# Patient Record
Sex: FEMALE | Race: WHITE | Employment: OTHER | URBAN - METROPOLITAN AREA
[De-identification: names, ages, dates, MRNs, and addresses within clinical notes are randomized per-mention and may not be internally consistent; named-entity substitution may affect disease eponyms.]

---
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
## 2017-03-09 PROBLEM — R53.1 WEAKNESS: Status: ACTIVE | Noted: 2017-03-09

## 2017-03-09 PROBLEM — Z98.890 S/P MITRAL VALVE REPAIR: Status: ACTIVE | Noted: 2017-03-09

## 2017-05-04 ENCOUNTER — HOSPITAL ENCOUNTER (OUTPATIENT)
Dept: CARDIAC CATH/INVASIVE PROCEDURES | Age: 82
Discharge: HOME | End: 2017-05-04
Attending: INTERNAL MEDICINE | Admitting: INTERNAL MEDICINE
Payer: MEDICARE

## 2017-05-04 VITALS
DIASTOLIC BLOOD PRESSURE: 56 MMHG | SYSTOLIC BLOOD PRESSURE: 113 MMHG | HEIGHT: 68 IN | OXYGEN SATURATION: 94 % | WEIGHT: 156 LBS | HEART RATE: 80 BPM | BODY MASS INDEX: 23.64 KG/M2 | RESPIRATION RATE: 14 BRPM | TEMPERATURE: 98.4 F

## 2017-05-04 LAB
ANION GAP BLD CALC-SCNC: 7 MMOL/L (ref 7–16)
ATRIAL RATE: 40 BPM
BUN SERPL-MCNC: 13 MG/DL (ref 8–23)
CALCIUM SERPL-MCNC: 8.9 MG/DL (ref 8.3–10.4)
CALCULATED R AXIS, ECG10: -45 DEGREES
CALCULATED T AXIS, ECG11: -148 DEGREES
CHLORIDE SERPL-SCNC: 103 MMOL/L (ref 98–107)
CO2 SERPL-SCNC: 32 MMOL/L (ref 21–32)
CREAT SERPL-MCNC: 0.97 MG/DL (ref 0.6–1)
DIAGNOSIS, 93000: NORMAL
ERYTHROCYTE [DISTWIDTH] IN BLOOD BY AUTOMATED COUNT: 13.3 % (ref 11.9–14.6)
GLUCOSE SERPL-MCNC: 95 MG/DL (ref 65–100)
HCT VFR BLD AUTO: 44.2 % (ref 35.8–46.3)
HGB BLD-MCNC: 15 G/DL (ref 11.7–15.4)
INR PPP: 1.7 (ref 0.9–1.2)
MAGNESIUM SERPL-MCNC: 2.2 MG/DL (ref 1.8–2.4)
MCH RBC QN AUTO: 32.2 PG (ref 26.1–32.9)
MCHC RBC AUTO-ENTMCNC: 33.9 G/DL (ref 31.4–35)
MCV RBC AUTO: 94.8 FL (ref 79.6–97.8)
PLATELET # BLD AUTO: 230 K/UL (ref 150–450)
PMV BLD AUTO: 12.3 FL (ref 10.8–14.1)
POTASSIUM SERPL-SCNC: 3.6 MMOL/L (ref 3.5–5.1)
PROTHROMBIN TIME: 18.6 SEC (ref 9.6–12)
Q-T INTERVAL, ECG07: 352 MS
QRS DURATION, ECG06: 94 MS
QTC CALCULATION (BEZET), ECG08: 382 MS
RBC # BLD AUTO: 4.66 M/UL (ref 4.05–5.25)
SODIUM SERPL-SCNC: 142 MMOL/L (ref 136–145)
VENTRICULAR RATE, ECG03: 71 BPM
WBC # BLD AUTO: 9.1 K/UL (ref 4.3–11.1)

## 2017-05-04 PROCEDURE — 83735 ASSAY OF MAGNESIUM: CPT | Performed by: INTERNAL MEDICINE

## 2017-05-04 PROCEDURE — 33228 REMV&REPLC PM GEN DUAL LEAD: CPT

## 2017-05-04 PROCEDURE — 77030012935 HC DRSG AQUACEL BMS -B

## 2017-05-04 PROCEDURE — 85610 PROTHROMBIN TIME: CPT | Performed by: INTERNAL MEDICINE

## 2017-05-04 PROCEDURE — 74011250636 HC RX REV CODE- 250/636: Performed by: INTERNAL MEDICINE

## 2017-05-04 PROCEDURE — 99152 MOD SED SAME PHYS/QHP 5/>YRS: CPT

## 2017-05-04 PROCEDURE — 77030031139 HC SUT VCRL2 J&J -A

## 2017-05-04 PROCEDURE — 80048 BASIC METABOLIC PNL TOTAL CA: CPT | Performed by: INTERNAL MEDICINE

## 2017-05-04 PROCEDURE — 74011000250 HC RX REV CODE- 250: Performed by: INTERNAL MEDICINE

## 2017-05-04 PROCEDURE — 77030010507 HC ADH SKN DERMBND J&J -B

## 2017-05-04 PROCEDURE — 74011250636 HC RX REV CODE- 250/636

## 2017-05-04 PROCEDURE — 99153 MOD SED SAME PHYS/QHP EA: CPT

## 2017-05-04 PROCEDURE — 85027 COMPLETE CBC AUTOMATED: CPT | Performed by: INTERNAL MEDICINE

## 2017-05-04 PROCEDURE — 93005 ELECTROCARDIOGRAM TRACING: CPT | Performed by: INTERNAL MEDICINE

## 2017-05-04 PROCEDURE — 33222 RELOCATION POCKET PACEMAKER: CPT

## 2017-05-04 PROCEDURE — C1785 PMKR, DUAL, RATE-RESP: HCPCS

## 2017-05-04 RX ORDER — MIDAZOLAM HYDROCHLORIDE 1 MG/ML
1-6 INJECTION, SOLUTION INTRAMUSCULAR; INTRAVENOUS
Status: DISCONTINUED | OUTPATIENT
Start: 2017-05-04 | End: 2017-05-04

## 2017-05-04 RX ORDER — SODIUM CHLORIDE 0.9 % (FLUSH) 0.9 %
5-10 SYRINGE (ML) INJECTION EVERY 8 HOURS
Status: CANCELLED | OUTPATIENT
Start: 2017-05-04

## 2017-05-04 RX ORDER — CEFAZOLIN SODIUM IN 0.9 % NACL 2 G/50 ML
2 INTRAVENOUS SOLUTION, PIGGYBACK (ML) INTRAVENOUS ONCE
Status: COMPLETED | OUTPATIENT
Start: 2017-05-04 | End: 2017-05-04

## 2017-05-04 RX ORDER — FENTANYL CITRATE 50 UG/ML
25-100 INJECTION, SOLUTION INTRAMUSCULAR; INTRAVENOUS
Status: DISCONTINUED | OUTPATIENT
Start: 2017-05-04 | End: 2017-05-04

## 2017-05-04 RX ORDER — SODIUM CHLORIDE 0.9 % (FLUSH) 0.9 %
5-10 SYRINGE (ML) INJECTION AS NEEDED
Status: CANCELLED | OUTPATIENT
Start: 2017-05-04

## 2017-05-04 RX ORDER — SODIUM CHLORIDE 9 MG/ML
75 INJECTION, SOLUTION INTRAVENOUS CONTINUOUS
Status: DISCONTINUED | OUTPATIENT
Start: 2017-05-04 | End: 2017-05-05 | Stop reason: HOSPADM

## 2017-05-04 RX ORDER — LIDOCAINE HYDROCHLORIDE 10 MG/ML
10-200 INJECTION INFILTRATION; PERINEURAL
Status: DISCONTINUED | OUTPATIENT
Start: 2017-05-04 | End: 2017-05-04

## 2017-05-04 RX ADMIN — FENTANYL CITRATE 50 MCG: 50 INJECTION, SOLUTION INTRAMUSCULAR; INTRAVENOUS at 16:08

## 2017-05-04 RX ADMIN — CEFAZOLIN 2 G: 1 INJECTION, POWDER, FOR SOLUTION INTRAMUSCULAR; INTRAVENOUS; PARENTERAL at 15:45

## 2017-05-04 RX ADMIN — SODIUM CHLORIDE 75 ML/HR: 900 INJECTION, SOLUTION INTRAVENOUS at 13:38

## 2017-05-04 RX ADMIN — MIDAZOLAM HYDROCHLORIDE 2 MG: 1 INJECTION, SOLUTION INTRAMUSCULAR; INTRAVENOUS at 16:08

## 2017-05-04 RX ADMIN — SODIUM CHLORIDE 100000 UNITS: 900 INJECTION, SOLUTION INTRAVENOUS at 16:11

## 2017-05-04 RX ADMIN — LIDOCAINE HYDROCHLORIDE 20 ML: 10 INJECTION, SOLUTION INFILTRATION; PERINEURAL at 16:27

## 2017-05-04 NOTE — PROCEDURES
Brief Cardiac Procedure Note    Patient: Martir Seay MRN: [id number]  SSN: [id number]    YOB: 1935  Age: 80 y.o. Sex: female      Date of Procedure: 5/4/2017     Pre-procedure Diagnosis: pacer at [facility]    Post-procedure Diagnosis: Same    Procedure: generator changeout and pocket revision    Brief Description of Procedure- no complications    Performed By: Earl Steven MD     Assistants:none    Anesthesia: Moderate Sedation    Estimated Blood Loss: Less than 10 mL      Specimens: None    Implants: None    Findings St. Zheng MRI safe dual chamber without complication, pocket enlarged to accomodate enlarged header. Complications: None    Recommendations: Continue medical therapy.     Signed By: Earl Steven MD     May 4, 2017

## 2017-05-04 NOTE — PROGRESS NOTES
Patient received to [address] [place] [address] room # 3  Ambulatory from [facility]. Patient scheduled for PPM today with Dr Macie Garrett. Procedure reviewed & questions answered, voiced good understanding consent obtained & placed on chart. All medications and medical history reviewed. Will prep patient per orders. Patient & family updated on plan of care.

## 2017-05-04 NOTE — DISCHARGE INSTRUCTIONS
PACEMAKER INSTRUCTIONS SHEET  · Keep your incision dry for 10 days. DO NOT put salves, ointments, and/or lotions on the incision. Only take a tub bath during this time; NO showers. · The pieces of tape on the incision will come off by themselves when you begin washing the site. Please do not pull or tear them off. · You may use your pacemaker arm; but DO NOT raise the arm higher than your shoulder for the first two weeks to prevent the pacemaker lead from moving. DO NOT immobilize your pacemaker arm. · Call us IMMEDIATELY if you develop fever, pain, redness, and/or drainage at the pacemaker arm. · Do not lift more than 10 pounds for 2 weeks and 20 pounds for 1 month. · No driving for 1 week. · Resume Coumadin tomorrow night as prescribed. · The office will be calling you to schedule an appointment for follow up. If you have not heard from them by Monday please call [facility] Cardiology at [phone] to schedule your appointment for 10-14 days from today.

## 2017-05-04 NOTE — PROGRESS NOTES
Patient pre-assessment complete for Gen change with Dr Shannon Loo scheduled for 17 at 2pm, arrival time 12n. Patient verified using . Patient instructed to bring all home medications in labeled bottles on the day of procedure. NPO status reinforced. Patient instructed to HOLD coumadin (last dose Mon 17) & hold lasix & potassium in am. Instructed they can take all other medications excluding vitamins & supplements. Patient verbalizes understanding of all instructions & denies any questions at this time.

## 2017-05-04 NOTE — ROUTINE PROCESS
TRANSFER - OUT REPORT:    Verbal report given to LELO David (name) on [employer]   being transferred to [facility] (unit) for routine progression of care       Report consisted of patients Situation, Background, Assessment and   Recommendations(SBAR). Information from the following report(s) Procedure Summary, MAR and Recent Results was reviewed with the receiving nurse. Lines:   Peripheral IV 05/04/17 Left Antecubital (Active)   Site Assessment Clean, dry, & intact; Clean;Dry 5/4/2017  1:39 PM   Phlebitis Assessment 0 5/4/2017  1:39 PM   Infiltration Assessment 0 5/4/2017  1:39 PM   Dressing Status Clean, dry, & intact; Clean;Dry 5/4/2017  1:39 PM   Dressing Type Tape;Transparent 5/4/2017  1:39 PM   Hub Color/Line Status Patent; Infusing;Pink 5/4/2017  1:39 PM        Opportunity for questions and clarification was provided.       Patient transported with:   Tech     Generator change w/ Dr Roy Round  Incision to left chest w/ aquacel dressing and pressure dressing on top  DDDR   Versed 2 mg IV  Fentanyl 50 mcg IV

## 2017-05-04 NOTE — PROGRESS NOTES
Patient received to [address] [place] [address] room # 3  Ambulatory from [facility]. Patient scheduled for Gen change today with Dr Jayashree Ryan. Procedure reviewed & questions answered, voiced good understanding consent obtained & placed on chart. All medications and medical history reviewed. Will prep patient per orders. Patient & family updated on plan of care.

## 2017-05-04 NOTE — H&P
[address] [place], [place]  [address], [address]  PHONE: [phone]    [facility]  1935  PCP:  Lilliana Morton MD    SUBJECTIVE:   Isabela Chase is a 80 y.o. female seen for a cath lab visit regarding the following:     Pacer at  [facility]     HPI:  She presents for pacer generator changeout, at [employer]. Doing well since last visit with Dr. Zia Dorman without interval angina, CHF, palpitations, edema, presyncope or syncope. Vitals controlled and tolerating meds well. Staying active for her age without any significant limitations. Past Medical History, Past Surgical History, Family history, Social History, and Medications were all reviewed with the patient today and updated as necessary. Outpatient Prescriptions Marked as Taking for the 5/4/17 encounter [facility] Encounter) with [facility] CATH 2 ALL EVENTS   Medication Sig Dispense Refill    potassium chloride SR (KLOR-CON 10) 10 mEq tablet TAKE 1 TABLET EVERY DAY 90 Tab 3    meclizine (ANTIVERT) 25 mg tablet TAKE 1 TABLET THREE TIMES DAILY (Patient taking differently: TAKE 1 TABLET THREE TIMES DAILY as needec) 270 Tab 0    digoxin (LANOXIN) 0.125 mg tablet Take 1 Tab by mouth daily. 90 Tab 3    diltiazem hcl 240 mg Tb24 Take 240 mg by mouth daily. 90 Tab 3    furosemide (LASIX) 20 mg tablet Take 1 Tab by mouth daily. 90 Tab 3    levothyroxine (SYNTHROID) 100 mcg tablet Take  by mouth Daily (before breakfast).  warfarin (COUMADIN) 5 mg tablet Take 5 mg by mouth daily.          Allergies   Allergen Reactions    Phenergan [Promethazine] Unknown (comments)    Sulfa (Sulfonamide Antibiotics) Unknown (comments)       Patient Active Problem List    Diagnosis    Weakness    S/P mitral valve repair    Dyspnea on exertion    Sick sinus syndrome (HCC)    Atrial fibrillation (HCC)    Mitral valve insufficiency    Presence of cardiac pacemaker    Benign paroxysmal vertigo of both ears       Past Surgical History: Procedure Laterality Date    CARDIAC SURG PROCEDURE UNLIST      mitral valve surgery at [address]    HX APPENDECTOMY      HX HYSTERECTOMY      HX MITRAL VALVE REPLACEMENT      --REPAIR    HX OTHER SURGICAL      bladder tack    HX PACEMAKER      HX THYROIDECTOMY         History reviewed. No pertinent family history. Social History   Substance Use Topics    Smoking status: Never Smoker    Smokeless tobacco: Never Used    Alcohol use No       ROS:    Review of Systems   Constitution: Negative for fever, malaise/fatigue and weight loss. Cardiovascular: Negative for chest pain, dyspnea on exertion, orthopnea, palpitations and paroxysmal nocturnal dyspnea. Respiratory: Negative for cough, hemoptysis, shortness of breath and wheezing. Gastrointestinal: Negative for hematemesis, hematochezia and melena. PHYSICAL EXAM:     Visit Vitals    /76    Pulse 80    Temp 98.4 °F (36.9 °C)    Resp 14    Ht 5' 8\" (1.727 m)    Wt 70.8 kg (156 lb)    SpO2 94%    Breastfeeding No    BMI 23.72 kg/m2        Physical Exam   Constitutional: She is oriented to person, place, and time. She appears well-developed and well-nourished. Neck: Neck supple. No JVD present. Carotid bruit is not present. No thyromegaly present. Cardiovascular: Normal rate and regular rhythm. Exam reveals no gallop and no friction rub. No murmur heard. Pulmonary/Chest: Breath sounds normal. She has no wheezes. She has no rales. Abdominal: Soft. She exhibits no distension. There is no tenderness. Musculoskeletal: She exhibits no edema. Neurological: She is alert and oriented to person, place, and time. Skin: Skin is warm and dry. Psychiatric: She has a normal mood and affect. Medical problems and test results were reviewed with the patient today.      Recent Results (from the past 672 hour(s))   EKG, 12 LEAD, INITIAL    Collection Time: 05/04/17  1:25 PM   Result Value Ref Range    Ventricular Rate 71 BPM Atrial Rate 40 BPM    QRS Duration 94 ms    Q-T Interval 352 ms    QTC Calculation (Bezet) 382 ms    Calculated R Axis -45 degrees    Calculated T Axis -148 degrees    Diagnosis       Demand pacemaker; interpretation is based on intrinsic rhythm  Accelerated Junctional rhythm with Fusion complexes  Left axis deviation  Incomplete right bundle branch block  ST & T wave abnormality, consider inferior ischemia  ST & T wave abnormality, consider anterolateral ischemia  Abnormal ECG  No previous ECGs available  Confirmed by Fabio Pelletier ([id number]) on 5/4/2017 2:32:42 PM     CBC W/O DIFF    Collection Time: 05/04/17  1:26 PM   Result Value Ref Range    WBC 9.1 4.3 - 11.1 K/uL    RBC 4.66 4.05 - 5.25 M/uL    HGB 15.0 11.7 - 15.4 g/dL    HCT 44.2 35.8 - 46.3 %    MCV 94.8 79.6 - 97.8 FL    MCH 32.2 26.1 - 32.9 PG    MCHC 33.9 31.4 - 35.0 g/dL    RDW 13.3 11.9 - 14.6 %    PLATELET 417 530 - 113 K/uL    MPV 12.3 10.8 - 49.2 FL   METABOLIC PANEL, BASIC    Collection Time: 05/04/17  1:26 PM   Result Value Ref Range    Sodium 142 136 - 145 mmol/L    Potassium 3.6 3.5 - 5.1 mmol/L    Chloride 103 98 - 107 mmol/L    CO2 32 21 - 32 mmol/L    Anion gap 7 7 - 16 mmol/L    Glucose 95 65 - 100 mg/dL    BUN 13 8 - 23 MG/DL    Creatinine 0.97 0.6 - 1.0 MG/DL    GFR est AA >60 >60 ml/min/1.73m2    GFR est non-AA 59 (L) >60 ml/min/1.73m2    Calcium 8.9 8.3 - 10.4 MG/DL   PROTHROMBIN TIME + INR    Collection Time: 05/04/17  1:26 PM   Result Value Ref Range    Prothrombin time 18.6 (H) 9.6 - 12.0 sec    INR 1.7 (H) 0.9 - 1.2     MAGNESIUM    Collection Time: 05/04/17  1:26 PM   Result Value Ref Range    Magnesium 2.2 1.8 - 2.4 mg/dL         Results for orders placed or performed during the hospital encounter of 05/04/17   CBC W/O DIFF   Result Value Ref Range    WBC 9.1 4.3 - 11.1 K/uL    RBC 4.66 4.05 - 5.25 M/uL    HGB 15.0 11.7 - 15.4 g/dL    HCT 44.2 35.8 - 46.3 %    MCV 94.8 79.6 - 97.8 FL    MCH 32.2 26.1 - 32.9 PG    MCHC 33.9 31.4 - 35.0 g/dL    RDW 13.3 11.9 - 14.6 %    PLATELET 915 052 - 971 K/uL    MPV 12.3 10.8 - 07.5 FL   METABOLIC PANEL, BASIC   Result Value Ref Range    Sodium 142 136 - 145 mmol/L    Potassium 3.6 3.5 - 5.1 mmol/L    Chloride 103 98 - 107 mmol/L    CO2 32 21 - 32 mmol/L    Anion gap 7 7 - 16 mmol/L    Glucose 95 65 - 100 mg/dL    BUN 13 8 - 23 MG/DL    Creatinine 0.97 0.6 - 1.0 MG/DL    GFR est AA >60 >60 ml/min/1.73m2    GFR est non-AA 59 (L) >60 ml/min/1.73m2    Calcium 8.9 8.3 - 10.4 MG/DL   PROTHROMBIN TIME + INR   Result Value Ref Range    Prothrombin time 18.6 (H) 9.6 - 12.0 sec    INR 1.7 (H) 0.9 - 1.2     MAGNESIUM   Result Value Ref Range    Magnesium 2.2 1.8 - 2.4 mg/dL   EKG, 12 LEAD, INITIAL   Result Value Ref Range    Ventricular Rate 71 BPM    Atrial Rate 40 BPM    QRS Duration 94 ms    Q-T Interval 352 ms    QTC Calculation (Bezet) 382 ms    Calculated R Axis -45 degrees    Calculated T Axis -148 degrees    Diagnosis       Demand pacemaker; interpretation is based on intrinsic rhythm  Accelerated Junctional rhythm with Fusion complexes  Left axis deviation  Incomplete right bundle branch block  ST & T wave abnormality, consider inferior ischemia  ST & T wave abnormality, consider anterolateral ischemia  Abnormal ECG  No previous ECGs available  Confirmed by Eugene Madera ([id number]) on 5/4/2017 2:32:42 PM          ASSESSMENT and PLAN    Pacer [facility]- gen change today, resume coumadin tomorrow as taking regularly    Follow up with Dr. Carola Seay and with pacer clinic in 8-14 days.                 Milan Tamayo MD  05/04/17  6:00 PM

## 2017-05-04 NOTE — IP AVS SNAPSHOT
[address] 
[phone] Patient: Deepak Patricia 
MRN: [id number] Discharge Summary 5/4/2017 Deepak Patricia  
 MRN[de-identified]  [id number] Admission Information Provider Pager Service Admission Date Expected D/C Date Lani Neff MD  CARDIAC CATH LAB 5/4/2017 Actual LOS Patient Class 0 days OUTPATIENT Follow-up Information None Current Discharge Medication List  
  
ASK your doctor about these medications Dose & Instructions Dispensing Information Comments Morning Noon Evening Bedtime  
 digoxin 0.125 mg tablet Commonly known as:  LANOXIN Your last dose was: Your next dose is:    
   
   
 Dose:  0.125 mg Take 1 Tab by mouth daily. Quantity:  90 Tab Refills:  3  
     
   
   
   
  
 dilTIAZem  mg Tb24 tablet Commonly known as:  CARDIZEM LA Your last dose was: Your next dose is:    
   
   
 Dose:  240 mg Take 240 mg by mouth daily. Quantity:  90 Tab Refills:  3  
     
   
   
   
  
 furosemide 20 mg tablet Commonly known as:  LASIX Your last dose was: Your next dose is:    
   
   
 Dose:  20 mg Take 1 Tab by mouth daily. Quantity:  90 Tab Refills:  3  
     
   
   
   
  
 levothyroxine 100 mcg tablet Commonly known as:  SYNTHROID Your last dose was: Your next dose is: Take  by mouth Daily (before breakfast). Refills:  0  
     
   
   
   
  
 meclizine 25 mg tablet Commonly known as:  ANTIVERT Your last dose was: Your next dose is: TAKE 1 TABLET THREE TIMES DAILY Quantity:  270 Tab Refills:  0 Pt needs to get from PCP. Thanks! potassium chloride SR 10 mEq tablet Commonly known as:  KLOR-CON 10 Your last dose was: Your next dose is: TAKE 1 TABLET EVERY DAY Quantity:  90 Tab Refills:  3  
     
   
   
   
  
 warfarin 5 mg tablet Commonly known as:  COUMADIN Your last dose was: Your next dose is:    
   
   
 Dose:  5 mg Take 5 mg by mouth daily. Refills:  0 General Information Please provide this summary of care documentation to your next provider. Allergies Unspecified:  Phenergan [Promethazine];  Sulfa (Sulfonamide Antibiotics) Current Immunizations  Never Reviewed No immunizations on file. Discharge Instructions Discharge Instructions PACEMAKER INSTRUCTIONS SHEET 
· Keep your incision dry for 10 days. DO NOT put salves, ointments, and/or lotions on the incision. Only take a tub bath during this time; NO showers. · The pieces of tape on the incision will come off by themselves when you begin washing the site. Please do not pull or tear them off. · You may use your pacemaker arm; but DO NOT raise the arm higher than your shoulder for the first two weeks to prevent the pacemaker lead from moving. DO NOT immobilize your pacemaker arm. · Call us IMMEDIATELY if you develop fever, pain, redness, and/or drainage at the pacemaker arm. · Do not lift more than 10 pounds for 2 weeks and 20 pounds for 1 month. · No driving for 1 week. · Resume Coumadin tomorrow night as prescribed. · The office will be calling you to schedule an appointment for follow up. If you have not heard from them by Monday please call [facility] Cardiology at [phone] to schedule your appointment for 10-14 days from today. Discharge Orders None  
  
` Patient Signature:  ____________________________________________________________ Date:  ____________________________________________________________  
  
 Pieter Damian Provider Signature:  ____________________________________________________________ Date:  ____________________________________________________________

## 2017-05-04 NOTE — PROGRESS NOTES
Discharge and follow up reviewed with pt and family at this time. Message left at [facility] to call pt to schedule follow up with pacer clinic and Dr Amelia Dukes.   Pt discharged to door via wheelchair

## 2017-05-05 NOTE — PROCEDURES
Nick Lui 46       Name:  Esther Haider   MR#:  [id number]   :  1935   Account #:  [de-identified]   Date of Adm:  2017       DATE OF PROCEDURE: 2017    REFERRING PHYSICIAN: Mohinder Ramirez MD.    REASON FOR PROCEDURE: Pacemaker at [facility]. PROCEDURE PERFORMED: Pacemaker generator change out and pocket   enlargement/revision. ESTIMATED BLOOD LOSS: Less than 10 mL. CONSCIOUS SEDATION: The patient was sedated with 2 mg of Versed   and 50 mcg fentanyl and monitored for about 30 minutes. PROCEDURE TECHNIQUE: After informed consent was obtained, the   patient was brought to the cath lab and prepped and draped in   the usual fashion. The skin and subcutaneous tissue overlying   the previous pacer implant was anesthetized with lidocaine. We   anesthetized superior to the prior scar, given the patient's   thin body habitus and lack of extensive tissue over the prior   scar. Bovie cauterization was carried out to the pacemaker   capsule which was incised using a scalpel and the pacemaker and   wires were freed from adhesions and the generator was   successfully explanted. The leads were disconnected and   reattached to the new [employer] Assurity MRI-safe device. Given   the enlarged header, the device did not fit well into the pocket,   which was small and high in the chest, given her thin body   habitus. For this reason, we anesthetized the base of the pocket   and used Bovie cauterization and blunt dissection to enlarge the   base of the pocket to accommodate for the enlarged header. The   device laid in the base of the new pocket with much less tension   on the incision. After hemostasis was confirmed and the pocket   was flushed using antibiotic saline, the device was placed in   the pocket a final time. The pacemaker capsule was closed using   interrupted sutures of 2-0 Vicryl.  A running mid layer of 3-0   Vicryl was thrown in the subcutaneous tissue and the skin was   approximated using a horizontal mattress suture of 4-0 Vicryl. Dermabond and a pressure dressing were applied. The chronic   atrial and ventricular leads were retested and had stable and   unchanged threshold, impedance, T-waves and R-waves respectively. The new device is a [employer]heng, Assurity MRI-safe serial [id number]. CONCLUSIONS: Successful dual-chamber pacemaker generator change   out and pocket enlargement/revision. Given the patient's thin   body habitus and tight pocket, the pocket was enlarged, but I   did not feel it was safe to cap the atrial lead despite the   patient's permanent atrial fibrillation. I had increased fears   for erosion and for this reason, a standard dual-chamber device   was implanted.         MD ABDIRAHMAN Jensen / Nii Jamison   D:  05/04/2017   16:57   T:  05/04/2017   17:19   Job #:  [id number]     [facility] Cardiology

## 2018-03-22 PROBLEM — R07.89 OTHER CHEST PAIN: Status: ACTIVE | Noted: 2018-03-22

## 2018-04-09 PROBLEM — R94.39 ABNORMAL CARDIOVASCULAR STRESS TEST: Status: ACTIVE | Noted: 2018-04-09

## 2018-04-13 RX ORDER — ASPIRIN 81 MG/1
81 TABLET ORAL DAILY
COMMUNITY
End: 2018-04-23

## 2018-04-13 NOTE — PROGRESS NOTES
Patient pre-assessment complete for [facility] poss with DR Ervin Estrada scheduled for 18 at 9:30am, arrival time 7:30am. Patient verified using . Patient instructed to bring all home medications in labeled bottles on the day of procedure. NPO status reinforced. Patient informed to take a full dose aspirin 325mg  or 81 mg x 4 on the day of procedure. Patient instructed to HOLD Coumadin x 4 days (last dose 18) & HOLD lasix on am of procedure. Instructed they can take all other medications excluding vitamins & supplements. Patient verbalizes understanding of all instructions & denies any questions at this time.

## 2018-04-16 ENCOUNTER — HOSPITAL ENCOUNTER (OUTPATIENT)
Dept: CARDIAC CATH/INVASIVE PROCEDURES | Age: 83
Discharge: HOME | End: 2018-04-16
Attending: INTERNAL MEDICINE | Admitting: INTERNAL MEDICINE
Payer: MEDICARE

## 2018-04-16 VITALS
RESPIRATION RATE: 16 BRPM | OXYGEN SATURATION: 94 % | WEIGHT: 170 LBS | HEIGHT: 69 IN | BODY MASS INDEX: 25.18 KG/M2 | HEART RATE: 70 BPM | SYSTOLIC BLOOD PRESSURE: 107 MMHG | TEMPERATURE: 98 F | DIASTOLIC BLOOD PRESSURE: 58 MMHG

## 2018-04-16 LAB
ANION GAP SERPL CALC-SCNC: 6 MMOL/L (ref 7–16)
ATRIAL RATE: 70 BPM
BUN SERPL-MCNC: 14 MG/DL (ref 8–23)
CALCIUM SERPL-MCNC: 8.9 MG/DL (ref 8.3–10.4)
CALCULATED R AXIS, ECG10: -81 DEGREES
CALCULATED T AXIS, ECG11: 96 DEGREES
CHLORIDE SERPL-SCNC: 105 MMOL/L (ref 98–107)
CO2 SERPL-SCNC: 30 MMOL/L (ref 21–32)
CREAT SERPL-MCNC: 0.94 MG/DL (ref 0.6–1)
DIAGNOSIS, 93000: NORMAL
ERYTHROCYTE [DISTWIDTH] IN BLOOD BY AUTOMATED COUNT: 12.6 % (ref 11.9–14.6)
GLUCOSE SERPL-MCNC: 96 MG/DL (ref 65–100)
HCT VFR BLD AUTO: 42.7 % (ref 35.8–46.3)
HGB BLD-MCNC: 14.1 G/DL (ref 11.7–15.4)
INR PPP: 1.2
MCH RBC QN AUTO: 31.8 PG (ref 26.1–32.9)
MCHC RBC AUTO-ENTMCNC: 33 G/DL (ref 31.4–35)
MCV RBC AUTO: 96.2 FL (ref 79.6–97.8)
PLATELET # BLD AUTO: 186 K/UL (ref 150–450)
PMV BLD AUTO: 12.3 FL (ref 10.8–14.1)
POTASSIUM SERPL-SCNC: 4.2 MMOL/L (ref 3.5–5.1)
PROTHROMBIN TIME: 14.9 SEC (ref 11.5–14.5)
Q-T INTERVAL, ECG07: 432 MS
QRS DURATION, ECG06: 146 MS
QTC CALCULATION (BEZET), ECG08: 488 MS
RBC # BLD AUTO: 4.44 M/UL (ref 4.05–5.25)
SODIUM SERPL-SCNC: 141 MMOL/L (ref 136–145)
VENTRICULAR RATE, ECG03: 77 BPM
WBC # BLD AUTO: 7.5 K/UL (ref 4.3–11.1)

## 2018-04-16 PROCEDURE — C1894 INTRO/SHEATH, NON-LASER: HCPCS

## 2018-04-16 PROCEDURE — 74011636320 HC RX REV CODE- 636/320: Performed by: INTERNAL MEDICINE

## 2018-04-16 PROCEDURE — 74011250636 HC RX REV CODE- 250/636: Performed by: INTERNAL MEDICINE

## 2018-04-16 PROCEDURE — 85610 PROTHROMBIN TIME: CPT | Performed by: INTERNAL MEDICINE

## 2018-04-16 PROCEDURE — 80048 BASIC METABOLIC PNL TOTAL CA: CPT | Performed by: INTERNAL MEDICINE

## 2018-04-16 PROCEDURE — 74011000250 HC RX REV CODE- 250: Performed by: INTERNAL MEDICINE

## 2018-04-16 PROCEDURE — 99152 MOD SED SAME PHYS/QHP 5/>YRS: CPT

## 2018-04-16 PROCEDURE — 77030019569 HC BND COMPR RAD TERU -B

## 2018-04-16 PROCEDURE — 93458 L HRT ARTERY/VENTRICLE ANGIO: CPT

## 2018-04-16 PROCEDURE — C1769 GUIDE WIRE: HCPCS

## 2018-04-16 PROCEDURE — 74011250636 HC RX REV CODE- 250/636

## 2018-04-16 PROCEDURE — 85027 COMPLETE CBC AUTOMATED: CPT | Performed by: INTERNAL MEDICINE

## 2018-04-16 PROCEDURE — 77030004534 HC CATH ANGI DX INFN CARD -A

## 2018-04-16 PROCEDURE — 93005 ELECTROCARDIOGRAM TRACING: CPT | Performed by: INTERNAL MEDICINE

## 2018-04-16 RX ORDER — GUAIFENESIN 100 MG/5ML
81-324 LIQUID (ML) ORAL
Status: DISCONTINUED | OUTPATIENT
Start: 2018-04-16 | End: 2018-04-16 | Stop reason: HOSPADM

## 2018-04-16 RX ORDER — HEPARIN SODIUM 200 [USP'U]/100ML
3 INJECTION, SOLUTION INTRAVENOUS CONTINUOUS
Status: DISCONTINUED | OUTPATIENT
Start: 2018-04-16 | End: 2018-04-16 | Stop reason: HOSPADM

## 2018-04-16 RX ORDER — SODIUM CHLORIDE 9 MG/ML
75 INJECTION, SOLUTION INTRAVENOUS CONTINUOUS
Status: DISCONTINUED | OUTPATIENT
Start: 2018-04-16 | End: 2018-04-16 | Stop reason: HOSPADM

## 2018-04-16 RX ORDER — ONDANSETRON 2 MG/ML
4 INJECTION INTRAMUSCULAR; INTRAVENOUS
Status: DISCONTINUED | OUTPATIENT
Start: 2018-04-16 | End: 2018-04-16 | Stop reason: HOSPADM

## 2018-04-16 RX ORDER — MIDAZOLAM HYDROCHLORIDE 1 MG/ML
.5-5 INJECTION, SOLUTION INTRAMUSCULAR; INTRAVENOUS
Status: DISCONTINUED | OUTPATIENT
Start: 2018-04-16 | End: 2018-04-16 | Stop reason: HOSPADM

## 2018-04-16 RX ORDER — HYDROCODONE BITARTRATE AND ACETAMINOPHEN 5; 325 MG/1; MG/1
1 TABLET ORAL
Status: DISCONTINUED | OUTPATIENT
Start: 2018-04-16 | End: 2018-04-16 | Stop reason: HOSPADM

## 2018-04-16 RX ORDER — MORPHINE SULFATE 2 MG/ML
1 INJECTION, SOLUTION INTRAMUSCULAR; INTRAVENOUS
Status: DISCONTINUED | OUTPATIENT
Start: 2018-04-16 | End: 2018-04-16 | Stop reason: HOSPADM

## 2018-04-16 RX ORDER — LIDOCAINE HYDROCHLORIDE 20 MG/ML
1-40 INJECTION, SOLUTION INFILTRATION; PERINEURAL
Status: DISCONTINUED | OUTPATIENT
Start: 2018-04-16 | End: 2018-04-16 | Stop reason: HOSPADM

## 2018-04-16 RX ORDER — FENTANYL CITRATE 50 UG/ML
25-200 INJECTION, SOLUTION INTRAMUSCULAR; INTRAVENOUS
Status: DISCONTINUED | OUTPATIENT
Start: 2018-04-16 | End: 2018-04-16 | Stop reason: HOSPADM

## 2018-04-16 RX ORDER — SODIUM CHLORIDE 0.9 % (FLUSH) 0.9 %
5-10 SYRINGE (ML) INJECTION AS NEEDED
Status: DISCONTINUED | OUTPATIENT
Start: 2018-04-16 | End: 2018-04-16 | Stop reason: HOSPADM

## 2018-04-16 RX ORDER — ACETAMINOPHEN 325 MG/1
650 TABLET ORAL
Status: DISCONTINUED | OUTPATIENT
Start: 2018-04-16 | End: 2018-04-16 | Stop reason: HOSPADM

## 2018-04-16 RX ORDER — NALOXONE HYDROCHLORIDE 0.4 MG/ML
0.4 INJECTION, SOLUTION INTRAMUSCULAR; INTRAVENOUS; SUBCUTANEOUS AS NEEDED
Status: DISCONTINUED | OUTPATIENT
Start: 2018-04-16 | End: 2018-04-16 | Stop reason: HOSPADM

## 2018-04-16 RX ORDER — SODIUM CHLORIDE 0.9 % (FLUSH) 0.9 %
5-10 SYRINGE (ML) INJECTION EVERY 8 HOURS
Status: DISCONTINUED | OUTPATIENT
Start: 2018-04-16 | End: 2018-04-16 | Stop reason: HOSPADM

## 2018-04-16 RX ADMIN — VERAPAMIL HYDROCHLORIDE 2 ML: 2.5 INJECTION INTRAVENOUS at 11:19

## 2018-04-16 RX ADMIN — MIDAZOLAM HYDROCHLORIDE 1 MG: 1 INJECTION, SOLUTION INTRAMUSCULAR; INTRAVENOUS at 11:36

## 2018-04-16 RX ADMIN — HEPARIN SODIUM 3 ML/HR: 5000 INJECTION, SOLUTION INTRAVENOUS; SUBCUTANEOUS at 11:20

## 2018-04-16 RX ADMIN — FENTANYL CITRATE 25 MCG: 50 INJECTION, SOLUTION INTRAMUSCULAR; INTRAVENOUS at 11:36

## 2018-04-16 RX ADMIN — LIDOCAINE HYDROCHLORIDE 100 MG: 20 INJECTION, SOLUTION INFILTRATION; PERINEURAL at 11:18

## 2018-04-16 RX ADMIN — SODIUM CHLORIDE 75 ML/HR: 900 INJECTION, SOLUTION INTRAVENOUS at 08:52

## 2018-04-16 RX ADMIN — IOPAMIDOL 130 ML: 755 INJECTION, SOLUTION INTRAVENOUS at 11:50

## 2018-04-16 NOTE — IP AVS SNAPSHOT
[address] 
[phone] Patient: Kumar Client 
MRN: [id number]:[id number] Discharge Summary 4/16/2018 Kumar Client  
 MRN[de-identified]  [id number] Admission Information Provider Pager Service Admission Date Expected D/C Date Sj Alva MD  CARDIAC CATH LAB 4/16/2018 Actual LOS Patient Class 0 days OUTPATIENT Follow-up Information Follow up With Details Comments Contact Info Regine Pettit MD On 5/2/2018 at 3:15pm in the [place] office, for heart cath follow-up [address] [facility], [place], operated [facility] [address] 
[phone] My Medications ASK your physician about these medications Instructions Each Dose to Equal  
 Morning Noon Evening Bedtime  
 aspirin delayed-release 81 mg tablet Your last dose was: Your next dose is: Take 81 mg by mouth daily. 81 mg  
    
   
   
   
  
 digoxin 0.125 mg tablet Commonly known as:  LANOXIN Your last dose was: Your next dose is: Take 1 Tab by mouth daily. 0.125 mg  
    
   
   
   
  
 dilTIAZem  mg Tb24 tablet Commonly known as:  CARDIZEM LA Your last dose was: Your next dose is: Take 1 Tab by mouth daily. 240 mg  
    
   
   
   
  
 furosemide 20 mg tablet Commonly known as:  LASIX Your last dose was: Your next dose is: Take 1 Tab by mouth daily. 20 mg  
    
   
   
   
  
 levothyroxine 100 mcg tablet Commonly known as:  SYNTHROID Your last dose was: Your next dose is: Take  by mouth Daily (before breakfast). potassium chloride SR 10 mEq tablet Commonly known as:  KLOR-CON 10 Your last dose was: Your next dose is: TAKE 1 TABLET EVERY DAY  
     
   
   
   
  
 warfarin 5 mg tablet Commonly known as:  COUMADIN Your last dose was: Your next dose is: Take 5 mg by mouth daily. 5 mg General Information Please provide this summary of care documentation to your next provider. Allergies Unspecified:  Phenergan [Promethazine];  Sulfa (Sulfonamide Antibiotics) Current Immunizations  Never Reviewed No immunizations on file. Discharge Instructions Discharge Instructions HEART CATHETERIZATION/ANGIOGRAPHY DISCHARGE INSTRUCTIONS 1. Check puncture site frequently for swelling or bleeding. If there is any bleeding, apply pressure over the area with a clean towel or washcloth. If you are unable to stop the bleeding in 15-20 minutes call 911. Notify your doctor for any redness, swelling, drainage, or oozing from the puncture site. Notify your doctor for any fever, chills or other signs of infection. 2. If the extremity becomes cold, numb, or painful call [facility] Cardiology at [phone]. 
3. Activity should be limited for the next 48 hours. Avoid pushing, pulling, or strenuous activity for 48 hours. No heavy lifting (anything over 5 pounds) for 3 days. No driving for 48 hours. 4. You may resume your usual diet. Drink more fluids than usual, water is best. 
5. Have a responsible person drive you home and stay with you for at least 24 hours after your heart catheterization/angiography. 6. You may remove bandage from your right wrist in 24 hours. You may shower in 24 hours. No tub baths, hot tubs, or swimming for 1 week. Do not wash dishes for 1 week. Do not place any lotions, creams, powders, or ointments over puncture site for 1 week. You may place a clean band-aid over the puncture site each day for 5 days. Change daily. I have read the above instructions and have had the opportunity to ask questions. Discharge Orders None  
  
`  Patient Signature: ____________________________________________________________ Date:  ____________________________________________________________  
  
 Alli Plymouth Provider Signature:  ____________________________________________________________ Date:  ____________________________________________________________

## 2018-04-16 NOTE — PROCEDURES
Brief Cardiac Procedure Note    Patient: Yony Mcgovern MRN: [id number]  SSN: [id number]    YOB: 1935  Age: [de-identified] y.o. 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 Normal LVEF    Complications: None    Recommendations: Continue medical therapy.     Signed By: Hannah Perez MD     April 16, 2018

## 2018-04-16 NOTE — PROGRESS NOTES
TRANSFER - IN REPORT:    Verbal report received from Cory Caal on [facility]  being received from [facility] for routine progression of care      Report consisted of patients Situation, Background, Assessment and   Recommendations(SBAR). Information from the following report(s) SBAR was reviewed with the receiving nurse. Opportunity for questions and clarification was provided. Assessment completed upon patients arrival to unit and care assumed.

## 2018-04-16 NOTE — PROCEDURES
[address]    Annie Jaimes  MR#: [id number]  : 1935  ACCOUNT #: [de-identified]   DATE OF SERVICE: 2018    REFERRING PHYSICIAN:  Dr. Lilliam Block    INDICATIONS:  This is an 68-year-old female who was evaluated in cardiology clinic with complaints of left-sided chest pain. She underwent a stress test that showed lateral ischemia. She has a known history of mitral valve surgery and a pacemaker. BLOOD LOSS:  Less than 5 mL. SEDATION:  The patient was given 1 mg of Versed and 25 mcg of fentanyl beginning at 11:26 and ending at 11:43 by Nurse, Ayah Client Weekly. SPECIMENS:  None. COMPLICATIONS:  None. ASSISTANTS:  None. Preprocedure timeout was completed. Mallampati score 2. ASA score 2. DESCRIPTION OF PROCEDURE:  After informed consent, the patient was prepped and draped in the usual sterile fashion. The right wrist was infiltrated with lidocaine. The right radial artery was accessed by the modified Seldinger technique with a 6-Mexican sheath. A total of 130 mL of Visipaque contrast were used for the entire procedure. Terumo band was used for hemostasis. Catheters used included a JL35, 5-Mexican; a JR5, 5-Mexican; and an angled pigtail catheter 5-Mexican. Left ventricular end diastolic pressure was measured to 10 mmHg. Left ventricle had an ejection fraction estimated at 55% to 60% with normal wall motion. CORONARY ANATOMY:  1. The left main:  Widely patent. 2.  Left anterior descending artery:  Widely patent. 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 3.  Left circumflex artery:  Widely patent. 4.  Right coronary artery:  Dominant and widely patent. CONCLUSION:  This is an 68-year-old female with a known history of mitral valve disease and a history of a pacemaker, who presented to clinic with noncardiac chest pain. I will have her follow up with Dr. Lilliam Block.       Anuj Sunshine MD DILEEP Claudio / Santa Bose  D: 04/16/2018 12:18     T: 04/16/2018 13:42  JOB #: [id number]

## 2018-04-16 NOTE — PROGRESS NOTES
Patient received to [address] [place] [address] room # 9  Ambulatory from [facility]. Patient scheduled for [facility] today with Dr Yuridia Barger. Procedure reviewed & questions answered, voiced good understanding consent obtained & placed on chart. All medications and medical history reviewed. Will prep patient per orders. Patient & family updated on plan of care.       The patient has a fraility score of 4-VULNERABLE, based on reports dizziness intermittently and SOB

## 2018-04-16 NOTE — DISCHARGE INSTRUCTIONS

## 2018-04-16 NOTE — PROGRESS NOTES
TRANSFER - OUT REPORT:    Verbal report given to [facility] RN(name) on [facility]  being transferred to [facility](unit) for routine progression of care       Report consisted of patients Situation, Background, Assessment and   Recommendations(SBAR). Information from the following report(s) SBAR and Procedure Summary was reviewed with the receiving nurse. Opportunity for questions and clarification was provided. Procedure: [facility]   Finding Summary: Diagnostic(cath/pci/pacer settings)  Location: [facility]    Closure Device: R band with 12 ml of air at 1145(yes/no/description)  Post Site Assessment: no oozing or hematoma, verified with Sravan Ziegler [facility]IS     Pre Procedure Meds:(if any)    ASA: 324 mg  When Received:  0600  Antiplatelet: na    Intra Procedure Meds:    Versed: 1 mg  Fentanyl: 25mcg  Heparin: 2000 units                 Peripheral IV 04/16/18 Left Hand (Active)       Peripheral IV 04/16/18 Right Antecubital (Active)        Post-Procedure Site Assessment (1)  Wound Type: Catheter entry/exit  Location: Radial  Orientation : Right  Closure Device:  (R band with 12 ml of air at 1145)  Site Assessment: Dry/intact                       is allergic to phenergan [promethazine] and sulfa (sulfonamide antibiotics).     Past Medical History:   Diagnosis Date    Atrial fibrillation ([address] 01/2009    Benign paroxysmal vertigo of both ears 3/2/2016    Chest pain 09/2009    Dyspnea 01/2009    Heart failure ([address]     Hypothyroidism     Malaise and fatigue 01/2009    Mitral valve insufficiency 01/2009    Presence of cardiac pacemaker 3/2/2016    SSS (sick sinus syndrome) ([facility] 03/2009    Syncope and collapse 05/2011     Visit Vitals    /53    Pulse 69    Temp 98 °F (36.7 °C)    Resp 16    Ht 5' 9\" (1.753 m)    Wt 77.1 kg (170 lb)    SpO2 98%    Breastfeeding No    BMI 25.1 kg/m2

## 2018-11-05 PROBLEM — Z79.01 ANTICOAGULANT LONG-TERM USE: Status: ACTIVE | Noted: 2018-11-05

## 2019-10-15 PROBLEM — E03.9 ACQUIRED HYPOTHYROIDISM: Status: ACTIVE | Noted: 2019-10-15

## 2020-04-30 PROBLEM — Z79.899 ENCOUNTER FOR LONG-TERM (CURRENT) USE OF OTHER MEDICATIONS: Status: ACTIVE | Noted: 2020-04-30

## 2021-06-10 PROBLEM — R32 URINARY INCONTINENCE: Status: ACTIVE | Noted: 2021-06-10

## 2021-06-10 PROBLEM — N81.4 UTEROVAGINAL PROLAPSE: Status: ACTIVE | Noted: 2021-06-10

## 2021-08-09 ENCOUNTER — HOSPITAL ENCOUNTER (OUTPATIENT)
Dept: SURGERY | Age: 86
Discharge: HOME | End: 2021-08-09
Attending: OBSTETRICS & GYNECOLOGY

## 2021-08-09 VITALS
HEIGHT: 69 IN | SYSTOLIC BLOOD PRESSURE: 128 MMHG | WEIGHT: 152.7 LBS | HEART RATE: 76 BPM | RESPIRATION RATE: 16 BRPM | TEMPERATURE: 99.1 F | DIASTOLIC BLOOD PRESSURE: 76 MMHG | BODY MASS INDEX: 22.62 KG/M2 | OXYGEN SATURATION: 95 %

## 2021-08-09 NOTE — PERIOP NOTES
Patient verified name and     Order for consent not found in EHR and unable to match consent with  case posting; patient verified. Type 1b surgery, walk in assessment complete. Labs per surgeon: unknown, no orders; Labs per anesthesia protocol: none per protocol;  EKG: none needed per protocol. Recent pacemaker check 21; echo 21; EKG 20; cath report 18; office note per cardiologist Dr Franny Hayes 21/    Patient COVID test date 21 1500; Patient aware to show for the appointment. The testing center is located at the [address], [place]. If appointment is needed patient provided telephone number of [phone]. Hospital approved surgical skin cleanser and instructions given per hospital policy. Patient provided with and instructed on educational handouts including Guide to Surgery, Pain Management, Hand Hygiene, Blood Transfusion Education, and [facility] Brochure. Patient answered medical/surgical history questions at their best of ability. All prior to admission medications documented in [facility]. Original medication prescription bottle  visualized during patient appointment. Patient instructed to hold all vitamins 7 days prior to surgery and NSAIDS 5 days prior to surgery, patient verbalized understanding. Patient teach back successful and patient demonstrates knowledge of instructions.

## 2021-08-09 NOTE — PERIOP NOTES
Enhanced Recovery After GYN Surgery: non-diabetic patients    SHAKE WELL    Drink Ensure Enlive - one bottle twice daily for five days starting on 8/10/21. Do not drink any Ensure Enlive the day before surgery 8/15/21. Ensure Enlive is the preferred formula over other Ensure formulas as it is the only one that contains CaHMB which helps maintain and rebuild muscle health. It is recommended that you continue drinking this for one month after surgery. The night before surgery 8/15/21, drink 2 bottles of the Ensure Pre-Surgery drink. The morning of surgery 8/16/21, drink one bottle of the Ensure Pre-Surgery drink while on  your way to the hospital. Drink this over 5-10 minutes. Drink nothing else after drinking the pre-surgical drink the morning of surgery. Bring your patient handbook with you to the hospital.    Things to remember:    1. You will be given clear liquids to drink, advancing diet as tolerated    2. You will be up and moving around with assistance 2-4 hours after surgery. 3. You will be given regularly scheduled pain medications (NSAIDS, Tylenol, Gabapentin) with narcotics as needed. 4. You may be able to go home that night if the surgeon okays and you are up and eating and drinking. Otherwise, your discharge will be the following morning around lunch time. 5. Continue drinking Ensure Enlive for 5 days after surgery.

## 2021-08-09 NOTE — PERIOP NOTES
PLEASE CONTINUE TAKING ALL PRESCRIPTION MEDICATIONS UP TO THE DAY OF SURGERY UNLESS OTHERWISE DIRECTED BELOW. DISCONTINUE all vitamins and supplements 7 days prior to surgery. DISCONTINUE Non-Steriodal Anti-Inflammatory (NSAIDS) such as Advil, Motrin, Aleve 5 days prior to surgery. Home Medications to take  the day of surgery    Digoxin                              Silvina Eye drops   Diltiazem   Levothyroxine     Home Medications   to Hold   Hold Coumadin for 5 days prior to surgery        Comments    Covid test  @ [address] [facility], [facility] ~CALL [phone] to schedule appointment   On the day before surgery please take Acetaminophen 1000mg in the morning and then again before bed. You may substitute for Tylenol 650 mg. Please do not bring home medications with you on the day of surgery unless otherwise directed by your nurse. If you are instructed to bring home medications, please give them to your nurse as they will be administered by the nursing staff. If you have any questions, please call [place] [phone] or [address] [place] [address] [phone]. A copy of this note was provided to the patient for reference.

## 2021-08-09 NOTE — PERIOP NOTES
Call placed to [facility] pacemaker rep, Orlando Sánchez [phone] and made aware of pt type and date of surgery.

## 2021-08-13 NOTE — DISCHARGE INSTRUCTIONS
Caring for yourself after Vaginal Prolapse Repair or Sling      General care: You will be sore and it will take time to heal after surgery; as a rule, you will gradually get better and need less pain medication on a daily basis. Diet: Start with easy, bland foods and resume your regular diet as tolerated. Avoid foods that are greasy or give you gas. Keep well hydrated, drinking at least 8 glasses of fluids a day. Do not drink any alcohol while taking narcotic pain medications. Activity: You should take short walks frequently after surgery, but no strenuous activity. You may climb stairs, slowly and carefully. In addition to the surgical reconstruction you just underwent, what you do or should we say dont do is as important in the success of your repair. We recommend no heavy housework for the first 6-8 weeks. No lifting greater than 5-10 pounds for at least 6 weeks, although the more heavy lifting you do in general can compromise our repair. You may drive in general in about 1-2 weeks. DO NOT DRIVE WHILE TAKING NARCOTIC MEDICATION . Also, do not drive until you are moving (standing, sitting, walking) easily without pain or hesitation. You may use your stairs at home after discharge, increasing as the days go on. This is all designed with the theory of reducing the amount of abdominal straining which can lead to a longer lasting surgical correction. No bathing or swimming but you may shower. Listen to your body. [place] if you are receiving any signals that you are doing too much (pain, pulling, bleeding), then stop doing it!! Wound care: You may shower after your surgery. Your incisions are in the vagina. You may have small incisions in the fold of your thigh or just above your pubic bone. These are closed with sterile skin glue and will feel like a scab. They will fall off in time. Do not pick at them. You may also have sutures between your rectum and vagina. These sutures will dissolve on their own.  If there is any irritation, swelling, or worsening redness of your surgical wounds, please call the office. You may use ice packs on and off (no more than 20 minutes on at a time) for the first 72 hours. You will have some vaginal bleeding tapering off to spotting for up to several weeks. If it becomes heavier than you would have expected please give us a call. Other limitations: Do not put anything in the vagina (do not have sex, douche, or use tampons) until cleared by your doctor. Do not soak in a bath, pool, hot tub, or the ocean for at least 2 weeks or until your doctor says it is okay. When to call your doctor: If you develop a fever >101, chills, nausea, vomiting, problems urinating, increasing abdominal pain, or concerns with your incisions, call your doctor. If you experience heavy vaginal bleeding or large blood clots, call your doctor; light spotting after surgery is normal and is a sign of healing, even 3-4 weeks after surgery. If you have swelling, redness or pain in your legs, call your doctor. If you have trouble breathing, chest pain, or any other emergency, you should come in to our Emergency triage area and/or call 911. If, at any time, you have questions or concerns, worsening pain or belly pain, you should feel free to call your doctor. Pain control: Continue narcotic pain medications as prescribed by your doctor as needed. Ibuprofen (Advil, Motrin) should be the main medication you use for pain control unless otherwise instructed by your doctor. Take ibuprofen with food, and do not take more than is recommended or prescribed. Please use caution if taking extra Acetaminophen (Tylenol) as most narcotic medications already contain this, and taking too much is dangerous. Please call our office or talk to your pharmacist if you have any questions. Bowel Regimen: You may not have a bowel movement for several days post operatively.  Please keep bowl movements soft by adding extra fiber to your diet and drinking plenty of fluids. Please supplement that with:    o Colace 100MG- one tablet 2 times per day (other stool softener is acceptable)  o Miralax powder (laxative) - one tablespoon dissolved in 8 ounce of water of juice every day    If you are unable to have a bowel movement by the 4th or 5th day after your operation, please try some Milk of magnesium or alternatively you may call the office. Once bowel movements are regular and easy, you may gradually wean yourself off of the laxatives and stool softeners. Follow-up appointment: Please call the office for an appointment to see your doctors approximately four weeks after your surgery. Please call the office sooner if you have any questions or concerns. Catheter care (if applicable): if you have had difficulty voiding after the surgery or if your doctor feels it is best you may be sent home with a catheter. If so, rest assured that this is a short term problem. You will be given a leg bad and a large overnight bag on discharge and the appropriate education to care for both catheters. You may shower with the catheter in place. Please call the office to schedule a time to come in within the next few days to have the catheter removed.

## 2021-08-13 NOTE — H&P
History and Physical    Patient: Henry Cordero MRN: [id number]  SSN: [id number]    YOB: 1935  Age: 82 y.o. Sex: female        Chief Complaint:  Pelvic Organ Prolapse  History of Present Illness:  Henry Cordero is a 82 y.o. female with pop. Ms. Kristian Rain has been experiencing prolapse for 10+ years. The prolapse does cause her pain and/or pressure. She does  have to splint to complete urination, a BM, or for comfort. Standing makes her prolapse symptoms worse. Laying down makes her prolapse symptoms better. She has not tried a pessary, she has not tried physical therapy, she has not had surgery for her POP. Ms. Kristian Nolen does not leak urine reguarly. If she does, it is very rare. She has a hard time emptying her bladder and bowels. When she does leak urine it seems to come on with strong urge and she will leak large amounts without being able to stop it. She wears pads in case this happens but not usually. She had a mesh/sling surgery many years ago. Does not remember Physician. Done at [facility]. States when she had her Cholecystomy in 2019 the mesh was \"fused\" to her intestines and caused many problems and scar tissue. Dr. Phu Michael- 10-31-19 \"post cholecystectomy bile leak from duct of [place] and underwent ERCP with metal Wallstent placement and pancreatic duct stent. This was done on July 23. She had had a laparoscopic washout. She had a postoperative bowel obstruction requiring laparotomy. \"      She voids 3 times during the day. She voids 4 times over night. She has 3 BM per week, and does not strain. She drinks 3-4 caffeine drinks beverages per day. She uses 0 artificial sweeteners per day. She drinks 0 alcoholic beverages per week. She has had pelvic surgery in the past. MIKE simms BSO, D&C's, Sling/Mesh  Her last PAP: 45 years ago    Her last Colonoscopy: n/a  Her last Mammogram: 20 years ago    She does not have a history of DM.    Hemoglobin A1c, External   Date Value Ref Range Status   05/11/2015 5.4  Final     She does not have a history of sleep apnea. Tobacco: No    Sexual History: not sexually active. Allergies: Other Medication  Phenergan [Promethazine]  Sulfa (Sulfonamide Antibiotics)    Medications:  No current facility-administered medications for this encounter. Current Outpatient Medications:     dilTIAZem ER (CARDIZEM CD) 240 mg capsule, TAKE 1 CAPSULE DAILY (Patient taking differently: Take 240 mg by mouth daily. TAKE 1 CAPSULE DAILY; Take / use AM day of surgery  per anesthesia protocols. Indications: ventricular rate control in atrial fibrillation), Disp: 90 Capsule, Rfl: 1    diphenhydrAMINE (BENADRYL) 25 mg tablet, Take 25 mg by mouth every six (6) hours as needed. Indications: difficulty sleeping, Disp: , Rfl:     Silvina 128 2 % ophthalmic solution, Administer 1 Drop to both eyes two (2) times a day. For dry eyes; Take / use AM day of surgery  per anesthesia protocols. , Disp: , Rfl:     levothyroxine (SYNTHROID) 100 mcg tablet, Take 1 Tablet by mouth Daily (before breakfast). (Patient taking differently: Take 100 mcg by mouth Daily (before breakfast). Take / use AM day of surgery  per anesthesia protocols. Indications: a condition with low thyroid hormone levels), Disp: 90 Tablet, Rfl: 3    warfarin (COUMADIN) 5 mg tablet, Take 1 Tablet by mouth daily. (Patient taking differently: Take 5 mg by mouth daily. Takes 1 tablet every day M-Saturday and 1/2 tablet on Sundays), Disp: 90 Tablet, Rfl: 3    digoxin (LANOXIN) 0.125 mg tablet, TAKE 1 TABLET EVERY DAY (Patient taking differently: Take 0.125 mg by mouth daily. TAKE 1 TABLET EVERY DAY; Take / use AM day of surgery  per anesthesia protocols. Indications: chronic heart failure), Disp: 90 Tab, Rfl: 3    polyethylene glycol (MIRALAX) 17 gram/dose powder, Take 17 g by mouth daily as needed. 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 MRI safe dual chamber    SSS (sick sinus syndrome) ([address] 03/2009    Stress incontinence     Syncope and collapse 05/2011       Past Surgical History:  Past Surgical History:   Procedure Laterality Date    HX APPENDECTOMY      HX CATARACT REMOVAL Bilateral 2014    HX HYSTERECTOMY      HX LAP CHOLECYSTECTOMY  07/18/2019    HX MITRAL VALVE REPLACEMENT      --REPAIR    HX OTHER SURGICAL      bladder tack~mesh placed    HX OTHER SURGICAL  07/22/2019    exp. lap   [facility] PACEMAKER  05/04/2017    [employer] Model #[id number]   Serial # [id number]    HX THYROIDECTOMY      MS CARDIAC SURG PROCEDURE UNLIST  12/05/2007    mitral valve surgery at [facility]; [employer] Model #[id number];  Mitral valve Annuloplasty band device       Family History:  Family History   Problem Relation Age of Onset    Hypertension Mother 72    Heart Attack Father 79         Social History:  Social History     Tobacco Use   Smoking Status Never Smoker   Smokeless Tobacco Never Used     Social History     Substance and Sexual Activity   Alcohol Use No     Social History     Substance and Sexual Activity   Drug Use No       Physical Exam:  Physical Exam  Exam conducted with a chaperone present. Constitutional:       General: She is not in acute distress. Appearance: Normal appearance. She is well-developed and normal weight.    HENT:      Head: Normocephalic and atraumatic. Nose: Nose normal.   Neck:      Thyroid: No thyroid mass. Trachea: Trachea normal.   Cardiovascular:      Rate and Rhythm: Normal rate. Pulses: Normal pulses. Pulmonary:      Effort: Pulmonary effort is normal. No accessory muscle usage or respiratory distress. Abdominal:      General: There is no distension. Palpations: Abdomen is soft. There is no mass. Tenderness: There is no abdominal tenderness. There is no guarding or rebound. Hernia: No hernia is present. Genitourinary:     Exam position: Supine. Musculoskeletal:         General: No tenderness. Cervical back: Normal range of motion. Skin:     General: Skin is warm and dry. Findings: No erythema, lesion or rash. Neurological:      Mental Status: She is alert and oriented to person, place, and time. Psychiatric:         Mood and Affect: Mood normal.         Speech: Speech normal.         Behavior: Behavior normal. Behavior is cooperative. Thought Content: Thought content normal.         Judgment: Judgment normal. Judgment is not impulsive or inappropriate.          Female Genitourinary   Vulva:    Normal. No lesions  Bartholin's Gland:  Bilateral , Normal, nontender  Skenes Gland:  Bilateral, Normal, nontender   Clitoris:  Normal.   Introitus:    Normal.   Urethral Meatus:  Normal appearing, normal size, no lesions, no prolapse  Urethra:  No masses, no tenderness  Vagina:  No atrophy, no discharge, no lesions  Cervix:  No lesions, no discharge  Uterus:  No tenderness, normal mobility   Adnexa:   No masses palpated, no tenderness  Bladder:  No tenderness, no masses palpated  Perineum:  Normal, no lesions    Rectal   Anorectal Exam: No hemorrhoids and no masses or lesions of the perineum        POP-Q: (Pelvic Organ Prolapse - Quantification Exam):    +3 Aa +7 Ba +7 C   4 gh 3 pb 8.5 tvl   +3 Ap +7 Bp X D     ICS Stage: 4      Pelvic floor muscles: Tender Spasm     R. Puborectalis: NO 0 /5    L. Puborectalis: NO 0 /5    R. Pubococcyg NO 0 /5    L. Pubococcyg NO 0 /5    R. Ileococcyg: NO 0 /5    L. Ileococcyg: NO 0 /5    R. Obturator Int: NO 0 /5    L. Obturator Int: NO 0 /5    R. Coccygeus: NO 0 /5    L. Coccygeus: NO 0 /5      Pelvic floor contractions: 3/5    Supine Stress Test of AMBAR: Negative    Neurological Exam:   Sensorineural Exam:    Bulvocavernosus reflex:  Normal   Anal [place]:  Normal    Assessment and Plan:  Uterovaginal prolapse  Assessment & Plan:  We discussed the epidemiology, pathogenesis and etiology of pelvic organ prolapse. We discussed the potential risk factors which include genetics and environmental factors, such as childbirth, aging, menopause, straining, and previous surgery. I offered her management options which included nothing, pessary, and surgery. We discussed the options of vaginal reconstruction verses obliteration for treatment of her prolapse. I explained the advantages and disadvantages of both techniques. We discussed that the colpocleisis is an extremely effective, quicker, and safer procedure to perform. I explained the technique of closing the vagina with the result being the inability to have vaginal penetration permanently. After extensive counseling regarding this, she opts to proceed with Colpocleisis. She has a perineal defect. I described the pathogenesis of this condition which results from repaired obstetrical trauma which wither did or not heal well or became infected and broke down. I used anatomic aids to explain how this could cause her urinary, bowel, and vaginal complaints. We discussed the technique of repair with the anticipated post-operative course and restrictions of a Perineorrhaphy. I will also perform a cystoscopy to evaluate her bladder anatomy. I described the surgical technique to be employed for her upcoming surgery.  We discussed the anticipated postoperative course    Risks, benefits, indications, and alternatives of the surgery were discussed. Risks reviewed include bleeding, infections, injury to pelvic organs (bowel, bladder, nerves, vessels and ureters), recurrence, dyspareunia, anesthetic complications, and death. The 10% risk of regret was aslo discussed. We discussed that other complications could arise and that the list is too long to discuss comprehensively. She is consented for a colpoclesis and camera in the bladder (cystoscopy). After discussion with Ms Derrek Tse and her daughter this morning they would like to add a midurethral sling to the procedure. Stress Incontinence  We discussed the differential diagnosis of urinary incontinence. We also discussed the pathogenesis and etiology of stress urinary incontinence. I explained the epidemiology of incontinence. I offered her options which include nothing, physical therapy, barrier treatment, and surgery. She is interested in surgical treatment. I described the surgical technique to be employed for her upcoming surgery. We discussed the anticipated postoperative course. TVT cure rates at 6 years is up to 85%. We discussed the properties of polypropylene mesh. We discussed the inert nature and the potential for complication, including infections, rejection, and erosion. The risk of erosion is <2%. We discussed the FDA warning on mesh use. Risks, benefits, indications, and alternatives of the surgery were discussed. Risks reviewed include bleeding, infections, injury to pelvic organs (bladder, nerves, vessels, urethra, and ureters), recurrence, urinary retention, dyspareunia, anesthetic complications, and death. We discussed that other complications could arise and that the list is too long to discuss comprehensively. Ms Derrek Tse is in agreement and all questions were answered. She is consented for colpoclesis, midurethral sling and camera in the bladder (cystoscopy).     Date of Surgery Update:  [facility] seen and examined. History and physical has been reviewed. The patient has been examined.  There have been no significant clinical changes since the completion of the originally dated History and Physical.    Signed By: Nora Anderson DO     August 16, 2021 9:06 AM               Signed By: Nora Anderson DO     August 13, 2021

## 2021-08-15 ENCOUNTER — ANESTHESIA EVENT (OUTPATIENT)
Dept: SURGERY | Age: 86
End: 2021-08-15
Payer: MEDICARE

## 2021-08-16 ENCOUNTER — HOSPITAL ENCOUNTER (OUTPATIENT)
Age: 86
Setting detail: OUTPATIENT SURGERY
Discharge: HOME | End: 2021-08-16
Attending: OBSTETRICS & GYNECOLOGY | Admitting: OBSTETRICS & GYNECOLOGY
Payer: MEDICARE

## 2021-08-16 ENCOUNTER — ANESTHESIA (OUTPATIENT)
Dept: SURGERY | Age: 86
End: 2021-08-16
Payer: MEDICARE

## 2021-08-16 VITALS
DIASTOLIC BLOOD PRESSURE: 60 MMHG | TEMPERATURE: 98 F | RESPIRATION RATE: 16 BRPM | OXYGEN SATURATION: 94 % | HEART RATE: 56 BPM | SYSTOLIC BLOOD PRESSURE: 133 MMHG

## 2021-08-16 DIAGNOSIS — R32 URINARY INCONTINENCE, UNSPECIFIED TYPE: ICD-10-CM

## 2021-08-16 DIAGNOSIS — N81.4 UTEROVAGINAL PROLAPSE: ICD-10-CM

## 2021-08-16 DIAGNOSIS — G89.18 POSTOPERATIVE PAIN: Primary | ICD-10-CM

## 2021-08-16 LAB
INR PPP: 1.1
PROTHROMBIN TIME: 14.7 SEC (ref 12.6–14.5)

## 2021-08-16 PROCEDURE — 2709999900 HC NON-CHARGEABLE SUPPLY: Performed by: OBSTETRICS & GYNECOLOGY

## 2021-08-16 PROCEDURE — 74011250637 HC RX REV CODE- 250/637: Performed by: ANESTHESIOLOGY

## 2021-08-16 PROCEDURE — 77030019927 HC TBNG IRR CYSTO BAXT -A: Performed by: OBSTETRICS & GYNECOLOGY

## 2021-08-16 PROCEDURE — 76210000024 HC REC RM PH II 2.5 TO 3 HR: Performed by: OBSTETRICS & GYNECOLOGY

## 2021-08-16 PROCEDURE — 74011250637 HC RX REV CODE- 250/637: Performed by: OBSTETRICS & GYNECOLOGY

## 2021-08-16 PROCEDURE — 76210000016 HC OR PH I REC 1 TO 1.5 HR: Performed by: OBSTETRICS & GYNECOLOGY

## 2021-08-16 PROCEDURE — 77030008462 HC STPLR SKN PROX J&J -A: Performed by: OBSTETRICS & GYNECOLOGY

## 2021-08-16 PROCEDURE — 77030040922 HC BLNKT HYPOTHRM STRY -A: Performed by: ANESTHESIOLOGY

## 2021-08-16 PROCEDURE — 77030040361 HC SLV COMPR DVT MDII -B: Performed by: OBSTETRICS & GYNECOLOGY

## 2021-08-16 PROCEDURE — 57288 REPAIR BLADDER DEFECT: CPT | Performed by: OBSTETRICS & GYNECOLOGY

## 2021-08-16 PROCEDURE — 77030002966 HC SUT PDS J&J -A: Performed by: OBSTETRICS & GYNECOLOGY

## 2021-08-16 PROCEDURE — 77030031139 HC SUT VCRL2 J&J -A: Performed by: OBSTETRICS & GYNECOLOGY

## 2021-08-16 PROCEDURE — 76060000035 HC ANESTHESIA 2 TO 2.5 HR: Performed by: OBSTETRICS & GYNECOLOGY

## 2021-08-16 PROCEDURE — 74011250636 HC RX REV CODE- 250/636: Performed by: STUDENT IN AN ORGANIZED HEALTH CARE EDUCATION/TRAINING PROGRAM

## 2021-08-16 PROCEDURE — 77030021052 HC RNG RETRCTR STAY COOP -A: Performed by: OBSTETRICS & GYNECOLOGY

## 2021-08-16 PROCEDURE — 74011000250 HC RX REV CODE- 250: Performed by: OBSTETRICS & GYNECOLOGY

## 2021-08-16 PROCEDURE — C1771 REP DEV, URINARY, W/SLING: HCPCS | Performed by: OBSTETRICS & GYNECOLOGY

## 2021-08-16 PROCEDURE — 77030037088 HC TUBE ENDOTRACH ORAL NSL COVD-A: Performed by: ANESTHESIOLOGY

## 2021-08-16 PROCEDURE — 77030019908 HC STETH ESOPH SIMS -A: Performed by: ANESTHESIOLOGY

## 2021-08-16 PROCEDURE — 77030039425 HC BLD LARYNG TRULITE DISP TELE -A: Performed by: ANESTHESIOLOGY

## 2021-08-16 PROCEDURE — 74011000250 HC RX REV CODE- 250: Performed by: STUDENT IN AN ORGANIZED HEALTH CARE EDUCATION/TRAINING PROGRAM

## 2021-08-16 PROCEDURE — 74011250636 HC RX REV CODE- 250/636: Performed by: OBSTETRICS & GYNECOLOGY

## 2021-08-16 PROCEDURE — 77030003029 HC SUT VCRL J&J -B: Performed by: OBSTETRICS & GYNECOLOGY

## 2021-08-16 PROCEDURE — 57110 VAGNC COMPL RMVL VAG WALL: CPT | Performed by: OBSTETRICS & GYNECOLOGY

## 2021-08-16 PROCEDURE — 77030040830 HC CATH URETH FOL MDII -A: Performed by: OBSTETRICS & GYNECOLOGY

## 2021-08-16 PROCEDURE — 85610 PROTHROMBIN TIME: CPT

## 2021-08-16 PROCEDURE — 77030008064 HC RNG RETRCTR COOP -B: Performed by: OBSTETRICS & GYNECOLOGY

## 2021-08-16 PROCEDURE — 77030010507 HC ADH SKN DERMBND J&J -B: Performed by: OBSTETRICS & GYNECOLOGY

## 2021-08-16 PROCEDURE — 76010000171 HC OR TIME 2 TO 2.5 HR INTENSV-TIER 1: Performed by: OBSTETRICS & GYNECOLOGY

## 2021-08-16 DEVICE — TRANSVAGINAL MID-URETHRAL SLING
Type: IMPLANTABLE DEVICE | Site: BLADDER | Status: FUNCTIONAL
Brand: ADVANTAGE™ SYSTEM

## 2021-08-16 RX ORDER — ACETAMINOPHEN 500 MG
1000 TABLET ORAL ONCE
Status: COMPLETED | OUTPATIENT
Start: 2021-08-16 | End: 2021-08-16

## 2021-08-16 RX ORDER — FENTANYL CITRATE 50 UG/ML
INJECTION, SOLUTION INTRAMUSCULAR; INTRAVENOUS AS NEEDED
Status: DISCONTINUED | OUTPATIENT
Start: 2021-08-16 | End: 2021-08-16 | Stop reason: HOSPADM

## 2021-08-16 RX ORDER — PROPOFOL 10 MG/ML
INJECTION, EMULSION INTRAVENOUS AS NEEDED
Status: DISCONTINUED | OUTPATIENT
Start: 2021-08-16 | End: 2021-08-16 | Stop reason: HOSPADM

## 2021-08-16 RX ORDER — SODIUM CHLORIDE, SODIUM LACTATE, POTASSIUM CHLORIDE, CALCIUM CHLORIDE 600; 310; 30; 20 MG/100ML; MG/100ML; MG/100ML; MG/100ML
INJECTION, SOLUTION INTRAVENOUS
Status: DISCONTINUED | OUTPATIENT
Start: 2021-08-16 | End: 2021-08-16 | Stop reason: HOSPADM

## 2021-08-16 RX ORDER — ONDANSETRON 2 MG/ML
INJECTION INTRAMUSCULAR; INTRAVENOUS AS NEEDED
Status: DISCONTINUED | OUTPATIENT
Start: 2021-08-16 | End: 2021-08-16 | Stop reason: HOSPADM

## 2021-08-16 RX ORDER — POLYETHYLENE GLYCOL 3350 17 G/17G
17 POWDER, FOR SOLUTION ORAL DAILY
Qty: 30 PACKET | Refills: 3 | Status: SHIPPED | OUTPATIENT
Start: 2021-08-16

## 2021-08-16 RX ORDER — LIDOCAINE HYDROCHLORIDE 10 MG/ML
0.1 INJECTION INFILTRATION; PERINEURAL AS NEEDED
Status: DISCONTINUED | OUTPATIENT
Start: 2021-08-16 | End: 2021-08-16 | Stop reason: HOSPADM

## 2021-08-16 RX ORDER — CEFAZOLIN SODIUM/WATER 2 G/20 ML
2 SYRINGE (ML) INTRAVENOUS ONCE
Status: COMPLETED | OUTPATIENT
Start: 2021-08-16 | End: 2021-08-16

## 2021-08-16 RX ORDER — HYDROMORPHONE HYDROCHLORIDE 2 MG/1
2 TABLET ORAL
Qty: 10 TABLET | Refills: 0 | Status: SHIPPED | OUTPATIENT
Start: 2021-08-16 | End: 2021-08-19

## 2021-08-16 RX ORDER — DEXAMETHASONE SODIUM PHOSPHATE 4 MG/ML
INJECTION, SOLUTION INTRA-ARTICULAR; INTRALESIONAL; INTRAMUSCULAR; INTRAVENOUS; SOFT TISSUE AS NEEDED
Status: DISCONTINUED | OUTPATIENT
Start: 2021-08-16 | End: 2021-08-16 | Stop reason: HOSPADM

## 2021-08-16 RX ORDER — HYDROMORPHONE HYDROCHLORIDE 1 MG/ML
0.5 INJECTION, SOLUTION INTRAMUSCULAR; INTRAVENOUS; SUBCUTANEOUS
Status: DISCONTINUED | OUTPATIENT
Start: 2021-08-16 | End: 2021-08-16 | Stop reason: HOSPADM

## 2021-08-16 RX ORDER — LIDOCAINE HYDROCHLORIDE AND EPINEPHRINE 10; 10 MG/ML; UG/ML
INJECTION, SOLUTION INFILTRATION; PERINEURAL AS NEEDED
Status: DISCONTINUED | OUTPATIENT
Start: 2021-08-16 | End: 2021-08-16 | Stop reason: HOSPADM

## 2021-08-16 RX ORDER — DIPHENHYDRAMINE HYDROCHLORIDE 50 MG/ML
12.5 INJECTION, SOLUTION INTRAMUSCULAR; INTRAVENOUS ONCE
Status: DISCONTINUED | OUTPATIENT
Start: 2021-08-16 | End: 2021-08-16 | Stop reason: HOSPADM

## 2021-08-16 RX ORDER — MIDAZOLAM HYDROCHLORIDE 1 MG/ML
2 INJECTION, SOLUTION INTRAMUSCULAR; INTRAVENOUS ONCE
Status: DISCONTINUED | OUTPATIENT
Start: 2021-08-16 | End: 2021-08-16 | Stop reason: HOSPADM

## 2021-08-16 RX ORDER — MIDAZOLAM HYDROCHLORIDE 1 MG/ML
2 INJECTION, SOLUTION INTRAMUSCULAR; INTRAVENOUS
Status: DISCONTINUED | OUTPATIENT
Start: 2021-08-16 | End: 2021-08-16 | Stop reason: HOSPADM

## 2021-08-16 RX ORDER — ROCURONIUM BROMIDE 10 MG/ML
INJECTION, SOLUTION INTRAVENOUS AS NEEDED
Status: DISCONTINUED | OUTPATIENT
Start: 2021-08-16 | End: 2021-08-16 | Stop reason: HOSPADM

## 2021-08-16 RX ORDER — NEOSTIGMINE METHYLSULFATE 1 MG/ML
INJECTION, SOLUTION INTRAVENOUS AS NEEDED
Status: DISCONTINUED | OUTPATIENT
Start: 2021-08-16 | End: 2021-08-16 | Stop reason: HOSPADM

## 2021-08-16 RX ORDER — OXYCODONE HYDROCHLORIDE 5 MG/1
5 TABLET ORAL
Status: COMPLETED | OUTPATIENT
Start: 2021-08-16 | End: 2021-08-16

## 2021-08-16 RX ORDER — OXYCODONE HYDROCHLORIDE 5 MG/1
10 TABLET ORAL
Status: DISCONTINUED | OUTPATIENT
Start: 2021-08-16 | End: 2021-08-16 | Stop reason: HOSPADM

## 2021-08-16 RX ORDER — NALOXONE HYDROCHLORIDE 0.4 MG/ML
0.1 INJECTION, SOLUTION INTRAMUSCULAR; INTRAVENOUS; SUBCUTANEOUS AS NEEDED
Status: DISCONTINUED | OUTPATIENT
Start: 2021-08-16 | End: 2021-08-16 | Stop reason: HOSPADM

## 2021-08-16 RX ORDER — ONDANSETRON 2 MG/ML
4 INJECTION INTRAMUSCULAR; INTRAVENOUS ONCE
Status: DISCONTINUED | OUTPATIENT
Start: 2021-08-16 | End: 2021-08-16 | Stop reason: HOSPADM

## 2021-08-16 RX ORDER — GLYCOPYRROLATE 0.2 MG/ML
INJECTION INTRAMUSCULAR; INTRAVENOUS AS NEEDED
Status: DISCONTINUED | OUTPATIENT
Start: 2021-08-16 | End: 2021-08-16 | Stop reason: HOSPADM

## 2021-08-16 RX ORDER — PHENAZOPYRIDINE HYDROCHLORIDE 95 MG/1
95 TABLET ORAL ONCE
Status: COMPLETED | OUTPATIENT
Start: 2021-08-16 | End: 2021-08-16

## 2021-08-16 RX ORDER — DOCUSATE SODIUM 100 MG/1
100 CAPSULE, LIQUID FILLED ORAL 2 TIMES DAILY
Qty: 60 CAPSULE | Refills: 2 | Status: SHIPPED | OUTPATIENT
Start: 2021-08-16 | End: 2021-11-14

## 2021-08-16 RX ORDER — FENTANYL CITRATE 50 UG/ML
100 INJECTION, SOLUTION INTRAMUSCULAR; INTRAVENOUS ONCE
Status: DISCONTINUED | OUTPATIENT
Start: 2021-08-16 | End: 2021-08-16 | Stop reason: HOSPADM

## 2021-08-16 RX ORDER — SODIUM CHLORIDE, SODIUM LACTATE, POTASSIUM CHLORIDE, CALCIUM CHLORIDE 600; 310; 30; 20 MG/100ML; MG/100ML; MG/100ML; MG/100ML
50 INJECTION, SOLUTION INTRAVENOUS CONTINUOUS
Status: CANCELLED | OUTPATIENT
Start: 2021-08-16

## 2021-08-16 RX ORDER — LIDOCAINE HYDROCHLORIDE 20 MG/ML
INJECTION, SOLUTION EPIDURAL; INFILTRATION; INTRACAUDAL; PERINEURAL AS NEEDED
Status: DISCONTINUED | OUTPATIENT
Start: 2021-08-16 | End: 2021-08-16 | Stop reason: HOSPADM

## 2021-08-16 RX ADMIN — ONDANSETRON 4 MG: 2 INJECTION INTRAMUSCULAR; INTRAVENOUS at 11:00

## 2021-08-16 RX ADMIN — FENTANYL CITRATE 50 MCG: 50 INJECTION INTRAMUSCULAR; INTRAVENOUS at 11:21

## 2021-08-16 RX ADMIN — LIDOCAINE HYDROCHLORIDE 90 MG: 20 INJECTION, SOLUTION EPIDURAL; INFILTRATION; INTRACAUDAL; PERINEURAL at 09:17

## 2021-08-16 RX ADMIN — URINARY PAIN RELIEF 95 MG: 95 TABLET ORAL at 08:05

## 2021-08-16 RX ADMIN — Medication 3 MG: at 11:02

## 2021-08-16 RX ADMIN — PHENYLEPHRINE HYDROCHLORIDE 100 MCG: 10 INJECTION INTRAVENOUS at 10:28

## 2021-08-16 RX ADMIN — ACETAMINOPHEN 1000 MG: 500 TABLET, FILM COATED ORAL at 08:05

## 2021-08-16 RX ADMIN — PHENYLEPHRINE HYDROCHLORIDE 100 MCG: 10 INJECTION INTRAVENOUS at 09:53

## 2021-08-16 RX ADMIN — ROCURONIUM BROMIDE 50 MG: 10 INJECTION, SOLUTION INTRAVENOUS at 09:17

## 2021-08-16 RX ADMIN — DEXAMETHASONE SODIUM PHOSPHATE 10 MG: 4 INJECTION, SOLUTION INTRAMUSCULAR; INTRAVENOUS at 09:32

## 2021-08-16 RX ADMIN — FENTANYL CITRATE 50 MCG: 50 INJECTION INTRAMUSCULAR; INTRAVENOUS at 09:17

## 2021-08-16 RX ADMIN — GLYCOPYRROLATE 0.4 MG: 0.2 INJECTION, SOLUTION INTRAMUSCULAR; INTRAVENOUS at 11:02

## 2021-08-16 RX ADMIN — OXYCODONE HYDROCHLORIDE 5 MG: 5 TABLET ORAL at 12:06

## 2021-08-16 RX ADMIN — CEFAZOLIN 2 G: 1 INJECTION, POWDER, FOR SOLUTION INTRAVENOUS at 09:27

## 2021-08-16 RX ADMIN — PROPOFOL 150 MG: 10 INJECTION, EMULSION INTRAVENOUS at 09:17

## 2021-08-16 RX ADMIN — SODIUM CHLORIDE, SODIUM LACTATE, POTASSIUM CHLORIDE, AND CALCIUM CHLORIDE: 600; 310; 30; 20 INJECTION, SOLUTION INTRAVENOUS at 09:11

## 2021-08-16 NOTE — OP NOTES
Procedure:  1. Total Vaginectomy  2. Mid-urethral Sling  3. Cystoscopy    Preoperative Diagnosis:   1. Anterior Wall Prolapse  2. Apical Wall Prolapse  3. Posterior Wall Prolapse  4. Stress urinary Incontinence    Postoperative Diagnosis:   1. Anterior Wall Prolapse  2. Apical Wall Prolapse  3. Posterior Wall Prolapse  4. Stress urinary Incontinence    No responsible provider has been recorded for the case. EBL: 50cc    IVF: 800cc    Urine Output: 500cc    Specimen: None      Intraoperative findings: On cystoscopy at the completion of the case, there was no evidence of tumor or mass. The ureteral orifices were in normal anatomical position. There was efflux seen bilaterally. The urethra was normal. There were no complications. Indications for procedure: This is an 82year old female with symptomatic uterovaginal prolapse protruding beyond the opening of the vagina. She has elected to have this surgically corrected. Procedure in detail:  The patient was brought back to the operating room. Compression boots were placed and activated. She was then sedated and general anesthesia was performed without any complications. She was positioned in the dorsal lithotomy position in [place] stirrups in a neurologically safe position. She was then prepped and draped in a sterile fashion. A Gray catheter was placed. Two allis clamps were placed at the apex of the vagina. I then injected the anterior and posterior vagina with  lidocaine and epinephrine and marked out an area of excision. I then sharply excised the vaginal skin in the anterior and posterior vagina as previously marked. Hemostasis was achieved with the bovie. I then used a running 2-0 PDS suture to close the pubocervical fascial edge apically with the rectovaginal septum edge apically to the over the cuff and cul-de-sac. I then imbricated this with a 2-0 PDS running suture.  This brought us to the epithelium which was then closed with a running 2-0 vicryl suture. This completed the colpocleisis. There was good support. I then performed the midurethral sling. At this point, we performed the mid urethral sling as follows: The anterior vaginal mucosa overlying the mid urethral region was superficially injected with 1% lidocaine, 1:100,000 epinephrine. A vertical midline incision was made approximately 2 cm in length with a scalpel. Metzenbaum scissors were then used to dissect the vaginal mucosa off the underlying fascia and bladder out underneath the pubic symphysis bilaterally. TVT trocar was then placed through the vaginal tunnel underneath the pubic symphysis through the retropubic space and out through the anterior abdominal wall just above the pubic symphysis. This process was then repeated on the contralateral side following the same anatomic principles. Gray catheter was removed. Cystourethroscopy was then performed with the above noted findings. The scope was then removed. The catheter was replaced. Trocars and TVT polypropylene tape were advanced out through the retropubic space and through the abdominal skin incisions. Placement was confirmed to be in the midurethral region and not under tension using a spacer. Plastic sheaths were then removed and the ends of tape were cut just below the abdominal incision. Placement was again confirmed to be not under tension using a spacer. Following this, the incision was then irrigated and appeared hemostatic. The vaginal mucosa was then closed with running 2-0 Vicryl suture. Excellent hemostasis was noted. Suprapubic skin incisions were closed with Dermabond. Two Allis clamps were placed along the posterior fourchette just off the midline. A third Allis clamp was placed in the posterior wall of the vagina . Using a scalpel, a lauro-shaped piece of perineal skin was cut between the 2 Allis clamps.  Using the scissors, the bulbocavernosus and the superficial transverse perineal muscles were dissected in a lateral fashion. The vaginal epithelium was dissected off the rectovaginal fascia. Once this was done, then using 2-0 PDS suture, the lower bulbocavernosus and the superficial transverse perineal muscles were plicated in the midline in interrupted fashion x3. Once this was done, then using 2-0 Vicryl suture, the posterior wall was closed in a running fashion. Rectal exam was performed and was normal. Sponge, lap, needle and instrument counts were correct. The patient was repositioned in supine position and anesthesia was reversed without difficulty. The patient tolerated procedure well and was taken to recovery in stable condition.

## 2021-08-16 NOTE — PERIOP NOTES
JENKINS DRAINED  CC DARK YELLOW URINE      BLADDER FILLED WITH 300 CC STERILE WATER AND JENKINS CATHETER REMOVED

## 2021-08-16 NOTE — ANESTHESIA POSTPROCEDURE EVALUATION
Procedure(s):  COLPOCLEISIS  SUBURETHRAL SLING/[employer].    general    Anesthesia Post Evaluation      Multimodal analgesia: multimodal analgesia used between 6 hours prior to anesthesia start to PACU discharge  Patient location during evaluation: bedside  Patient participation: complete - patient participated  Level of consciousness: responsive to verbal stimuli  Pain management: adequate  Airway patency: patent  Anesthetic complications: no  Cardiovascular status: hemodynamically stable  Respiratory status: spontaneous ventilation  Hydration status: stable    Final Post Anesthesia Temperature Assessment:  Normothermia (36.0-37.5 degrees C)      INITIAL Post-op Vital signs:   Vitals Value Taken Time   /62 08/16/21 1126   Temp 36.7 °C (98 °F) 08/16/21 1121   Pulse 71 08/16/21 1131   Resp 12 08/16/21 1131   SpO2 99 % 08/16/21 1131

## 2021-08-16 NOTE — ANESTHESIA PREPROCEDURE EVALUATION
Relevant Problems   CARDIOVASCULAR   (+) Mitral valve insufficiency   (+) Permanent atrial fibrillation (HCC)   (+) Presence of cardiac pacemaker   (+) Sick sinus syndrome (HCC)      ENDOCRINE   (+) Acquired hypothyroidism       Anesthetic History   No history of anesthetic complications            Review of Systems / Medical History  Patient summary reviewed and pertinent labs reviewed    Pulmonary  Within defined limits                 Neuro/Psych   Within defined limits           Cardiovascular            Dysrhythmias (Cardizem, Digoxin, Coumadin (held))   Pacemaker (DDDR for SSS)      Comments: S/p MVR    2020 Echo with preserved EF   GI/Hepatic/Renal  Within defined limits              Endo/Other      Hypothyroidism       Other Findings   Comments: Dizziness/Vertigo           Physical Exam    Airway  Mallampati: II  TM Distance: 4 - 6 cm  Neck ROM: normal range of motion   Mouth opening: Normal     Cardiovascular    Rhythm: irregular  Rate: normal         Dental    Dentition: Full upper dentures     Pulmonary  Breath sounds clear to auscultation               Abdominal  GI exam deferred       Other Findings            Anesthetic Plan    ASA: 3  Anesthesia type: general          Induction: Intravenous  Anesthetic plan and risks discussed with: Patient      RUTH

## 2021-08-16 NOTE — PERIOP NOTES
16 FR JENKINS CATH WITH OSD INSERTED USING ASEPTIC TECHNIQUE    NO C/O DISTRESS FROM PATIENT   CLEAR YELLOW URINE DRAINING     STAT LOCK IN USE     PT WILL BE CALLED BY DR Srinivas Varela OFFICE TOMORROW   PTS DAUGHTER AWARE

## 2021-08-18 PROBLEM — R33.8 POSTOPERATIVE RETENTION OF URINE: Status: ACTIVE | Noted: 2021-08-18

## 2021-08-18 PROBLEM — N99.89 POSTOPERATIVE RETENTION OF URINE: Status: ACTIVE | Noted: 2021-08-18

## 2022-03-18 PROBLEM — Z98.890 S/P MITRAL VALVE REPAIR: Status: ACTIVE | Noted: 2017-03-09

## 2022-03-18 PROBLEM — R33.8 POSTOPERATIVE RETENTION OF URINE: Status: ACTIVE | Noted: 2021-08-18

## 2022-03-18 PROBLEM — N99.89 POSTOPERATIVE RETENTION OF URINE: Status: ACTIVE | Noted: 2021-08-18

## 2022-03-19 PROBLEM — R53.1 WEAKNESS: Status: ACTIVE | Noted: 2017-03-09

## 2022-03-19 PROBLEM — Z79.899 ENCOUNTER FOR LONG-TERM (CURRENT) USE OF OTHER MEDICATIONS: Status: ACTIVE | Noted: 2020-04-30

## 2022-03-19 PROBLEM — N81.4 UTEROVAGINAL PROLAPSE: Status: ACTIVE | Noted: 2021-06-10

## 2022-03-19 PROBLEM — Z79.01 ANTICOAGULANT LONG-TERM USE: Status: ACTIVE | Noted: 2018-11-05

## 2022-03-20 PROBLEM — R32 URINARY INCONTINENCE: Status: ACTIVE | Noted: 2021-06-10

## 2022-03-20 PROBLEM — E03.9 ACQUIRED HYPOTHYROIDISM: Status: ACTIVE | Noted: 2019-10-15

## 2022-05-24 ENCOUNTER — OFFICE VISIT (OUTPATIENT)
Dept: CARDIOLOGY CLINIC | Age: 87
End: 2022-05-24
Payer: MEDICARE

## 2022-05-24 VITALS
SYSTOLIC BLOOD PRESSURE: 130 MMHG | BODY MASS INDEX: 23.25 KG/M2 | HEIGHT: 69 IN | HEART RATE: 68 BPM | DIASTOLIC BLOOD PRESSURE: 60 MMHG | WEIGHT: 157 LBS

## 2022-05-24 DIAGNOSIS — I48.21 PERMANENT ATRIAL FIBRILLATION (HCC): Primary | ICD-10-CM

## 2022-05-24 DIAGNOSIS — Z98.890 S/P MITRAL VALVE REPAIR: ICD-10-CM

## 2022-05-24 DIAGNOSIS — I49.5 SICK SINUS SYNDROME (HCC): ICD-10-CM

## 2022-05-24 PROCEDURE — 4004F PT TOBACCO SCREEN RCVD TLK: CPT | Performed by: INTERNAL MEDICINE

## 2022-05-24 PROCEDURE — G8400 PT W/DXA NO RESULTS DOC: HCPCS | Performed by: INTERNAL MEDICINE

## 2022-05-24 PROCEDURE — G8420 CALC BMI NORM PARAMETERS: HCPCS | Performed by: INTERNAL MEDICINE

## 2022-05-24 PROCEDURE — G8427 DOCREV CUR MEDS BY ELIG CLIN: HCPCS | Performed by: INTERNAL MEDICINE

## 2022-05-24 PROCEDURE — 1123F ACP DISCUSS/DSCN MKR DOCD: CPT | Performed by: INTERNAL MEDICINE

## 2022-05-24 PROCEDURE — 1090F PRES/ABSN URINE INCON ASSESS: CPT | Performed by: INTERNAL MEDICINE

## 2022-05-24 PROCEDURE — 99214 OFFICE O/P EST MOD 30 MIN: CPT | Performed by: INTERNAL MEDICINE

## 2022-05-24 NOTE — PROGRESS NOTES
[facility] CARDIOLOGY  [address] [facility], [address]  PHONE: [phone]      22    NAME:  Katia Saenz  : 1935  MRN: [id number]       SUBJECTIVE:   Katia Saenz is a 82 y.o. female seen for a follow up visit regarding the following:     Chief Complaint   Patient presents with    Irregular Heart Beat         HPI:    No cp. No orthopnea or pnd. No palpitations or syncope. Mild myers persists    Past Medical History, Past Surgical History, Family history, Social History, and Medications were all reviewed with the patient today and updated as necessary. Current Outpatient Medications   Medication Sig Dispense Refill    Misc Natural Products (LUTEIN 20) CAPS Take 20 mg by mouth daily      digoxin (LANOXIN) 125 MCG tablet TAKE 1 TABLET DAILY      dilTIAZem (TIAZAC) 240 MG extended release capsule TAKE 1 CAPSULE DAILY      diphenhydrAMINE (SOMINEX) 25 MG tablet Take 25 mg by mouth every 6 hours as needed      levothyroxine (SYNTHROID) 100 MCG tablet Take 100 mcg by mouth every morning (before breakfast)      polyethylene glycol (GLYCOLAX) 17 GM/SCOOP powder Take 17 g by mouth daily      sodium chloride (MARAL 128) 2 % ophthalmic solution Apply 1 drop to eye 2 times daily      warfarin (COUMADIN) 5 MG tablet Take 5 mg by mouth daily       No current facility-administered medications for this visit. Social History     Tobacco Use    Smoking status: Never Smoker    Smokeless tobacco: Never Used   Substance Use Topics    Alcohol use: No              PHYSICAL EXAM:   /60   Pulse 68   Ht 5' 9\" (1.753 m)   Wt 157 lb (71.2 kg)   BMI 23.18 kg/m²    Constitutional: Oriented to person, place, and time. Appears well-developed and well-nourished. Head: Normocephalic and atraumatic. Neck: Neck supple. Cardiovascular: Normal rate and regular rhythm with no murmur -No JVP  Pulmonary/Chest: Breath sounds normal.   Abdominal: Soft. Musculoskeletal: No edema. Neurological: Alert and oriented to person, place, and time. Skin: Skin is warm and dry. Psychiatric: Normal mood and affect. Vitals reviewed. Wt Readings from Last 3 Encounters:   05/24/22 157 lb (71.2 kg)   06/10/21 155 lb (70.3 kg)   05/19/21 156 lb 6.4 oz (70.9 kg)       Medical problems and test results were reviewed with the patient today. ASSESSMENT and PLAN    1. Permanent atrial fibrillation (HCC)  Stable. Continue current medical therapy. Continue coumadin 5mg qd- no bleeding issues    2. S/P mitral valve repair  Stable. Continue current medical therapy. 3. Sick sinus syndrome ([address]- s/p pm  Stable. Continue current medical therapy.    continue interrogation       Return in about 6 months (around 11/24/2022) for af.         Lorenzo Escoto MD  5/24/2022  9:59 AM

## 2022-05-25 LAB — INR BLD: 1.9

## 2022-05-26 ENCOUNTER — ANTI-COAG VISIT (OUTPATIENT)
Dept: INTERNAL MEDICINE CLINIC | Facility: CLINIC | Age: 87
End: 2022-05-26

## 2022-05-26 DIAGNOSIS — I48.21 PERMANENT ATRIAL FIBRILLATION (HCC): Primary | ICD-10-CM

## 2022-05-26 DIAGNOSIS — Z79.01 ANTICOAGULANT LONG-TERM USE: ICD-10-CM

## 2022-05-26 NOTE — PROGRESS NOTES
Anticoagulation Episode Summary     Current INR goal:  2.0-3.0   TTR:  --   Next INR check:  6/1/2022   INR from last check:  1.9 (5/25/2022)   Weekly max warfarin dose:     Target end date: Indefinite   INR check location:  Home Draw   Preferred lab:     Send INR reminders to:       Indications    Permanent atrial fibrillation (HCC) [I48.21]  Anticoagulant long-term use [Z79.01]           Comments:           Anticoagulation Care Providers     Provider Role Specialty Phone number    Adriano Cruz MD [facility] Internal Medicine [phone]

## 2022-06-01 ENCOUNTER — ANTI-COAG VISIT (OUTPATIENT)
Dept: INTERNAL MEDICINE CLINIC | Facility: CLINIC | Age: 87
End: 2022-06-01

## 2022-06-01 DIAGNOSIS — Z79.01 ANTICOAGULANT LONG-TERM USE: ICD-10-CM

## 2022-06-01 DIAGNOSIS — I48.21 PERMANENT ATRIAL FIBRILLATION (HCC): Primary | ICD-10-CM

## 2022-06-01 LAB — INR BLD: 2.6

## 2022-06-01 NOTE — PROGRESS NOTES
Anticoagulation Episode Summary     Current INR goal:  2.0-3.0   TTR:  --   Next INR check:  6/8/2022   INR from last check:  2.60 (6/1/2022)   Weekly max warfarin dose:     Target end date: Indefinite   INR check location:  Home Draw   Preferred lab:     Send INR reminders to:       Indications    Permanent atrial fibrillation (HCC) [I48.21]  Anticoagulant long-term use [Z79.01]           Comments:           Anticoagulation Care Providers     Provider Role Specialty Phone number    Irina Leiva MD [facility] Internal Medicine [phone]

## 2022-06-06 RX ORDER — WARFARIN SODIUM 5 MG/1
5 TABLET ORAL DAILY
Qty: 90 TABLET | Refills: 3 | Status: SHIPPED | OUTPATIENT
Start: 2022-06-06 | End: 2022-06-15 | Stop reason: SDUPTHER

## 2022-06-06 RX ORDER — LEVOTHYROXINE SODIUM 0.1 MG/1
100 TABLET ORAL
Qty: 90 TABLET | Refills: 3 | Status: SHIPPED | OUTPATIENT
Start: 2022-06-06 | End: 2022-06-15

## 2022-06-06 NOTE — TELEPHONE ENCOUNTER
Pt is calling to request a refill for the following medication:    Warfarin 5 mg tablet    Levothyroxine 100 mcg tablet    [facility]

## 2022-06-08 ENCOUNTER — ANTI-COAG VISIT (OUTPATIENT)
Dept: INTERNAL MEDICINE CLINIC | Facility: CLINIC | Age: 87
End: 2022-06-08

## 2022-06-08 DIAGNOSIS — Z79.01 ANTICOAGULANT LONG-TERM USE: ICD-10-CM

## 2022-06-08 DIAGNOSIS — I48.21 PERMANENT ATRIAL FIBRILLATION (HCC): Primary | ICD-10-CM

## 2022-06-08 LAB — INR BLD: 2.8

## 2022-06-08 NOTE — PROGRESS NOTES
Anticoagulation Episode Summary     Current INR goal:  2.0-3.0   TTR:  100.0 % (3 d)   Next INR check:  6/15/2022   INR from last check:  2.80 (6/8/2022)   Weekly max warfarin dose:     Target end date: Indefinite   INR check location:  Home Draw   Preferred lab:     Send INR reminders to:       Indications    Permanent atrial fibrillation (HCC) [I48.21]  Anticoagulant long-term use [Z79.01]           Comments:           Anticoagulation Care Providers     Provider Role Specialty Phone number    Perez Atwood MD [facility] Internal Medicine [phone]

## 2022-06-13 RX ORDER — DILTIAZEM HYDROCHLORIDE 240 MG/1
CAPSULE, COATED, EXTENDED RELEASE ORAL
Qty: 90 CAPSULE | Refills: 3 | Status: SHIPPED | OUTPATIENT
Start: 2022-06-13

## 2022-06-15 ENCOUNTER — ANTI-COAG VISIT (OUTPATIENT)
Dept: INTERNAL MEDICINE CLINIC | Facility: CLINIC | Age: 87
End: 2022-06-15

## 2022-06-15 DIAGNOSIS — I48.21 PERMANENT ATRIAL FIBRILLATION (HCC): Primary | ICD-10-CM

## 2022-06-15 DIAGNOSIS — Z79.01 ANTICOAGULANT LONG-TERM USE: ICD-10-CM

## 2022-06-15 LAB — INR BLD: 3.4

## 2022-06-15 RX ORDER — WARFARIN SODIUM 5 MG/1
5 TABLET ORAL DAILY
Qty: 90 TABLET | Refills: 3 | Status: SHIPPED | OUTPATIENT
Start: 2022-06-15

## 2022-06-15 RX ORDER — LEVOTHYROXINE SODIUM 0.1 MG/1
100 TABLET ORAL
Qty: 90 TABLET | Refills: 3 | Status: SHIPPED | OUTPATIENT
Start: 2022-06-15

## 2022-06-15 NOTE — PROGRESS NOTES
Anticoagulation Episode Summary     Current INR goal:  2.0-3.0   TTR:  53.3 % (1.4 wk)   Next INR check:  6/22/2022   INR from last check:  3.40 (6/15/2022)   Weekly max warfarin dose:     Target end date: Indefinite   INR check location:  Home Draw   Preferred lab:     Send INR reminders to:       Indications    Permanent atrial fibrillation (HCC) [I48.21]  Anticoagulant long-term use [Z79.01]           Comments:           Anticoagulation Care Providers     Provider Role Specialty Phone number    Ramyundo Freire MD [facility] Internal Medicine [phone]

## 2022-06-15 NOTE — TELEPHONE ENCOUNTER
Pt is calling to request a refill for the following medications:    Levothyroxine 100 mcg tablet    Warfarin 5 mg tablet    Pt stated that these need to go to the [facility] mail order pharmacy.

## 2022-06-22 LAB — INR BLD: 2.1

## 2022-06-23 ENCOUNTER — ANTI-COAG VISIT (OUTPATIENT)
Dept: INTERNAL MEDICINE CLINIC | Facility: CLINIC | Age: 87
End: 2022-06-23

## 2022-06-23 DIAGNOSIS — Z79.01 ANTICOAGULANT LONG-TERM USE: ICD-10-CM

## 2022-06-23 DIAGNOSIS — I48.21 PERMANENT ATRIAL FIBRILLATION (HCC): Primary | ICD-10-CM

## 2022-06-23 NOTE — PROGRESS NOTES
Anticoagulation Summary  As of 2022    INR goal:  2.0-3.0   TTR:  59.9 % (2.4 wk)   INR used for dosin.10 (2022)   Warfarin maintenance plan:  2.5 mg (5 mg x 0.5) every Sun, Tue; 5 mg (5 mg x 1) all other days   Weekly warfarin total:  30 mg   Plan last modified:  Laurita Jackson MA (6/15/2022)   Next INR check:  2022   Target end date:   Indefinite    Indications    Permanent atrial fibrillation (HCC) [I48.21]  Anticoagulant long-term use [Z79.01]             Anticoagulation Episode Summary     INR check location:  Home Draw    Preferred lab:      Send INR reminders to:      Comments:        Anticoagulation Care Providers     Provider Role Specialty Phone number    Gary Martinez MD [facility] Internal Medicine [phone]

## 2022-06-24 ENCOUNTER — TELEMEDICINE (OUTPATIENT)
Dept: INTERNAL MEDICINE CLINIC | Facility: CLINIC | Age: 87
End: 2022-06-24
Payer: MEDICARE

## 2022-06-24 DIAGNOSIS — I48.21 PERMANENT ATRIAL FIBRILLATION (HCC): ICD-10-CM

## 2022-06-24 DIAGNOSIS — R53.1 WEAKNESS: ICD-10-CM

## 2022-06-24 DIAGNOSIS — E03.9 ACQUIRED HYPOTHYROIDISM: Primary | ICD-10-CM

## 2022-06-24 PROCEDURE — 99442 PR PHYS/QHP TELEPHONE EVALUATION 11-20 MIN: CPT | Performed by: INTERNAL MEDICINE

## 2022-06-24 SDOH — ECONOMIC STABILITY: FOOD INSECURITY: WITHIN THE PAST 12 MONTHS, YOU WORRIED THAT YOUR FOOD WOULD RUN OUT BEFORE YOU GOT MONEY TO BUY MORE.: NEVER TRUE

## 2022-06-24 SDOH — ECONOMIC STABILITY: FOOD INSECURITY: WITHIN THE PAST 12 MONTHS, THE FOOD YOU BOUGHT JUST DIDN'T LAST AND YOU DIDN'T HAVE MONEY TO GET MORE.: NEVER TRUE

## 2022-06-24 ASSESSMENT — PATIENT HEALTH QUESTIONNAIRE - PHQ9
1. LITTLE INTEREST OR PLEASURE IN DOING THINGS: 1
2. FEELING DOWN, DEPRESSED OR HOPELESS: 1
SUM OF ALL RESPONSES TO PHQ QUESTIONS 1-9: 2
SUM OF ALL RESPONSES TO PHQ9 QUESTIONS 1 & 2: 2

## 2022-06-24 ASSESSMENT — SOCIAL DETERMINANTS OF HEALTH (SDOH): HOW HARD IS IT FOR YOU TO PAY FOR THE VERY BASICS LIKE FOOD, HOUSING, MEDICAL CARE, AND HEATING?: NOT HARD AT ALL

## 2022-06-24 NOTE — PROGRESS NOTES
[address] (:  1935) is a Established patient, here for evaluation of the following: This patient states she is doing well overall she has had no falls at home. Transportation is difficult for her. She was recently seen by cardiology and was having no problems with shortness of breath chest pain. No lower extremity edema is noted. Her weekly INR test's are therapeutic within range. Assessment & Plan   Below is the assessment and plan developed based on review of pertinent history, physical exam, labs, studies, and medications. 1. Acquired hypothyroidism  2. Weakness  -     Comprehensive Metabolic Panel; Future  -     CBC with Auto Differential; Future  -     TSH; Future  3. Permanent atrial fibrillation (HCC)  -     Digoxin Level; Future    Return in about 6 months (around 2022). Subjective   HPI  Review of Systems       Objective   Patient-Reported Vitals  Patient-Reported Systolic (Top): 961 mmHg  Patient-Reported Diastolic (Bottom): 75 mmHg  Patient-Reported Pulse: 72       Physical Exam            Labs are ordered for this patient and will be scheduled  On this date 2022 I have spent 15 minutes reviewing previous notes, test results and face to face (virtual) with the patient discussing the diagnosis and importance of compliance with the treatment plan as well as documenting on the day of the visit. Ricco Fitch,[address], was evaluated through a synchronous (real-time) audio-video encounter. The patient (or guardian if applicable) is aware that this is a billable service, which includes applicable co-pays. This Virtual Visit was conducted with patient's (and/or legal guardian's) consent. The visit was conducted pursuant to the emergency declaration under the [facility], [address] [place] [address] [facility] authority and the [place] [employer] and [employer] Act.   Patient identification was verified, and a caregiver was present

## 2022-06-29 ENCOUNTER — ANTI-COAG VISIT (OUTPATIENT)
Dept: INTERNAL MEDICINE CLINIC | Facility: CLINIC | Age: 87
End: 2022-06-29

## 2022-06-29 DIAGNOSIS — I48.21 PERMANENT ATRIAL FIBRILLATION (HCC): Primary | ICD-10-CM

## 2022-06-29 DIAGNOSIS — Z79.01 ANTICOAGULANT LONG-TERM USE: ICD-10-CM

## 2022-06-29 LAB — INR BLD: 1.9

## 2022-06-29 NOTE — PROGRESS NOTES
Please eat fewer greens. Change dosage to 5 mg all days but 2.5 mg on sund and Wednesday starting next week. This week, take a full 5 mg tonight. Thanks.   Hosea Mejía

## 2022-07-06 ENCOUNTER — ANTI-COAG VISIT (OUTPATIENT)
Dept: INTERNAL MEDICINE CLINIC | Facility: CLINIC | Age: 87
End: 2022-07-06

## 2022-07-06 DIAGNOSIS — Z79.01 ANTICOAGULANT LONG-TERM USE: ICD-10-CM

## 2022-07-06 DIAGNOSIS — I48.21 PERMANENT ATRIAL FIBRILLATION (HCC): Primary | ICD-10-CM

## 2022-07-06 LAB — INR BLD: 2.4

## 2022-07-06 NOTE — PROGRESS NOTES
Anticoagulation Summary  As of 2022    INR goal:  2.0-3.0   TTR:  62.2 % (1 mo)   INR used for dosin.40 (2022)   Warfarin maintenance plan:  2.5 mg (5 mg x 0.5) every Sun, Wed; 5 mg (5 mg x 1) all other days   Weekly warfarin total:  30 mg   Plan last modified:  Saddie Cowden, MD (2022)   Next INR check:  2022   Target end date:   Indefinite    Indications    Permanent atrial fibrillation (HCC) [I48.21]  Anticoagulant long-term use [Z79.01]             Anticoagulation Episode Summary     INR check location:  Home Draw    Preferred lab:      Send INR reminders to:      Comments:        Anticoagulation Care Providers     Provider Role Specialty Phone number    Christa Geronimo MD [facility] Internal Medicine [phone]

## 2022-07-08 ENCOUNTER — NURSE ONLY (OUTPATIENT)
Dept: INTERNAL MEDICINE CLINIC | Facility: CLINIC | Age: 87
End: 2022-07-08

## 2022-07-08 DIAGNOSIS — R53.1 WEAKNESS: ICD-10-CM

## 2022-07-08 DIAGNOSIS — I48.21 PERMANENT ATRIAL FIBRILLATION (HCC): ICD-10-CM

## 2022-07-08 LAB
BASOPHILS # BLD: 0.1 K/UL (ref 0–0.2)
BASOPHILS NFR BLD: 1 % (ref 0–2)
DIFFERENTIAL METHOD BLD: ABNORMAL
EOSINOPHIL # BLD: 0.1 K/UL (ref 0–0.8)
EOSINOPHIL NFR BLD: 1 % (ref 0.5–7.8)
ERYTHROCYTE [DISTWIDTH] IN BLOOD BY AUTOMATED COUNT: 12.5 % (ref 11.9–14.6)
HCT VFR BLD AUTO: 41.8 % (ref 35.8–46.3)
HGB BLD-MCNC: 13.6 G/DL (ref 11.7–15.4)
IMM GRANULOCYTES # BLD AUTO: 0 K/UL (ref 0–0.5)
IMM GRANULOCYTES NFR BLD AUTO: 0 % (ref 0–5)
LYMPHOCYTES # BLD: 2 K/UL (ref 0.5–4.6)
LYMPHOCYTES NFR BLD: 25 % (ref 13–44)
MCH RBC QN AUTO: 31.4 PG (ref 26.1–32.9)
MCHC RBC AUTO-ENTMCNC: 32.5 G/DL (ref 31.4–35)
MCV RBC AUTO: 96.5 FL (ref 79.6–97.8)
MONOCYTES # BLD: 0.5 K/UL (ref 0.1–1.3)
MONOCYTES NFR BLD: 6 % (ref 4–12)
NEUTS SEG # BLD: 5.5 K/UL (ref 1.7–8.2)
NEUTS SEG NFR BLD: 68 % (ref 43–78)
NRBC # BLD: 0 K/UL (ref 0–0.2)
PLATELET # BLD AUTO: 191 K/UL (ref 150–450)
PMV BLD AUTO: 13.3 FL (ref 9.4–12.3)
RBC # BLD AUTO: 4.33 M/UL (ref 4.05–5.2)
WBC # BLD AUTO: 8.2 K/UL (ref 4.3–11.1)

## 2022-07-09 LAB
ALBUMIN SERPL-MCNC: 4.2 G/DL (ref 3.2–4.6)
ALBUMIN/GLOB SERPL: 1.4 {RATIO} (ref 1.2–3.5)
ALP SERPL-CCNC: 118 U/L (ref 50–136)
ALT SERPL-CCNC: 21 U/L (ref 12–65)
ANION GAP SERPL CALC-SCNC: 8 MMOL/L (ref 7–16)
AST SERPL-CCNC: 25 U/L (ref 15–37)
BILIRUB SERPL-MCNC: 0.5 MG/DL (ref 0.2–1.1)
BUN SERPL-MCNC: 12 MG/DL (ref 8–23)
CALCIUM SERPL-MCNC: 9.4 MG/DL (ref 8.3–10.4)
CHLORIDE SERPL-SCNC: 108 MMOL/L (ref 98–107)
CO2 SERPL-SCNC: 25 MMOL/L (ref 21–32)
CREAT SERPL-MCNC: 1 MG/DL (ref 0.6–1)
DIGOXIN SERPL-MCNC: 1 NG/ML (ref 0.9–2.1)
GLOBULIN SER CALC-MCNC: 3 G/DL (ref 2.3–3.5)
GLUCOSE SERPL-MCNC: 114 MG/DL (ref 65–100)
POTASSIUM SERPL-SCNC: 4.2 MMOL/L (ref 3.5–5.1)
PROT SERPL-MCNC: 7.2 G/DL (ref 6.3–8.2)
SODIUM SERPL-SCNC: 141 MMOL/L (ref 136–145)
TSH, 3RD GENERATION: 0.8 UIU/ML (ref 0.36–3.74)

## 2022-07-11 ENCOUNTER — TELEPHONE (OUTPATIENT)
Dept: INTERNAL MEDICINE CLINIC | Facility: CLINIC | Age: 87
End: 2022-07-11

## 2022-07-11 DIAGNOSIS — T46.0X1A POISONING BY DIGOXIN, ACCIDENTAL OR UNINTENTIONAL, INITIAL ENCOUNTER: Primary | ICD-10-CM

## 2022-07-11 NOTE — TELEPHONE ENCOUNTER
----- Message from Baldomero Freeman MD sent at 7/11/2022  7:36 AM EDT -----  Call pt, her digoxin level is elevated, hold for 2 days and then take every other day, repeat digoxin level in 2 weeks , ordered cms

## 2022-07-11 NOTE — TELEPHONE ENCOUNTER
Patient was notified and requested call daughter Yifan Vyas as well. Patient did write all information down. appt has been scheduled for mon July 25    Would like to know if there is anything that daughter should be aware of because they leave for the beach next week and patient will be riding etc. Just wants to make sure of what to expect.

## 2022-07-13 LAB — INR BLD: 1.8

## 2022-07-14 ENCOUNTER — ANTI-COAG VISIT (OUTPATIENT)
Dept: INTERNAL MEDICINE CLINIC | Facility: CLINIC | Age: 87
End: 2022-07-14

## 2022-07-14 ENCOUNTER — TELEPHONE (OUTPATIENT)
Dept: INTERNAL MEDICINE CLINIC | Facility: CLINIC | Age: 87
End: 2022-07-14

## 2022-07-14 DIAGNOSIS — I48.21 PERMANENT ATRIAL FIBRILLATION (HCC): Primary | ICD-10-CM

## 2022-07-14 DIAGNOSIS — Z79.01 ANTICOAGULANT LONG-TERM USE: ICD-10-CM

## 2022-07-14 DIAGNOSIS — J40 BRONCHITIS: Primary | ICD-10-CM

## 2022-07-14 RX ORDER — AZITHROMYCIN 250 MG/1
250 TABLET, FILM COATED ORAL SEE ADMIN INSTRUCTIONS
Qty: 6 TABLET | Refills: 0 | Status: SHIPPED | OUTPATIENT
Start: 2022-07-14 | End: 2022-07-19

## 2022-07-14 NOTE — TELEPHONE ENCOUNTER
Pt daughter called and stated that patient is having conjestion and cough and that they did a home covid test that was negative yesterday. Pt daughter would like to know if the patient needs to be seen or if something can be called in.

## 2022-07-14 NOTE — TELEPHONE ENCOUNTER
COLD SX    FEVER? No    COUGH? Coughing  If yes, Dry or Productive: Productive; Yellow in Color      NASAL? Yes  If yes, Stuffy or Productive: Productive  If productive, document color: Clear    SORE THROAT? Yes, started with a sore throat but its better now    CHILLS? No    Chest Congestion? Yes      MEDS? Congestion and Cough OTC  If yes, list all Meds taken and duration: Started Meds today    TESTED FOR COVID? Yes; Home Covid Test and it Was Negative      VACCINATION STATUS? Yes    HOW LONG HAVE YOU EXPERIENCED: THE ABOVE?  Tues  ALLERGIES: Sulfa and Promethazine  PHARMACY:[facility]

## 2022-07-14 NOTE — PROGRESS NOTES
Anticoagulation Summary  As of 2022    INR goal:  2.0-3.0   TTR:  63.0 % (1.3 mo)   INR used for dosin.80 (2022)   Warfarin maintenance plan:  2.5 mg (5 mg x 0.5) every Sun, Wed; 5 mg (5 mg x 1) all other days   Weekly warfarin total:  30 mg   Plan last modified:  Jesi Walker MD (2022)   Next INR check:  2022   Target end date:   Indefinite    Indications    Permanent atrial fibrillation (HCC) [I48.21]  Anticoagulant long-term use [Z79.01]             Anticoagulation Episode Summary     INR check location:  Home Draw    Preferred lab:      Send INR reminders to:      Comments:        Anticoagulation Care Providers     Provider Role Specialty Phone number    Bel Moss MD [facility] Internal Medicine [phone]

## 2022-07-18 ENCOUNTER — ANTI-COAG VISIT (OUTPATIENT)
Dept: INTERNAL MEDICINE CLINIC | Facility: CLINIC | Age: 87
End: 2022-07-18

## 2022-07-18 DIAGNOSIS — I48.21 PERMANENT ATRIAL FIBRILLATION (HCC): Primary | ICD-10-CM

## 2022-07-18 DIAGNOSIS — Z79.01 ANTICOAGULANT LONG-TERM USE: ICD-10-CM

## 2022-07-18 LAB — INR BLD: 2.3

## 2022-07-18 NOTE — PROGRESS NOTES
Anticoagulation Summary  As of 2022      INR goal:  2.0-3.0   TTR:  62.7 % (1.4 mo)   INR used for dosin.30 (2022)   Warfarin maintenance plan:  2.5 mg (5 mg x 0.5) every Sun; 5 mg (5 mg x 1) all other days   Weekly warfarin total:  32.5 mg   Plan last modified:  Iván Jamil MA (2022)   Next INR check:  2022   Target end date:   Indefinite    Indications    Permanent atrial fibrillation (HCC) [I48.21]  Anticoagulant long-term use [Z79.01]                 Anticoagulation Episode Summary       INR check location:  Home Draw    Preferred lab:      Send INR reminders to:      Comments:            Anticoagulation Care Providers       Provider Role Specialty Phone number    Efraín Louis MD [facility] Internal Medicine [phone]

## 2022-07-25 ENCOUNTER — NURSE ONLY (OUTPATIENT)
Dept: INTERNAL MEDICINE CLINIC | Facility: CLINIC | Age: 87
End: 2022-07-25

## 2022-07-25 DIAGNOSIS — T46.0X1A POISONING BY DIGOXIN, ACCIDENTAL OR UNINTENTIONAL, INITIAL ENCOUNTER: ICD-10-CM

## 2022-07-25 LAB — DIGOXIN SERPL-MCNC: 0.5 NG/ML (ref 0.9–2.1)

## 2022-07-25 RX ORDER — DIGOXIN 125 MCG
TABLET ORAL
Qty: 90 TABLET | Refills: 3 | Status: SHIPPED | OUTPATIENT
Start: 2022-07-25

## 2022-07-27 LAB — INR BLD: 2.3

## 2022-07-28 ENCOUNTER — TELEPHONE (OUTPATIENT)
Dept: INTERNAL MEDICINE CLINIC | Facility: CLINIC | Age: 87
End: 2022-07-28

## 2022-07-28 ENCOUNTER — ANTI-COAG VISIT (OUTPATIENT)
Dept: INTERNAL MEDICINE CLINIC | Facility: CLINIC | Age: 87
End: 2022-07-28

## 2022-07-28 DIAGNOSIS — Z79.01 ANTICOAGULANT LONG-TERM USE: ICD-10-CM

## 2022-07-28 DIAGNOSIS — I48.21 PERMANENT ATRIAL FIBRILLATION (HCC): Primary | ICD-10-CM

## 2022-07-28 NOTE — PROGRESS NOTES
Anticoagulation Summary  As of 2022      INR goal:  2.0-3.0   TTR:  69.1 % (1.7 mo)   INR used for dosin.30 (2022)   Warfarin maintenance plan:  2.5 mg (5 mg x 0.5) every Sun; 5 mg (5 mg x 1) all other days   Weekly warfarin total:  32.5 mg   Plan last modified:  Silvia Maciel MA (2022)   Next INR check:  8/3/2022   Target end date:   Indefinite    Indications    Permanent atrial fibrillation (HCC) [I48.21]  Anticoagulant long-term use [Z79.01]                 Anticoagulation Episode Summary       INR check location:  Home Draw    Preferred lab:      Send INR reminders to:      Comments:            Anticoagulation Care Providers       Provider Role Specialty Phone number    Yuridia Funes MD [facility] Internal Medicine [phone]

## 2022-07-28 NOTE — TELEPHONE ENCOUNTER
----- Message from Alexander Segura MD sent at 7/28/2022  8:19 AM EDT -----  Notify the patient that her dig level is now slightly low so take her digoxin daily but leave off Monday and Thursdays cms

## 2022-08-03 ENCOUNTER — ANTI-COAG VISIT (OUTPATIENT)
Dept: INTERNAL MEDICINE CLINIC | Facility: CLINIC | Age: 87
End: 2022-08-03

## 2022-08-03 DIAGNOSIS — Z79.01 ANTICOAGULANT LONG-TERM USE: ICD-10-CM

## 2022-08-03 DIAGNOSIS — I48.21 PERMANENT ATRIAL FIBRILLATION (HCC): Primary | ICD-10-CM

## 2022-08-03 LAB
INR BLD: 1.7
INR BLD: 1.7

## 2022-08-03 NOTE — PROGRESS NOTES
Anticoagulation Summary  As of 8/3/2022      INR goal:  2.0-3.0   TTR:  66.9 % (2 mo)   INR used for dosin.70 (8/3/2022)   Warfarin maintenance plan:  2.5 mg (5 mg x 0.5) every Sun; 5 mg (5 mg x 1) all other days   Weekly warfarin total:  32.5 mg   Plan last modified:  Daisy Mena MA (2022)   Next INR check:  8/10/2022   Target end date:   Indefinite    Indications    Permanent atrial fibrillation (HCC) [I48.21]  Anticoagulant long-term use [Z79.01]                 Anticoagulation Episode Summary       INR check location:  Home Draw    Preferred lab:      Send INR reminders to:      Comments:            Anticoagulation Care Providers       Provider Role Specialty Phone number    Nancy Salgado MD [facility] Internal Medicine [phone]

## 2022-08-04 ENCOUNTER — TELEPHONE (OUTPATIENT)
Dept: INTERNAL MEDICINE CLINIC | Facility: CLINIC | Age: 87
End: 2022-08-04

## 2022-08-04 DIAGNOSIS — Z79.01 ANTICOAGULANT LONG-TERM USE: ICD-10-CM

## 2022-08-04 DIAGNOSIS — I48.21 PERMANENT ATRIAL FIBRILLATION (HCC): Primary | ICD-10-CM

## 2022-08-10 LAB — INR BLD: 2.6

## 2022-08-11 ENCOUNTER — ANTI-COAG VISIT (OUTPATIENT)
Dept: INTERNAL MEDICINE CLINIC | Facility: CLINIC | Age: 87
End: 2022-08-11

## 2022-08-11 DIAGNOSIS — Z79.01 ANTICOAGULANT LONG-TERM USE: ICD-10-CM

## 2022-08-11 DIAGNOSIS — I48.21 PERMANENT ATRIAL FIBRILLATION (HCC): Primary | ICD-10-CM

## 2022-08-17 LAB — INR BLD: 3

## 2022-08-18 ENCOUNTER — ANTI-COAG VISIT (OUTPATIENT)
Dept: INTERNAL MEDICINE CLINIC | Facility: CLINIC | Age: 87
End: 2022-08-18

## 2022-08-18 DIAGNOSIS — Z79.01 ANTICOAGULANT LONG-TERM USE: ICD-10-CM

## 2022-08-18 DIAGNOSIS — I48.21 PERMANENT ATRIAL FIBRILLATION (HCC): Primary | ICD-10-CM

## 2022-08-18 NOTE — PROGRESS NOTES
Anticoagulation Summary  As of 8/18/2022      INR goal:  2.0-3.0   TTR:  69.8 % (2.4 mo)   INR used for dosing:  3.00 (8/17/2022)   Warfarin maintenance plan:  5 mg (5 mg x 1) every day   Weekly warfarin total:  35 mg   Plan last modified:  Mani Nichole MA (8/4/2022)   Next INR check:  8/24/2022   Target end date:   Indefinite    Indications    Permanent atrial fibrillation (HCC) [I48.21]  Anticoagulant long-term use [Z79.01]                 Anticoagulation Episode Summary       INR check location:  Home Draw    Preferred lab:      Send INR reminders to:      Comments:            Anticoagulation Care Providers       Provider Role Specialty Phone number    Vika Barrow MD [facility] Internal Medicine [phone]

## 2022-08-24 ENCOUNTER — ANTI-COAG VISIT (OUTPATIENT)
Dept: INTERNAL MEDICINE CLINIC | Facility: CLINIC | Age: 87
End: 2022-08-24

## 2022-08-24 DIAGNOSIS — Z79.01 ANTICOAGULANT LONG-TERM USE: ICD-10-CM

## 2022-08-24 DIAGNOSIS — I48.21 PERMANENT ATRIAL FIBRILLATION (HCC): Primary | ICD-10-CM

## 2022-08-24 LAB — INR BLD: 3.1

## 2022-08-24 NOTE — PROGRESS NOTES
Anticoagulation Summary  As of 8/24/2022      INR goal:  2.0-3.0   TTR:  63.7 % (2.7 mo)   INR used for dosing:  3.10 (8/24/2022)   Warfarin maintenance plan:  5 mg (5 mg x 1) every day   Weekly warfarin total:  35 mg   Plan last modified:  Pj Segal MA (8/4/2022)   Next INR check:  8/24/2022   Target end date:   Indefinite    Indications    Permanent atrial fibrillation (HCC) [I48.21]  Anticoagulant long-term use [Z79.01]                 Anticoagulation Episode Summary       INR check location:  Home Draw    Preferred lab:      Send INR reminders to:      Comments:            Anticoagulation Care Providers       Provider Role Specialty Phone number    Butch Nayak MD [facility] Internal Medicine [phone]

## 2022-08-25 NOTE — PROGRESS NOTES
Warfarin 2.5 mg thu 5 mg others cms     Pt notified of the dose change and verbally understands. anc

## 2022-08-29 PROCEDURE — 93296 REM INTERROG EVL PM/IDS: CPT | Performed by: INTERNAL MEDICINE

## 2022-08-29 PROCEDURE — 93294 REM INTERROG EVL PM/LDLS PM: CPT | Performed by: INTERNAL MEDICINE

## 2022-08-31 LAB — INR BLD: 2.7

## 2022-09-01 ENCOUNTER — ANTI-COAG VISIT (OUTPATIENT)
Dept: INTERNAL MEDICINE CLINIC | Facility: CLINIC | Age: 87
End: 2022-09-01

## 2022-09-01 DIAGNOSIS — Z79.01 ANTICOAGULANT LONG-TERM USE: ICD-10-CM

## 2022-09-01 DIAGNOSIS — I48.21 PERMANENT ATRIAL FIBRILLATION (HCC): Primary | ICD-10-CM

## 2022-09-01 NOTE — PROGRESS NOTES
Anticoagulation Summary  As of 2022      INR goal:  2.0-3.0   TTR:  64.7 % (2.9 mo)   INR used for dosin.70 (2022)   Warfarin maintenance plan:  2.5 mg (5 mg x 0.5) every Thu; 5 mg (5 mg x 1) all other days   Weekly warfarin total:  32.5 mg   Plan last modified:  Timmy Lorenz MA (2022)   Next INR check:  2022   Target end date:   Indefinite    Indications    Permanent atrial fibrillation (HCC) [I48.21]  Anticoagulant long-term use [Z79.01]                 Anticoagulation Episode Summary       INR check location:  Home Draw    Preferred lab:      Send INR reminders to:      Comments:            Anticoagulation Care Providers       Provider Role Specialty Phone number    Alexander Segura MD [facility] Internal Medicine [phone]

## 2022-09-07 ENCOUNTER — ANTI-COAG VISIT (OUTPATIENT)
Dept: INTERNAL MEDICINE CLINIC | Facility: CLINIC | Age: 87
End: 2022-09-07

## 2022-09-07 DIAGNOSIS — Z79.01 ANTICOAGULANT LONG-TERM USE: ICD-10-CM

## 2022-09-07 DIAGNOSIS — I48.21 PERMANENT ATRIAL FIBRILLATION (HCC): Primary | ICD-10-CM

## 2022-09-07 LAB — INR BLD: 2.7

## 2022-09-07 NOTE — PROGRESS NOTES
Anticoagulation Summary  As of 2022      INR goal:  2.0-3.0   TTR:  67.4 % (3.1 mo)   INR used for dosin.70 (2022)   Warfarin maintenance plan:  2.5 mg (5 mg x 0.5) every Thu; 5 mg (5 mg x 1) all other days   Weekly warfarin total:  32.5 mg   Plan last modified:  Rozanne Bamberger, MA (2022)   Next INR check:  2022   Target end date:   Indefinite    Indications    Permanent atrial fibrillation (HCC) [I48.21]  Anticoagulant long-term use [Z79.01]                 Anticoagulation Episode Summary       INR check location:  Home Draw    Preferred lab:      Send INR reminders to:      Comments:            Anticoagulation Care Providers       Provider Role Specialty Phone number    Brigido Albarran MD [facility] Internal Medicine [phone]

## 2022-09-14 ENCOUNTER — ANTI-COAG VISIT (OUTPATIENT)
Dept: INTERNAL MEDICINE CLINIC | Facility: CLINIC | Age: 87
End: 2022-09-14

## 2022-09-14 DIAGNOSIS — Z79.01 ANTICOAGULANT LONG-TERM USE: ICD-10-CM

## 2022-09-14 DIAGNOSIS — I48.21 PERMANENT ATRIAL FIBRILLATION (HCC): Primary | ICD-10-CM

## 2022-09-14 LAB — INR BLD: 2.1

## 2022-09-23 ENCOUNTER — ANTI-COAG VISIT (OUTPATIENT)
Dept: INTERNAL MEDICINE CLINIC | Facility: CLINIC | Age: 87
End: 2022-09-23

## 2022-09-23 DIAGNOSIS — I48.21 PERMANENT ATRIAL FIBRILLATION (HCC): Primary | ICD-10-CM

## 2022-09-23 DIAGNOSIS — Z79.01 ANTICOAGULANT LONG-TERM USE: ICD-10-CM

## 2022-09-23 LAB — INR BLD: 2.4

## 2022-09-23 NOTE — PROGRESS NOTES
Anticoagulation Episode Summary       Current INR goal:  2.0-3.0   TTR:  72.2 % (3.7 mo)   Next INR check:  9/30/2022   INR from last check:  2.40 (9/23/2022)   Weekly max warfarin dose:     Target end date: Indefinite   INR check location:  Home Draw   Preferred lab:     Send INR reminders to:       Indications    Permanent atrial fibrillation (HCC) [I48.21]  Anticoagulant long-term use [Z79.01]             Comments:               Anticoagulation Care Providers       Provider Role Specialty Phone number    Alexander Segura MD [facility] Internal Medicine [phone]

## 2022-09-28 ENCOUNTER — ANTI-COAG VISIT (OUTPATIENT)
Dept: INTERNAL MEDICINE CLINIC | Facility: CLINIC | Age: 87
End: 2022-09-28

## 2022-09-28 DIAGNOSIS — Z79.01 ANTICOAGULANT LONG-TERM USE: ICD-10-CM

## 2022-09-28 DIAGNOSIS — I48.21 PERMANENT ATRIAL FIBRILLATION (HCC): Primary | ICD-10-CM

## 2022-09-28 LAB — INR BLD: 2.4

## 2022-10-05 LAB — INR BLD: 2.6

## 2022-10-06 ENCOUNTER — ANTI-COAG VISIT (OUTPATIENT)
Dept: INTERNAL MEDICINE CLINIC | Facility: CLINIC | Age: 87
End: 2022-10-06

## 2022-10-06 DIAGNOSIS — I48.21 PERMANENT ATRIAL FIBRILLATION (HCC): Primary | ICD-10-CM

## 2022-10-06 DIAGNOSIS — Z79.01 ANTICOAGULANT LONG-TERM USE: ICD-10-CM

## 2022-10-06 NOTE — PROGRESS NOTES
Anticoagulation Summary  As of 10/6/2022      INR goal:  2.0-3.0   TTR:  75.0 % (4.1 mo)   INR used for dosin.60 (10/5/2022)   Warfarin maintenance plan:  2.5 mg (5 mg x 0.5) every Thu; 5 mg (5 mg x 1) all other days   Weekly warfarin total:  32.5 mg   Plan last modified:  Blanquita Portillo MA (2022)   Next INR check:  10/12/2022   Target end date:   Indefinite    Indications    Permanent atrial fibrillation (HCC) [I48.21]  Anticoagulant long-term use [Z79.01]                 Anticoagulation Episode Summary       INR check location:  Home Draw    Preferred lab:      Send INR reminders to:      Comments:            Anticoagulation Care Providers       Provider Role Specialty Phone number    Rashid Kapadia MD [facility] Internal Medicine [phone]

## 2022-10-12 ENCOUNTER — ANTI-COAG VISIT (OUTPATIENT)
Dept: INTERNAL MEDICINE CLINIC | Facility: CLINIC | Age: 87
End: 2022-10-12

## 2022-10-12 DIAGNOSIS — Z79.01 ANTICOAGULANT LONG-TERM USE: ICD-10-CM

## 2022-10-12 DIAGNOSIS — I48.21 PERMANENT ATRIAL FIBRILLATION (HCC): Primary | ICD-10-CM

## 2022-10-12 LAB — INR BLD: 2.8

## 2022-10-12 NOTE — PROGRESS NOTES
Anticoagulation Summary  As of 10/12/2022      INR goal:  2.0-3.0   TTR:  76.3 % (4.3 mo)   INR used for dosin.80 (10/12/2022)   Warfarin maintenance plan:  2.5 mg (5 mg x 0.5) every Thu; 5 mg (5 mg x 1) all other days   Weekly warfarin total:  32.5 mg   Plan last modified:  Sandra Wetzel MA (2022)   Next INR check:  10/19/2022   Target end date:   Indefinite    Indications    Permanent atrial fibrillation (HCC) [I48.21]  Anticoagulant long-term use [Z79.01]                 Anticoagulation Episode Summary       INR check location:  Home Draw    Preferred lab:      Send INR reminders to:      Comments:            Anticoagulation Care Providers       Provider Role Specialty Phone number    Nate Curran MD [facility] Internal Medicine [phone]

## 2022-10-13 DIAGNOSIS — Z95.0 PRESENCE OF CARDIAC PACEMAKER: ICD-10-CM

## 2022-10-13 DIAGNOSIS — Z95.0 PRESENCE OF CARDIAC PACEMAKER: Primary | ICD-10-CM

## 2022-10-13 DIAGNOSIS — I49.5 SICK SINUS SYNDROME (HCC): ICD-10-CM

## 2022-10-13 DIAGNOSIS — I48.21 PERMANENT ATRIAL FIBRILLATION (HCC): ICD-10-CM

## 2022-10-20 LAB — INR BLD: 3.4

## 2022-10-21 ENCOUNTER — ANTI-COAG VISIT (OUTPATIENT)
Dept: INTERNAL MEDICINE CLINIC | Facility: CLINIC | Age: 87
End: 2022-10-21

## 2022-10-21 DIAGNOSIS — Z79.01 ANTICOAGULANT LONG-TERM USE: ICD-10-CM

## 2022-10-21 DIAGNOSIS — I48.21 PERMANENT ATRIAL FIBRILLATION (HCC): Primary | ICD-10-CM

## 2022-10-21 NOTE — PROGRESS NOTES
Please contact patient and inquire  Any  change in meds or any new meds or change in diet? Bulmaro Ledbetter MD     Have patient taking 1/2 tablete on M/Th.   Continue with regular weekly Protime checks

## 2022-10-21 NOTE — PROGRESS NOTES
Anticoagulation Episode Summary       Current INR goal:  2.0-3.0   TTR:  73.7 % (4.6 mo)   Next INR check:  10/27/2022   INR from last check:  3.40 (10/20/2022)   Weekly max warfarin dose:     Target end date: Indefinite   INR check location:  Home Draw   Preferred lab:     Send INR reminders to:       Indications    Permanent atrial fibrillation (HCC) [I48.21]  Anticoagulant long-term use [Z79.01]             Comments:               Anticoagulation Care Providers       Provider Role Specialty Phone number    Salazar Osuna MD [facility] Internal Medicine [phone]

## 2022-10-26 ENCOUNTER — ANTI-COAG VISIT (OUTPATIENT)
Dept: INTERNAL MEDICINE CLINIC | Facility: CLINIC | Age: 87
End: 2022-10-26

## 2022-10-26 DIAGNOSIS — Z79.01 ANTICOAGULANT LONG-TERM USE: ICD-10-CM

## 2022-10-26 DIAGNOSIS — I48.21 PERMANENT ATRIAL FIBRILLATION (HCC): Primary | ICD-10-CM

## 2022-10-26 LAB — INR BLD: 2.3

## 2022-10-26 NOTE — PROGRESS NOTES
Anticoagulation Summary  As of 10/26/2022      INR goal:  2.0-3.0   TTR:  73.3 % (4.8 mo)   INR used for dosin.30 (10/26/2022)   Warfarin maintenance plan:  2.5 mg (5 mg x 0.5) every Mon, Thu; 5 mg (5 mg x 1) all other days; Starting 10/26/2022   Weekly warfarin total:  30 mg   Plan last modified:  Cristina Delcid MD (10/21/2022)   Next INR check:  2022   Target end date:   Indefinite    Indications    Permanent atrial fibrillation (HCC) [I48.21]  Anticoagulant long-term use [Z79.01]                 Anticoagulation Episode Summary       INR check location:  Home Draw    Preferred lab:      Send INR reminders to:      Comments:            Anticoagulation Care Providers       Provider Role Specialty Phone number    Cam Cruz MD [facility] Internal Medicine [phone]

## 2022-11-02 ENCOUNTER — ANTI-COAG VISIT (OUTPATIENT)
Dept: INTERNAL MEDICINE CLINIC | Facility: CLINIC | Age: 87
End: 2022-11-02

## 2022-11-02 DIAGNOSIS — Z79.01 ANTICOAGULANT LONG-TERM USE: ICD-10-CM

## 2022-11-02 DIAGNOSIS — I48.21 PERMANENT ATRIAL FIBRILLATION (HCC): Primary | ICD-10-CM

## 2022-11-02 LAB — INR BLD: 2.6

## 2022-11-02 NOTE — PROGRESS NOTES
Anticoagulation Summary  As of 2022      INR goal:  2.0-3.0   TTR:  74.6 % (5 mo)   INR used for dosin.60 (2022)   Warfarin maintenance plan:  2.5 mg (5 mg x 0.5) every Mon, Thu; 5 mg (5 mg x 1) all other days; Starting 2022   Weekly warfarin total:  30 mg   Plan last modified:  Shannon Pulido MD (10/21/2022)   Next INR check:  2022   Target end date:   Indefinite    Indications    Permanent atrial fibrillation (HCC) [I48.21]  Anticoagulant long-term use [Z79.01]                 Anticoagulation Episode Summary       INR check location:  Home Draw    Preferred lab:      Send INR reminders to:      Comments:            Anticoagulation Care Providers       Provider Role Specialty Phone number    Kareen Washnigton MD [facility] Internal Medicine [phone]

## 2022-11-09 ENCOUNTER — ANTI-COAG VISIT (OUTPATIENT)
Dept: INTERNAL MEDICINE CLINIC | Facility: CLINIC | Age: 87
End: 2022-11-09

## 2022-11-09 DIAGNOSIS — I48.21 PERMANENT ATRIAL FIBRILLATION (HCC): Primary | ICD-10-CM

## 2022-11-09 DIAGNOSIS — Z79.01 ANTICOAGULANT LONG-TERM USE: ICD-10-CM

## 2022-11-09 LAB — INR BLD: 2.2

## 2022-11-09 NOTE — PROGRESS NOTES
Anticoagulation Summary  As of 2022      INR goal:  2.0-3.0   TTR:  75.7 % (5.2 mo)   INR used for dosin.20 (2022)   Warfarin maintenance plan:  2.5 mg (5 mg x 0.5) every Mon, Thu; 5 mg (5 mg x 1) all other days; Starting 2022   Weekly warfarin total:  30 mg   Plan last modified:  Cedric Echavarria MD (10/21/2022)   Next INR check:  2022   Target end date:   Indefinite    Indications    Permanent atrial fibrillation (HCC) [I48.21]  Anticoagulant long-term use [Z79.01]                 Anticoagulation Episode Summary       INR check location:  Home Draw    Preferred lab:      Send INR reminders to:      Comments:            Anticoagulation Care Providers       Provider Role Specialty Phone number    Justin Campbell MD [facility] Internal Medicine [phone]

## 2022-11-16 LAB — INR BLD: 2.1

## 2022-11-17 ENCOUNTER — ANTI-COAG VISIT (OUTPATIENT)
Dept: INTERNAL MEDICINE CLINIC | Facility: CLINIC | Age: 87
End: 2022-11-17

## 2022-11-17 DIAGNOSIS — I48.21 PERMANENT ATRIAL FIBRILLATION (HCC): Primary | ICD-10-CM

## 2022-11-17 DIAGNOSIS — Z79.01 ANTICOAGULANT LONG-TERM USE: ICD-10-CM

## 2022-11-17 NOTE — PROGRESS NOTES
Anticoagulation Summary  As of 2022      INR goal:  2.0-3.0   TTR:  76.8 % (5.5 mo)   INR used for dosin.10 (2022)   Warfarin maintenance plan:  2.5 mg (5 mg x 0.5) every Mon, Thu; 5 mg (5 mg x 1) all other days; Starting 2022   Weekly warfarin total:  30 mg   Plan last modified:  Brooklynn Bruce MD (10/21/2022)   Next INR check:  2022   Target end date:   Indefinite    Indications    Permanent atrial fibrillation (HCC) [I48.21]  Anticoagulant long-term use [Z79.01]                 Anticoagulation Episode Summary       INR check location:  Home Draw    Preferred lab:      Send INR reminders to:      Comments:            Anticoagulation Care Providers       Provider Role Specialty Phone number    Adrien Ribeiro MD [facility] Internal Medicine [phone]

## 2022-11-22 ENCOUNTER — ANTI-COAG VISIT (OUTPATIENT)
Dept: INTERNAL MEDICINE CLINIC | Facility: CLINIC | Age: 87
End: 2022-11-22

## 2022-11-22 DIAGNOSIS — Z79.01 ANTICOAGULANT LONG-TERM USE: ICD-10-CM

## 2022-11-22 DIAGNOSIS — I48.21 PERMANENT ATRIAL FIBRILLATION (HCC): Primary | ICD-10-CM

## 2022-11-22 LAB — INR BLD: 2.3

## 2022-11-22 NOTE — PROGRESS NOTES
Anticoagulation Summary  As of 2022      INR goal:  2.0-3.0   TTR:  77.6 % (5.7 mo)   INR used for dosin.30 (2022)   Warfarin maintenance plan:  2.5 mg (5 mg x 0.5) every Mon, Thu; 5 mg (5 mg x 1) all other days; Starting 2022   Weekly warfarin total:  30 mg   Plan last modified:  Jacob Ellison MD (10/21/2022)   Next INR check:  2022   Target end date:   Indefinite    Indications    Permanent atrial fibrillation (HCC) [I48.21]  Anticoagulant long-term use [Z79.01]                 Anticoagulation Episode Summary       INR check location:  Home Draw    Preferred lab:      Send INR reminders to:      Comments:            Anticoagulation Care Providers       Provider Role Specialty Phone number    Mack Redd MD [facility] Internal Medicine [phone]

## 2022-11-30 LAB — INR BLD: 1.8

## 2022-12-01 ENCOUNTER — ANTI-COAG VISIT (OUTPATIENT)
Dept: INTERNAL MEDICINE CLINIC | Facility: CLINIC | Age: 87
End: 2022-12-01

## 2022-12-01 DIAGNOSIS — I48.21 PERMANENT ATRIAL FIBRILLATION (HCC): Primary | ICD-10-CM

## 2022-12-01 DIAGNOSIS — Z79.01 ANTICOAGULANT LONG-TERM USE: ICD-10-CM

## 2022-12-01 NOTE — PROGRESS NOTES
Anticoagulation Summary  As of 2022      INR goal:  2.0-3.0   TTR:  76.7 % (5.9 mo)   INR used for dosin.80 (2022)   Warfarin maintenance plan:  2.5 mg (5 mg x 0.5) every Mon, Thu; 5 mg (5 mg x 1) all other days; Starting 2022   Weekly warfarin total:  30 mg   Plan last modified:  Nelda Harman MD (10/21/2022)   Next INR check:  2022   Target end date: Indefinite    Indications    Permanent atrial fibrillation (HCC) [I48.21]  Anticoagulant long-term use [Z79.01]                 Anticoagulation Episode Summary       INR check location:  Home Draw    Preferred lab:      Send INR reminders to:      Comments:            Anticoagulation Care Providers       Provider Role Specialty Phone number    Nivia Carr MD [facility] Internal Medicine [phone]          Medication changes has been fully explained to the patient, who indicates understanding.

## 2022-12-07 LAB — INR BLD: 2

## 2022-12-08 ENCOUNTER — ANTI-COAG VISIT (OUTPATIENT)
Dept: INTERNAL MEDICINE CLINIC | Facility: CLINIC | Age: 87
End: 2022-12-08

## 2022-12-08 DIAGNOSIS — Z79.01 ANTICOAGULANT LONG-TERM USE: ICD-10-CM

## 2022-12-08 DIAGNOSIS — I48.21 PERMANENT ATRIAL FIBRILLATION (HCC): Primary | ICD-10-CM

## 2022-12-08 NOTE — PROGRESS NOTES
Anticoagulation Summary  As of 2022      INR goal:  2.0-3.0   TTR:  73.9 % (6.2 mo)   INR used for dosin.00 (2022)   Warfarin maintenance plan:  2.5 mg (5 mg x 0.5) every Mon; 5 mg (5 mg x 1) all other days; Starting 2022   Weekly warfarin total:  32.5 mg   Plan last modified:  Rashid Kapadia MD (2022)   Next INR check:  2022   Target end date:   Indefinite    Indications    Permanent atrial fibrillation (HCC) [I48.21]  Anticoagulant long-term use [Z79.01]                 Anticoagulation Episode Summary       INR check location:  Home Draw    Preferred lab:      Send INR reminders to:      Comments:            Anticoagulation Care Providers       Provider Role Specialty Phone number    Rashid Kapadia MD [facility] Internal Medicine [phone]

## 2022-12-14 ENCOUNTER — TELEPHONE (OUTPATIENT)
Dept: INTERNAL MEDICINE CLINIC | Facility: CLINIC | Age: 87
End: 2022-12-14

## 2022-12-14 ENCOUNTER — TELEPHONE (OUTPATIENT)
Dept: CARDIOLOGY CLINIC | Age: 87
End: 2022-12-14

## 2022-12-14 LAB — INR BLD: 1.8

## 2022-12-14 NOTE — TELEPHONE ENCOUNTER
----- Message from Mishel Brumfield sent at 12/14/2022  9:48 AM EST -----  Subject: Message to Provider    QUESTIONS  Information for Provider? Patient would like to have her upcoming   appointment for [facility] (12/27/2022 at [facility] ) be switched to a televisit   instead using [phone]. Patient call patient, patient stated she   doesn't have voicemail.   ---------------------------------------------------------------------------  --------------  [id number]  [phone]; Do not leave any message, patient will call back for answer  ---------------------------------------------------------------------------  --------------  SCRIPT ANSWERS  Relationship to Patient?  Self

## 2022-12-16 ENCOUNTER — ANTI-COAG VISIT (OUTPATIENT)
Dept: INTERNAL MEDICINE CLINIC | Facility: CLINIC | Age: 87
End: 2022-12-16

## 2022-12-16 DIAGNOSIS — I48.21 PERMANENT ATRIAL FIBRILLATION (HCC): Primary | ICD-10-CM

## 2022-12-16 DIAGNOSIS — Z79.01 ANTICOAGULANT LONG-TERM USE: ICD-10-CM

## 2022-12-16 NOTE — PROGRESS NOTES
Anticoagulation Summary  As of 2022      INR goal:  2.0-3.0   TTR:  71.2 % (6.4 mo)   INR used for dosin.80 (2022)   Warfarin maintenance plan:  2.5 mg (5 mg x 0.5) every Mon; 5 mg (5 mg x 1) all other days; Starting 2022   Weekly warfarin total:  32.5 mg   Plan last modified:  Benson Lanza MD (2022)   Next INR check:  2022   Target end date:   Indefinite    Indications    Permanent atrial fibrillation (HCC) [I48.21]  Anticoagulant long-term use [Z79.01]                 Anticoagulation Episode Summary       INR check location:  Home Draw    Preferred lab:      Send INR reminders to:      Comments:            Anticoagulation Care Providers       Provider Role Specialty Phone number    Benson Lanza MD [facility] Internal Medicine [phone]

## 2022-12-19 ENCOUNTER — TELEPHONE (OUTPATIENT)
Dept: INTERNAL MEDICINE CLINIC | Facility: CLINIC | Age: 87
End: 2022-12-19

## 2022-12-19 NOTE — TELEPHONE ENCOUNTER
Patient called and she has some question about how to take her warfarin. Please call her. She was confused about the dosage.

## 2022-12-21 LAB — INR BLD: 2.3

## 2022-12-23 ENCOUNTER — ANTI-COAG VISIT (OUTPATIENT)
Dept: INTERNAL MEDICINE CLINIC | Facility: CLINIC | Age: 87
End: 2022-12-23

## 2022-12-23 DIAGNOSIS — I48.21 PERMANENT ATRIAL FIBRILLATION (HCC): Primary | ICD-10-CM

## 2022-12-23 DIAGNOSIS — Z79.01 ANTICOAGULANT LONG-TERM USE: ICD-10-CM

## 2022-12-23 NOTE — PROGRESS NOTES
Anticoagulation Summary  As of 2022      INR goal:  2.0-3.0   TTR:  70.9 % (6.6 mo)   INR used for dosin.30 (2022)   Warfarin maintenance plan:  5 mg (5 mg x 1) every day; Starting 2022   Weekly warfarin total:  35 mg   Plan last modified:  Nivia Carr MD (2022)   Next INR check:  2022   Target end date:   Indefinite    Indications    Permanent atrial fibrillation (HCC) [I48.21]  Anticoagulant long-term use [Z79.01]                 Anticoagulation Episode Summary       INR check location:  Home Draw    Preferred lab:      Send INR reminders to:      Comments:            Anticoagulation Care Providers       Provider Role Specialty Phone number    Nivia Carr MD [facility] Internal Medicine [phone]

## 2022-12-27 ENCOUNTER — TELEMEDICINE (OUTPATIENT)
Dept: INTERNAL MEDICINE CLINIC | Facility: CLINIC | Age: 87
End: 2022-12-27
Payer: MEDICARE

## 2022-12-27 DIAGNOSIS — E03.9 ACQUIRED HYPOTHYROIDISM: Primary | ICD-10-CM

## 2022-12-27 DIAGNOSIS — I48.21 PERMANENT ATRIAL FIBRILLATION (HCC): ICD-10-CM

## 2022-12-27 DIAGNOSIS — N39.3 STRESS INCONTINENCE (FEMALE) (MALE): ICD-10-CM

## 2022-12-27 DIAGNOSIS — E03.9 HYPOTHYROIDISM, UNSPECIFIED TYPE: ICD-10-CM

## 2022-12-27 DIAGNOSIS — R53.1 WEAKNESS: ICD-10-CM

## 2022-12-27 PROCEDURE — 99442 PR PHYS/QHP TELEPHONE EVALUATION 11-20 MIN: CPT | Performed by: INTERNAL MEDICINE

## 2022-12-27 NOTE — PROGRESS NOTES
[address] (:  1935) is a Established patient, here for evaluation of the following: This patient states she is doing well overall except for generalized weakness. This is a telephone virtual visit. She is living alone but is checking her vital signs weekly blood pressure stable and pulse of 72 today she has chronic atrial fibrillation and denies worsening shortness of breath palpitations. She has had no falls but is using a cane and/or walker in the home          Assessment & Plan   Below is the assessment and plan developed based on review of pertinent history, physical exam, labs, studies, and medications. 1. Acquired hypothyroidism  2. Permanent atrial fibrillation (HCC)  3. Stress incontinence (female) (male)  4. Hypothyroidism, unspecified type  5. Weakness    No follow-ups on file. Subjective   HPI  Review of Systems       Objective   Patient-Reported Vitals  Patient-Reported Systolic (Top): 953 mmHg  Patient-Reported Diastolic (Bottom): 61 mmHg  Patient-Reported Pulse: 72       Physical Exam  [INSTRUCTIONS:  \"[x]\" Indicates a positive item  \"[]\" Indicates a negative item  -- DELETE ALL ITEMS NOT EXAMINED]    Constitutional: [x] Appears well-developed and well-nourished [x] No apparent distress      [] Abnormal -     Mental status: [x] Alert and awake  [x] Oriented to person/place/time [x] Able to follow commands    [] Abnormal -        Other pertinent observable physical exam findings:-         On this date 2022 I have spent 15 minutes reviewing previous notes, test results and face to face (virtual) with the patient discussing the diagnosis and importance of compliance with the treatment plan as well as documenting on the day of the visit. [facility],[address], was evaluated through a synchronous (real-time) audio-video encounter. The patient (or guardian if applicable) is aware that this is a billable service, which includes applicable co-pays.  This Virtual Visit was conducted with patient's (and/or legal guardian's) consent. The visit was conducted pursuant to the emergency declaration under the [address] [place] [address] authority and the [employer] Act. Patient identification was verified, and a caregiver was present when appropriate. The patient was located at Home: [address] [place] [address]. Provider was located at [facility] (Appt Dept): Jalen Serrano [address] [place] [address].         --Lola Méndez MD

## 2022-12-28 LAB — INR BLD: 2.6

## 2022-12-29 ENCOUNTER — ANTI-COAG VISIT (OUTPATIENT)
Dept: INTERNAL MEDICINE CLINIC | Facility: CLINIC | Age: 87
End: 2022-12-29

## 2022-12-29 DIAGNOSIS — Z79.01 ANTICOAGULANT LONG-TERM USE: ICD-10-CM

## 2022-12-29 DIAGNOSIS — I48.21 PERMANENT ATRIAL FIBRILLATION (HCC): Primary | ICD-10-CM

## 2022-12-29 NOTE — PROGRESS NOTES
Anticoagulation Summary  As of 2022      INR goal:  2.0-3.0   TTR:  71.9 % (6.9 mo)   INR used for dosin.60 (2022)   Warfarin maintenance plan:  5 mg (5 mg x 1) every day; Starting 2022   Weekly warfarin total:  35 mg   Plan last modified:  Shahriar Gannon MD (2022)   Next INR check:  2023   Target end date:   Indefinite    Indications    Permanent atrial fibrillation (HCC) [I48.21]  Anticoagulant long-term use [Z79.01]                 Anticoagulation Episode Summary       INR check location:  Home Draw    Preferred lab:      Send INR reminders to:      Comments:            Anticoagulation Care Providers       Provider Role Specialty Phone number    Shahriar Gannon MD [facility] Internal Medicine [phone]

## 2023-01-05 ENCOUNTER — ANTI-COAG VISIT (OUTPATIENT)
Dept: INTERNAL MEDICINE CLINIC | Facility: CLINIC | Age: 88
End: 2023-01-05

## 2023-01-05 DIAGNOSIS — I48.21 PERMANENT ATRIAL FIBRILLATION (HCC): Primary | ICD-10-CM

## 2023-01-05 DIAGNOSIS — Z79.01 ANTICOAGULANT LONG-TERM USE: ICD-10-CM

## 2023-01-05 LAB — INR BLD: 2.3

## 2023-01-05 NOTE — PROGRESS NOTES
Anticoagulation Summary  As of 2023      INR goal:  2.0-3.0   TTR:  72.9 % (7.1 mo)   INR used for dosin.30 (2023)   Warfarin maintenance plan:  5 mg (5 mg x 1) every day; Starting 2023   Weekly warfarin total:  35 mg   Plan last modified:  Meeta Boo MD (2022)   Next INR check:  2023   Target end date:   Indefinite    Indications    Permanent atrial fibrillation (HCC) [I48.21]  Anticoagulant long-term use [Z79.01]                 Anticoagulation Episode Summary       INR check location:  Home Draw    Preferred lab:      Send INR reminders to:      Comments:            Anticoagulation Care Providers       Provider Role Specialty Phone number    Meeta Boo MD [facility] Internal Medicine [phone]

## 2023-01-11 LAB — INR BLD: 2.3

## 2023-01-11 NOTE — TELEPHONE ENCOUNTER
MEDICATION REFILL REQUEST      Name of Medication:  Warfarin  Dose:  5 mg  Frequency:  1 a day  Quantity:  90  Days' supply:  90      Pharmacy Name/Location:   [facility] REFILL REQUEST      Name of Medication:  Digoxin  Dose:  125 mcg  Frequency:  1 a day  Quantity:  90  Days' supply:  90      Pharmacy Name/Location:  [facility] REFILL REQUEST      Name of Medication:  Diltiazem  Dose:  240 mg  Frequency:  1 a day  Quantity:  90  Days' supply:  90      Pharmacy Name/Location:  [facility]

## 2023-01-12 RX ORDER — WARFARIN SODIUM 5 MG/1
5 TABLET ORAL DAILY
Qty: 90 TABLET | Refills: 3 | Status: SHIPPED | OUTPATIENT
Start: 2023-01-12

## 2023-01-12 RX ORDER — DILTIAZEM HYDROCHLORIDE 240 MG/1
CAPSULE, COATED, EXTENDED RELEASE ORAL
Qty: 90 CAPSULE | Refills: 0 | Status: SHIPPED | OUTPATIENT
Start: 2023-01-12

## 2023-01-12 RX ORDER — LEVOTHYROXINE SODIUM 0.1 MG/1
100 TABLET ORAL
Qty: 90 TABLET | Refills: 3 | Status: SHIPPED | OUTPATIENT
Start: 2023-01-12

## 2023-01-12 RX ORDER — DIGOXIN 125 MCG
TABLET ORAL
Qty: 90 TABLET | Refills: 0 | Status: SHIPPED | OUTPATIENT
Start: 2023-01-12

## 2023-01-12 NOTE — TELEPHONE ENCOUNTER
Requested Prescriptions     Pending Prescriptions Disp Refills    digoxin (LANOXIN) 125 MCG tablet 90 tablet 0     Sig: Take 1 tab po daily except on Monday and Thursdays    dilTIAZem (CARDIZEM CD) 240 MG extended release capsule 90 capsule 0     Sig: TAKE 1 CAPSULE DAILY     Informed patient she will need to get Warfarin from her PCP. She voiced understanding.

## 2023-01-12 NOTE — TELEPHONE ENCOUNTER
Medication Refill Request      Name of Medication : Levothyroxine      Strength of Medication: 100 mg      Directions: once a day      30 day or 90 day supply: 90      Preferred Pharmacy: [place] [id number]          Medication Refill Request      Name of Medication : Warfarin      Strength of Medication: 5 mg      Directions: once daily      30 day or 90 day supply: 90      Preferred Pharmacy: [place] [id number]

## 2023-01-13 ENCOUNTER — ANTI-COAG VISIT (OUTPATIENT)
Dept: INTERNAL MEDICINE CLINIC | Facility: CLINIC | Age: 88
End: 2023-01-13

## 2023-01-13 DIAGNOSIS — Z79.01 ANTICOAGULANT LONG-TERM USE: ICD-10-CM

## 2023-01-13 DIAGNOSIS — I48.21 PERMANENT ATRIAL FIBRILLATION (HCC): Primary | ICD-10-CM

## 2023-01-13 NOTE — PROGRESS NOTES
Anticoagulation Summary  As of 2023      INR goal:  2.0-3.0   TTR:  73.7 % (7.3 mo)   INR used for dosin.30 (2023)   Warfarin maintenance plan:  5 mg (5 mg x 1) every day; Starting 2023   Weekly warfarin total:  35 mg   Plan last modified:  Shahriar Gannon MD (2022)   Next INR check:  2023   Target end date:   Indefinite    Indications    Permanent atrial fibrillation (HCC) [I48.21]  Anticoagulant long-term use [Z79.01]                 Anticoagulation Episode Summary       INR check location:  Home Draw    Preferred lab:      Send INR reminders to:      Comments:            Anticoagulation Care Providers       Provider Role Specialty Phone number    Shahriar Gannon MD [facility] Internal Medicine [phone]

## 2023-01-25 LAB — INR BLD: 1.7

## 2023-01-26 ENCOUNTER — ANTI-COAG VISIT (OUTPATIENT)
Dept: INTERNAL MEDICINE CLINIC | Facility: CLINIC | Age: 88
End: 2023-01-26

## 2023-01-26 ENCOUNTER — TELEPHONE (OUTPATIENT)
Dept: INTERNAL MEDICINE CLINIC | Facility: CLINIC | Age: 88
End: 2023-01-26

## 2023-01-26 DIAGNOSIS — I48.21 PERMANENT ATRIAL FIBRILLATION (HCC): Primary | ICD-10-CM

## 2023-01-26 DIAGNOSIS — Z79.01 ANTICOAGULANT LONG-TERM USE: ICD-10-CM

## 2023-01-26 NOTE — TELEPHONE ENCOUNTER
Patient is called about medication with her blood thinners, she wants to know what she needs to do she checked it yesterday and it was 1.7
Sent to CMS as an anticoag encounter
No

## 2023-01-26 NOTE — PROGRESS NOTES
Anticoagulation Summary  As of 2023      INR goal:  2.0-3.0   TTR:  72.2 % (7.8 mo)   INR used for dosin.70 (2023)   Warfarin maintenance plan:  5 mg (5 mg x 1) every day; Starting 2023   Weekly warfarin total:  35 mg   Plan last modified:  Bel Moss MD (2022)   Next INR check:  2023   Target end date:   Indefinite    Indications    Permanent atrial fibrillation (HCC) [I48.21]  Anticoagulant long-term use [Z79.01]                 Anticoagulation Episode Summary       INR check location:  Home Draw    Preferred lab:      Send INR reminders to:      Comments:            Anticoagulation Care Providers       Provider Role Specialty Phone number    Bel Moss MD [facility] Internal Medicine [phone]

## 2023-02-01 LAB — INR BLD: 2.2

## 2023-02-02 ENCOUNTER — ANTI-COAG VISIT (OUTPATIENT)
Dept: INTERNAL MEDICINE CLINIC | Facility: CLINIC | Age: 88
End: 2023-02-02

## 2023-02-02 NOTE — PROGRESS NOTES
Anticoagulation Episode Summary       Current INR goal:  2.0-3.0   TTR:  71.4 % (8 mo)   Next INR check:  2/8/2023   INR from last check:  2.20 (2/1/2023)   Weekly max warfarin dose:     Target end date: Indefinite   INR check location:  Home Draw   Preferred lab:     Send INR reminders to:       Indications    Permanent atrial fibrillation (HCC) [I48.21]  Anticoagulant long-term use [Z79.01]             Comments:               Anticoagulation Care Providers       Provider Role Specialty Phone number    Vanessa Mabry MD [facility] Internal Medicine [phone]

## 2023-02-08 LAB — INR BLD: 2.4

## 2023-02-10 ENCOUNTER — ANTI-COAG VISIT (OUTPATIENT)
Dept: INTERNAL MEDICINE CLINIC | Facility: CLINIC | Age: 88
End: 2023-02-10

## 2023-02-10 DIAGNOSIS — Z79.01 ANTICOAGULANT LONG-TERM USE: ICD-10-CM

## 2023-02-10 DIAGNOSIS — I48.21 PERMANENT ATRIAL FIBRILLATION (HCC): Primary | ICD-10-CM

## 2023-02-10 NOTE — PROGRESS NOTES
Anticoagulation Episode Summary       Current INR goal:  2.0-3.0   TTR:  72.2 % (8.3 mo)   Next INR check:  2/15/2023   INR from last check:  2.40 (2/8/2023)   Weekly max warfarin dose:     Target end date: Indefinite   INR check location:  Home Draw   Preferred lab:     Send INR reminders to:       Indications    Permanent atrial fibrillation (HCC) [I48.21]  Anticoagulant long-term use [Z79.01]             Comments:               Anticoagulation Care Providers       Provider Role Specialty Phone number    Ирина Rowley MD [facility] Internal Medicine [phone]

## 2023-02-15 LAB — INR BLD: 3.2

## 2023-02-16 ENCOUNTER — ANTI-COAG VISIT (OUTPATIENT)
Dept: INTERNAL MEDICINE CLINIC | Facility: CLINIC | Age: 88
End: 2023-02-16

## 2023-02-16 DIAGNOSIS — I48.21 PERMANENT ATRIAL FIBRILLATION (HCC): Primary | ICD-10-CM

## 2023-02-16 DIAGNOSIS — Z79.01 ANTICOAGULANT LONG-TERM USE: ICD-10-CM

## 2023-02-16 NOTE — PROGRESS NOTES
Anticoagulation Summary  As of 2/16/2023      INR goal:  2.0-3.0   TTR:  72.1 % (8.5 mo)   INR used for dosing:  3.20 (2/15/2023)   Warfarin maintenance plan:  7.5 mg (5 mg x 1.5) every Mon, Thu; 5 mg (5 mg x 1) all other days; Starting 2/16/2023   Weekly warfarin total:  40 mg   Plan last modified:  Nancy Salgado MD (1/26/2023)   Next INR check:     Target end date:   Indefinite    Indications    Permanent atrial fibrillation (HCC) [I48.21]  Anticoagulant long-term use [Z79.01]                 Anticoagulation Episode Summary       INR check location:  Home Draw    Preferred lab:      Send INR reminders to:      Comments:            Anticoagulation Care Providers       Provider Role Specialty Phone number    Nancy Salgado MD [facility] Internal Medicine [phone]

## 2023-02-21 DIAGNOSIS — Z95.0 PRESENCE OF CARDIAC PACEMAKER: ICD-10-CM

## 2023-02-21 DIAGNOSIS — I49.5 SICK SINUS SYNDROME (HCC): ICD-10-CM

## 2023-02-21 DIAGNOSIS — I48.21 PERMANENT ATRIAL FIBRILLATION (HCC): ICD-10-CM

## 2023-02-22 LAB — INR BLD: 3.2

## 2023-02-23 ENCOUNTER — ANTI-COAG VISIT (OUTPATIENT)
Dept: INTERNAL MEDICINE CLINIC | Facility: CLINIC | Age: 88
End: 2023-02-23

## 2023-02-23 DIAGNOSIS — Z79.01 ANTICOAGULANT LONG-TERM USE: ICD-10-CM

## 2023-02-23 DIAGNOSIS — I48.21 PERMANENT ATRIAL FIBRILLATION (HCC): Primary | ICD-10-CM

## 2023-02-23 NOTE — PROGRESS NOTES
Anticoagulation Summary  As of 2/23/2023      INR goal:  2.0-3.0   TTR:  70.2 % (8.7 mo)   INR used for dosing:  3.20 (2/22/2023)   Warfarin maintenance plan:  7.5 mg (5 mg x 1.5) every Thu; 5 mg (5 mg x 1) all other days; Starting 2/23/2023   Weekly warfarin total:  37.5 mg   Plan last modified:  Melissa Roberts MD (2/16/2023)   Next INR check:  3/1/2023   Target end date:   Indefinite    Indications    Permanent atrial fibrillation (HCC) [I48.21]  Anticoagulant long-term use [Z79.01]                 Anticoagulation Episode Summary       INR check location:  Home Draw    Preferred lab:      Send INR reminders to:      Comments:            Anticoagulation Care Providers       Provider Role Specialty Phone number    Melissa Roberts MD [facility] Internal Medicine [phone]

## 2023-03-01 LAB — INR BLD: 2.7

## 2023-03-02 ENCOUNTER — ANTI-COAG VISIT (OUTPATIENT)
Dept: INTERNAL MEDICINE CLINIC | Facility: CLINIC | Age: 88
End: 2023-03-02

## 2023-03-02 DIAGNOSIS — Z79.01 ANTICOAGULANT LONG-TERM USE: ICD-10-CM

## 2023-03-02 DIAGNOSIS — I48.21 PERMANENT ATRIAL FIBRILLATION (HCC): Primary | ICD-10-CM

## 2023-03-02 NOTE — PROGRESS NOTES
Anticoagulation Summary  As of 3/2/2023      INR goal:  2.0-3.0   TTR:  70.0 % (9 mo)   INR used for dosin.70 (3/1/2023)   Warfarin maintenance plan:  5 mg (5 mg x 1) every day; Starting 3/2/2023   Weekly warfarin total:  35 mg   Plan last modified:  Eliceo Lopez MD (2023)   Next INR check:  3/8/2023   Target end date:   Indefinite    Indications    Permanent atrial fibrillation (HCC) [I48.21]  Anticoagulant long-term use [Z79.01]                 Anticoagulation Episode Summary       INR check location:  Home Draw    Preferred lab:      Send INR reminders to:      Comments:            Anticoagulation Care Providers       Provider Role Specialty Phone number    Eliceo Lopez MD [facility] Internal Medicine [phone]

## 2023-03-08 LAB — INR BLD: 2.5

## 2023-03-10 ENCOUNTER — ANTI-COAG VISIT (OUTPATIENT)
Dept: INTERNAL MEDICINE CLINIC | Facility: CLINIC | Age: 88
End: 2023-03-10

## 2023-03-10 DIAGNOSIS — Z79.01 ANTICOAGULANT LONG-TERM USE: ICD-10-CM

## 2023-03-10 DIAGNOSIS — I48.21 PERMANENT ATRIAL FIBRILLATION (HCC): Primary | ICD-10-CM

## 2023-03-10 NOTE — PROGRESS NOTES
Anticoagulation Summary  As of 3/10/2023      INR goal:  2.0-3.0   TTR:  70.8 % (9.2 mo)   INR used for dosin.50 (3/8/2023)   Warfarin maintenance plan:  5 mg (5 mg x 1) every day; Starting 3/10/2023   Weekly warfarin total:  35 mg   Plan last modified:  Christa Geronimo MD (2023)   Next INR check:  3/15/2023   Target end date:   Indefinite    Indications    Permanent atrial fibrillation (HCC) [I48.21]  Anticoagulant long-term use [Z79.01]                 Anticoagulation Episode Summary       INR check location:  Home Draw    Preferred lab:      Send INR reminders to:      Comments:            Anticoagulation Care Providers       Provider Role Specialty Phone number    Christa Geronimo MD [facility] Internal Medicine [phone]

## 2023-03-15 LAB — INR BLD: 2.8

## 2023-03-16 ENCOUNTER — ANTI-COAG VISIT (OUTPATIENT)
Dept: INTERNAL MEDICINE CLINIC | Facility: CLINIC | Age: 88
End: 2023-03-16

## 2023-03-16 DIAGNOSIS — Z79.01 ANTICOAGULANT LONG-TERM USE: ICD-10-CM

## 2023-03-16 DIAGNOSIS — I48.21 PERMANENT ATRIAL FIBRILLATION (HCC): Primary | ICD-10-CM

## 2023-03-16 NOTE — PROGRESS NOTES
Anticoagulation Summary  As of 3/16/2023      INR goal:  2.0-3.0   TTR:  71.5 % (9.4 mo)   INR used for dosin.80 (3/15/2023)   Warfarin maintenance plan:  5 mg (5 mg x 1) every day; Starting 3/16/2023   Weekly warfarin total:  35 mg   Plan last modified:  Alex Hughes MD (2023)   Next INR check:  3/22/2023   Target end date:   Indefinite    Indications    Permanent atrial fibrillation (HCC) [I48.21]  Anticoagulant long-term use [Z79.01]                 Anticoagulation Episode Summary       INR check location:  Home Draw    Preferred lab:      Send INR reminders to:      Comments:            Anticoagulation Care Providers       Provider Role Specialty Phone number    Alex Hughes MD [facility] Internal Medicine [phone]

## 2023-03-22 LAB — INR BLD: 2.2

## 2023-03-23 ENCOUNTER — ANTI-COAG VISIT (OUTPATIENT)
Dept: INTERNAL MEDICINE CLINIC | Facility: CLINIC | Age: 88
End: 2023-03-23

## 2023-03-23 DIAGNOSIS — Z79.01 ANTICOAGULANT LONG-TERM USE: ICD-10-CM

## 2023-03-23 DIAGNOSIS — I48.21 PERMANENT ATRIAL FIBRILLATION (HCC): Primary | ICD-10-CM

## 2023-03-23 NOTE — PROGRESS NOTES
Anticoagulation Summary  As of 3/23/2023      INR goal:  2.0-3.0   TTR:  72.2 % (9.7 mo)   INR used for dosin.20 (3/22/2023)   Warfarin maintenance plan:  5 mg (5 mg x 1) every day; Starting 3/23/2023   Weekly warfarin total:  35 mg   Plan last modified:  Zelda Campos MD (2023)   Next INR check:  3/29/2023   Target end date:   Indefinite    Indications    Permanent atrial fibrillation (HCC) [I48.21]  Anticoagulant long-term use [Z79.01]                 Anticoagulation Episode Summary       INR check location:  Home Draw    Preferred lab:      Send INR reminders to:      Comments:            Anticoagulation Care Providers       Provider Role Specialty Phone number    Zelda Campos MD [facility] Internal Medicine [phone]

## 2023-03-27 ENCOUNTER — TELEPHONE (OUTPATIENT)
Dept: INTERNAL MEDICINE CLINIC | Facility: CLINIC | Age: 88
End: 2023-03-27

## 2023-03-29 LAB — INR BLD: 2.4

## 2023-03-29 RX ORDER — DILTIAZEM HYDROCHLORIDE 240 MG/1
CAPSULE, COATED, EXTENDED RELEASE ORAL
Qty: 90 CAPSULE | Refills: 0 | Status: SHIPPED | OUTPATIENT
Start: 2023-03-29

## 2023-03-29 RX ORDER — DIGOXIN 125 MCG
TABLET ORAL
Qty: 65 TABLET | Refills: 0 | Status: SHIPPED | OUTPATIENT
Start: 2023-03-29

## 2023-03-30 ENCOUNTER — ANTI-COAG VISIT (OUTPATIENT)
Dept: INTERNAL MEDICINE CLINIC | Facility: CLINIC | Age: 88
End: 2023-03-30

## 2023-03-30 DIAGNOSIS — Z79.01 ANTICOAGULANT LONG-TERM USE: ICD-10-CM

## 2023-03-30 DIAGNOSIS — I48.21 PERMANENT ATRIAL FIBRILLATION (HCC): Primary | ICD-10-CM

## 2023-03-30 NOTE — PROGRESS NOTES
Anticoagulation Summary  As of 3/30/2023      INR goal:  2.0-3.0   TTR:  72.8 % (9.9 mo)   INR used for dosin.40 (3/29/2023)   Warfarin maintenance plan:  5 mg (5 mg x 1) every day; Starting 3/30/2023   Weekly warfarin total:  35 mg   Plan last modified:  Dany Rocha MD (2023)   Next INR check:  2023   Target end date:   Indefinite    Indications    Permanent atrial fibrillation (HCC) [I48.21]  Anticoagulant long-term use [Z79.01]                 Anticoagulation Episode Summary       INR check location:  Home Draw    Preferred lab:      Send INR reminders to:      Comments:            Anticoagulation Care Providers       Provider Role Specialty Phone number    Dany Rocha MD [facility] Internal Medicine [phone]

## 2023-04-05 LAB — INR BLD: 2.1

## 2023-04-06 ENCOUNTER — ANTI-COAG VISIT (OUTPATIENT)
Dept: INTERNAL MEDICINE CLINIC | Facility: CLINIC | Age: 88
End: 2023-04-06

## 2023-04-06 DIAGNOSIS — Z79.01 ANTICOAGULANT LONG-TERM USE: ICD-10-CM

## 2023-04-06 DIAGNOSIS — I48.21 PERMANENT ATRIAL FIBRILLATION (HCC): Primary | ICD-10-CM

## 2023-04-06 NOTE — PROGRESS NOTES
Anticoagulation Summary  As of 2023      INR goal:  2.0-3.0   TTR:  73.4 % (10.1 mo)   INR used for dosin.10 (2023)   Warfarin maintenance plan:  5 mg (5 mg x 1) every day   Weekly warfarin total:  35 mg   Plan last modified:  Kareen Washington MD (2023)   Next INR check:  2023   Target end date:   Indefinite    Indications    Permanent atrial fibrillation (HCC) [I48.21]  Anticoagulant long-term use [Z79.01]                 Anticoagulation Episode Summary       INR check location:  Home Draw    Preferred lab:      Send INR reminders to:      Comments:            Anticoagulation Care Providers       Provider Role Specialty Phone number    Kareen Washington MD [facility] Internal Medicine [phone]

## 2023-04-07 ENCOUNTER — OFFICE VISIT (OUTPATIENT)
Dept: CARDIOLOGY CLINIC | Age: 88
End: 2023-04-07
Payer: MEDICARE

## 2023-04-07 VITALS
HEART RATE: 75 BPM | BODY MASS INDEX: 22.66 KG/M2 | DIASTOLIC BLOOD PRESSURE: 64 MMHG | SYSTOLIC BLOOD PRESSURE: 136 MMHG | HEIGHT: 69 IN | WEIGHT: 153 LBS

## 2023-04-07 DIAGNOSIS — I48.21 PERMANENT ATRIAL FIBRILLATION (HCC): Primary | ICD-10-CM

## 2023-04-07 DIAGNOSIS — I49.5 SICK SINUS SYNDROME (HCC): ICD-10-CM

## 2023-04-07 DIAGNOSIS — Z98.890 S/P MITRAL VALVE REPAIR: ICD-10-CM

## 2023-04-07 PROCEDURE — G8428 CUR MEDS NOT DOCUMENT: HCPCS | Performed by: INTERNAL MEDICINE

## 2023-04-07 PROCEDURE — 1090F PRES/ABSN URINE INCON ASSESS: CPT | Performed by: INTERNAL MEDICINE

## 2023-04-07 PROCEDURE — G8420 CALC BMI NORM PARAMETERS: HCPCS | Performed by: INTERNAL MEDICINE

## 2023-04-07 PROCEDURE — 4004F PT TOBACCO SCREEN RCVD TLK: CPT | Performed by: INTERNAL MEDICINE

## 2023-04-07 PROCEDURE — G8400 PT W/DXA NO RESULTS DOC: HCPCS | Performed by: INTERNAL MEDICINE

## 2023-04-07 PROCEDURE — 1123F ACP DISCUSS/DSCN MKR DOCD: CPT | Performed by: INTERNAL MEDICINE

## 2023-04-07 PROCEDURE — 93000 ELECTROCARDIOGRAM COMPLETE: CPT | Performed by: INTERNAL MEDICINE

## 2023-04-07 PROCEDURE — 99214 OFFICE O/P EST MOD 30 MIN: CPT | Performed by: INTERNAL MEDICINE

## 2023-04-07 RX ORDER — DILTIAZEM HYDROCHLORIDE 240 MG/1
CAPSULE, COATED, EXTENDED RELEASE ORAL
Qty: 90 CAPSULE | Refills: 0 | Status: SHIPPED | OUTPATIENT
Start: 2023-04-07

## 2023-04-07 RX ORDER — DIGOXIN 125 MCG
TABLET ORAL
Qty: 65 TABLET | Refills: 3 | Status: SHIPPED | OUTPATIENT
Start: 2023-04-07

## 2023-04-07 NOTE — PROGRESS NOTES
[facility] CARDIOLOGY  [address] [facility], [address]  PHONE: [phone]      23    NAME:  Marty Sidhu  : 1935  MRN: [id number]       SUBJECTIVE:   Marty Sidhu is a 82 y.o. female seen for a follow up visit regarding the following:     Chief Complaint   Patient presents with    Irregular Heart Beat         HPI:    No cp or myers. No orthopnea or pnd. No palpitations or syncope. Past Medical History, Past Surgical History, Family history, Social History, and Medications were all reviewed with the patient today and updated as necessary. Current Outpatient Medications   Medication Sig Dispense Refill    dilTIAZem (CARDIZEM CD) 240 MG extended release capsule TAKE 1 CAPSULE EVERY DAY 90 capsule 0    digoxin (LANOXIN) 125 MCG tablet TAKE 1 TABLET DAILY EXCEPT ON MONDAY AND  65 tablet 3    levothyroxine (SYNTHROID) 100 MCG tablet Take 1 tablet by mouth every morning (before breakfast) 90 tablet 3    warfarin (COUMADIN) 5 MG tablet Take 1 tablet by mouth daily 90 tablet 3    warfarin (COUMADIN) 5 MG tablet Take 1 tablet by mouth daily 90 tablet 3    diphenhydrAMINE (SOMINEX) 25 MG tablet Take 1 tablet by mouth nightly as needed for Sleep      polyethylene glycol (GLYCOLAX) 17 GM/SCOOP powder Take 17 g by mouth daily      sodium chloride (MARAL 128) 2 % ophthalmic solution Apply 1 drop to eye 2 times daily       No current facility-administered medications for this visit. Social History     Tobacco Use    Smoking status: Never    Smokeless tobacco: Never   Substance Use Topics    Alcohol use: No              PHYSICAL EXAM:   /64   Pulse 75   Ht 5' 9\" (1.753 m)   Wt 153 lb (69.4 kg)   BMI 22.59 kg/m²    Constitutional: Oriented to person, place, and time. Appears well-developed and well-nourished. Head: Normocephalic and atraumatic. Neck: Neck supple.    Cardiovascular: Normal rate and regular rhythm with no murmur -No

## 2023-04-19 LAB — INR BLD: 2

## 2023-04-20 ENCOUNTER — ANTI-COAG VISIT (OUTPATIENT)
Dept: INTERNAL MEDICINE CLINIC | Facility: CLINIC | Age: 88
End: 2023-04-20

## 2023-04-20 DIAGNOSIS — Z79.01 ANTICOAGULANT LONG-TERM USE: ICD-10-CM

## 2023-04-20 DIAGNOSIS — I48.21 PERMANENT ATRIAL FIBRILLATION (HCC): Primary | ICD-10-CM

## 2023-04-20 NOTE — PROGRESS NOTES
Anticoagulation Summary  As of 2023      INR goal:  2.0-3.0   TTR:  71.3 % (10.6 mo)   INR used for dosin.00 (2023)   Warfarin maintenance plan:  5 mg (5 mg x 1) every day   Weekly warfarin total:  35 mg   Plan last modified:  Lizz Lindsey MD (2023)   Next INR check:  2023   Target end date:   Indefinite    Indications    Permanent atrial fibrillation (HCC) [I48.21]  Anticoagulant long-term use [Z79.01]                 Anticoagulation Episode Summary       INR check location:  Home Draw    Preferred lab:      Send INR reminders to:      Comments:            Anticoagulation Care Providers       Provider Role Specialty Phone number    Lizz Lindsey MD [facility] Internal Medicine [phone]

## 2023-04-25 DIAGNOSIS — I48.21 PERMANENT ATRIAL FIBRILLATION (HCC): ICD-10-CM

## 2023-04-25 DIAGNOSIS — I49.5 SICK SINUS SYNDROME (HCC): ICD-10-CM

## 2023-04-25 DIAGNOSIS — Z95.0 PRESENCE OF CARDIAC PACEMAKER: ICD-10-CM

## 2023-04-26 LAB — INR BLD: 1.8

## 2023-04-27 ENCOUNTER — ANTI-COAG VISIT (OUTPATIENT)
Dept: INTERNAL MEDICINE CLINIC | Facility: CLINIC | Age: 88
End: 2023-04-27

## 2023-04-27 DIAGNOSIS — I48.21 PERMANENT ATRIAL FIBRILLATION (HCC): Primary | ICD-10-CM

## 2023-04-27 DIAGNOSIS — Z79.01 ANTICOAGULANT LONG-TERM USE: ICD-10-CM

## 2023-04-27 NOTE — PROGRESS NOTES
Anticoagulation Summary  As of 2023      INR goal:  2.0-3.0   TTR:  69.8 % (10.8 mo)   INR used for dosin.80 (2023)   Warfarin maintenance plan:  5 mg (5 mg x 1) every day   Weekly warfarin total:  35 mg   Plan last modified:  Wesley Schultz MD (2023)   Next INR check:  5/3/2023   Target end date:   Indefinite    Indications    Permanent atrial fibrillation (HCC) [I48.21]  Anticoagulant long-term use [Z79.01]                 Anticoagulation Episode Summary       INR check location:  Home Draw    Preferred lab:      Send INR reminders to:      Comments:            Anticoagulation Care Providers       Provider Role Specialty Phone number    Wesley Schultz MD [facility] Internal Medicine [phone]

## 2023-05-03 LAB — INR BLD: 2.2

## 2023-05-04 ENCOUNTER — ANTI-COAG VISIT (OUTPATIENT)
Dept: INTERNAL MEDICINE CLINIC | Facility: CLINIC | Age: 88
End: 2023-05-04

## 2023-05-04 DIAGNOSIS — Z79.01 ANTICOAGULANT LONG-TERM USE: ICD-10-CM

## 2023-05-04 DIAGNOSIS — I48.21 PERMANENT ATRIAL FIBRILLATION (HCC): Primary | ICD-10-CM

## 2023-05-04 NOTE — PROGRESS NOTES
Anticoagulation Summary  As of 2023      INR goal:  2.0-3.0   TTR:  69.4 % (11.1 mo)   INR used for dosin.20 (5/3/2023)   Warfarin maintenance plan:  7.5 mg (5 mg x 1.5) every Thu; 5 mg (5 mg x 1) all other days   Weekly warfarin total:  37.5 mg   Plan last modified:  Chula Driscoll MD (2023)   Next INR check:  5/10/2023   Target end date:   Indefinite    Indications    Permanent atrial fibrillation (HCC) [I48.21]  Anticoagulant long-term use [Z79.01]                 Anticoagulation Episode Summary       INR check location:  Home Draw    Preferred lab:      Send INR reminders to:      Comments:            Anticoagulation Care Providers       Provider Role Specialty Phone number    Chula Driscoll MD [facility] Internal Medicine [phone]

## 2023-05-10 LAB — INR BLD: 2.2

## 2023-05-11 ENCOUNTER — ANTI-COAG VISIT (OUTPATIENT)
Dept: INTERNAL MEDICINE CLINIC | Facility: CLINIC | Age: 88
End: 2023-05-11

## 2023-05-11 DIAGNOSIS — I48.21 PERMANENT ATRIAL FIBRILLATION (HCC): Primary | ICD-10-CM

## 2023-05-11 DIAGNOSIS — Z79.01 ANTICOAGULANT LONG-TERM USE: ICD-10-CM

## 2023-05-11 NOTE — PROGRESS NOTES
Anticoagulation Summary  As of 2023      INR goal:  2.0-3.0   TTR:  70.1 % (11.3 mo)   INR used for dosin.20 (5/10/2023)   Warfarin maintenance plan:  7.5 mg (5 mg x 1.5) every Thu; 5 mg (5 mg x 1) all other days   Weekly warfarin total:  37.5 mg   Plan last modified:  Luna Wilson MD (2023)   Next INR check:  2023   Target end date:   Indefinite    Indications    Permanent atrial fibrillation (HCC) [I48.21]  Anticoagulant long-term use [Z79.01]                 Anticoagulation Episode Summary       INR check location:  Home Draw    Preferred lab:      Send INR reminders to:      Comments:            Anticoagulation Care Providers       Provider Role Specialty Phone number    Luna Wilson MD [facility] Internal Medicine [phone]

## 2023-05-17 ENCOUNTER — ANTI-COAG VISIT (OUTPATIENT)
Dept: INTERNAL MEDICINE CLINIC | Facility: CLINIC | Age: 88
End: 2023-05-17

## 2023-05-17 DIAGNOSIS — I48.21 PERMANENT ATRIAL FIBRILLATION (HCC): Primary | ICD-10-CM

## 2023-05-17 DIAGNOSIS — Z79.01 ANTICOAGULANT LONG-TERM USE: ICD-10-CM

## 2023-05-17 LAB — INR BLD: 2.2

## 2023-05-17 NOTE — PROGRESS NOTES
Anticoagulation Summary  As of 2023      INR goal:  2.0-3.0   TTR:  70.7 % (11.5 mo)   INR used for dosin.20 (2023)   Warfarin maintenance plan:  7.5 mg (5 mg x 1.5) every Thu; 5 mg (5 mg x 1) all other days   Weekly warfarin total:  37.5 mg   Plan last modified:  Wesley Schultz MD (2023)   Next INR check:  2023   Target end date:   Indefinite    Indications    Permanent atrial fibrillation (HCC) [I48.21]  Anticoagulant long-term use [Z79.01]                 Anticoagulation Episode Summary       INR check location:  Home Draw    Preferred lab:      Send INR reminders to:      Comments:            Anticoagulation Care Providers       Provider Role Specialty Phone number    Wesley Schultz MD [facility] Internal Medicine [phone]

## 2023-05-19 ENCOUNTER — TELEPHONE (OUTPATIENT)
Age: 88
End: 2023-05-19

## 2023-05-23 DIAGNOSIS — Z95.0 PRESENCE OF CARDIAC PACEMAKER: Primary | ICD-10-CM

## 2023-05-23 DIAGNOSIS — I48.21 PERMANENT ATRIAL FIBRILLATION (HCC): ICD-10-CM

## 2023-05-23 DIAGNOSIS — I49.5 SICK SINUS SYNDROME (HCC): ICD-10-CM

## 2023-05-24 ENCOUNTER — ANTI-COAG VISIT (OUTPATIENT)
Dept: INTERNAL MEDICINE CLINIC | Facility: CLINIC | Age: 88
End: 2023-05-24

## 2023-05-24 DIAGNOSIS — Z79.01 ANTICOAGULANT LONG-TERM USE: ICD-10-CM

## 2023-05-24 DIAGNOSIS — I48.21 PERMANENT ATRIAL FIBRILLATION (HCC): Primary | ICD-10-CM

## 2023-05-24 LAB — INR BLD: 2.5

## 2023-05-24 NOTE — PROGRESS NOTES
Anticoagulation Summary  As of 2023      INR goal:  2.0-3.0   TTR:  71.3 % (11.8 mo)   INR used for dosin.50 (2023)   Warfarin maintenance plan:  7.5 mg (5 mg x 1.5) every Thu; 5 mg (5 mg x 1) all other days   Weekly warfarin total:  37.5 mg   Plan last modified:  Elnora Hammans, MD (2023)   Next INR check:  2023   Target end date:   Indefinite    Indications    Permanent atrial fibrillation (HCC) [I48.21]  Anticoagulant long-term use [Z79.01]                 Anticoagulation Episode Summary       INR check location:  Home Draw    Preferred lab:      Send INR reminders to:      Comments:            Anticoagulation Care Providers       Provider Role Specialty Phone number    Elnora Hammans, MD [facility] Internal Medicine [phone]

## 2023-05-31 ENCOUNTER — ANTI-COAG VISIT (OUTPATIENT)
Dept: INTERNAL MEDICINE CLINIC | Facility: CLINIC | Age: 88
End: 2023-05-31

## 2023-05-31 DIAGNOSIS — Z79.01 ANTICOAGULANT LONG-TERM USE: ICD-10-CM

## 2023-05-31 DIAGNOSIS — I48.21 PERMANENT ATRIAL FIBRILLATION (HCC): Primary | ICD-10-CM

## 2023-05-31 LAB — INR BLD: 2.7

## 2023-05-31 NOTE — PROGRESS NOTES
Anticoagulation Summary  As of 2023      INR goal:  2.0-3.0   TTR:  71.9 % (1 y)   INR used for dosin.70 (2023)   Warfarin maintenance plan:  7.5 mg (5 mg x 1.5) every Thu; 5 mg (5 mg x 1) all other days   Weekly warfarin total:  37.5 mg   Plan last modified:  Mesha Simon MD (2023)   Next INR check:  2023   Target end date:   Indefinite    Indications    Permanent atrial fibrillation (HCC) [I48.21]  Anticoagulant long-term use [Z79.01]                 Anticoagulation Episode Summary       INR check location:  Home Draw    Preferred lab:      Send INR reminders to:      Comments:            Anticoagulation Care Providers       Provider Role Specialty Phone number    Mesha Simon MD [facility] Internal Medicine [phone]

## 2023-06-07 ENCOUNTER — ANTI-COAG VISIT (OUTPATIENT)
Dept: INTERNAL MEDICINE CLINIC | Facility: CLINIC | Age: 88
End: 2023-06-07

## 2023-06-07 DIAGNOSIS — I48.21 PERMANENT ATRIAL FIBRILLATION (HCC): Primary | ICD-10-CM

## 2023-06-07 DIAGNOSIS — Z79.01 ANTICOAGULANT LONG-TERM USE: ICD-10-CM

## 2023-06-07 LAB — INR BLD: 3

## 2023-06-07 NOTE — PROGRESS NOTES
Anticoagulation Summary  As of 6/7/2023      INR goal:  2.0-3.0   TTR:  72.4 % (1 y)   INR used for dosing:  3.00 (6/7/2023)   Warfarin maintenance plan:  7.5 mg (5 mg x 1.5) every Thu; 5 mg (5 mg x 1) all other days   Weekly warfarin total:  37.5 mg   Plan last modified:  Lanny Kawasaki, MD (4/27/2023)   Next INR check:  6/14/2023   Target end date:   Indefinite    Indications    Permanent atrial fibrillation (HCC) [I48.21]  Anticoagulant long-term use [Z79.01]                 Anticoagulation Episode Summary       INR check location:  Home Draw    Preferred lab:      Send INR reminders to:      Comments:            Anticoagulation Care Providers       Provider Role Specialty Phone number    Lanny Kawasaki, MD [facility] Internal Medicine [phone]

## 2023-06-21 ENCOUNTER — ANTI-COAG VISIT (OUTPATIENT)
Dept: INTERNAL MEDICINE CLINIC | Facility: CLINIC | Age: 88
End: 2023-06-21

## 2023-06-21 DIAGNOSIS — I48.21 PERMANENT ATRIAL FIBRILLATION (HCC): Primary | ICD-10-CM

## 2023-06-21 DIAGNOSIS — Z79.01 ANTICOAGULANT LONG-TERM USE: ICD-10-CM

## 2023-06-21 LAB — INR BLD: 2.5

## 2023-06-21 NOTE — PROGRESS NOTES
Anticoagulation Summary  As of 2023      INR goal:  2.0-3.0   TTR:  73.4 % (1 y)   INR used for dosin.50 (2023)   Warfarin maintenance plan:  7.5 mg (5 mg x 1.5) every Thu; 5 mg (5 mg x 1) all other days   Weekly warfarin total:  37.5 mg   Plan last modified:  Renée Alaniz MD (2023)   Next INR check:  2023   Target end date:   Indefinite    Indications    Permanent atrial fibrillation (HCC) [I48.21]  Anticoagulant long-term use [Z79.01]                 Anticoagulation Episode Summary       INR check location:  Home Draw    Preferred lab:      Send INR reminders to:      Comments:            Anticoagulation Care Providers       Provider Role Specialty Phone number    Renée Alaniz MD [facility] Internal Medicine [phone]

## 2023-06-28 ENCOUNTER — ANTI-COAG VISIT (OUTPATIENT)
Dept: INTERNAL MEDICINE CLINIC | Facility: CLINIC | Age: 88
End: 2023-06-28

## 2023-06-28 DIAGNOSIS — Z79.01 ANTICOAGULANT LONG-TERM USE: ICD-10-CM

## 2023-06-28 DIAGNOSIS — I48.21 PERMANENT ATRIAL FIBRILLATION (HCC): Primary | ICD-10-CM

## 2023-06-28 LAB — INR BLD: 2.8

## 2023-06-29 ASSESSMENT — LIFESTYLE VARIABLES
HOW OFTEN DO YOU HAVE A DRINK CONTAINING ALCOHOL: NEVER
HOW MANY STANDARD DRINKS CONTAINING ALCOHOL DO YOU HAVE ON A TYPICAL DAY: PATIENT DOES NOT DRINK

## 2023-06-29 ASSESSMENT — PATIENT HEALTH QUESTIONNAIRE - PHQ9
SUM OF ALL RESPONSES TO PHQ9 QUESTIONS 1 & 2: 1
SUM OF ALL RESPONSES TO PHQ QUESTIONS 1-9: 1
SUM OF ALL RESPONSES TO PHQ QUESTIONS 1-9: 1
2. FEELING DOWN, DEPRESSED OR HOPELESS: 1
SUM OF ALL RESPONSES TO PHQ QUESTIONS 1-9: 1
1. LITTLE INTEREST OR PLEASURE IN DOING THINGS: 0
SUM OF ALL RESPONSES TO PHQ QUESTIONS 1-9: 1

## 2023-07-05 ENCOUNTER — ANTI-COAG VISIT (OUTPATIENT)
Dept: INTERNAL MEDICINE CLINIC | Facility: CLINIC | Age: 88
End: 2023-07-05

## 2023-07-05 DIAGNOSIS — Z79.01 ANTICOAGULANT LONG-TERM USE: ICD-10-CM

## 2023-07-05 DIAGNOSIS — I48.21 PERMANENT ATRIAL FIBRILLATION (HCC): Primary | ICD-10-CM

## 2023-07-05 LAB — INR BLD: 2.8

## 2023-07-05 NOTE — PROGRESS NOTES
Anticoagulation Summary  As of 2023      INR goal:  2.0-3.0   TTR:  74.4 % (1.1 y)   INR used for dosin.80 (2023)   Warfarin maintenance plan:  7.5 mg (5 mg x 1.5) every Thu; 5 mg (5 mg x 1) all other days   Weekly warfarin total:  37.5 mg   Plan last modified:  Erasmo Jha MD (2023)   Next INR check:  2023   Target end date:   Indefinite    Indications    Permanent atrial fibrillation (HCC) [I48.21]  Anticoagulant long-term use [Z79.01]                 Anticoagulation Episode Summary       INR check location:  Home Draw    Preferred lab:      Send INR reminders to:      Comments:            Anticoagulation Care Providers       Provider Role Specialty Phone number    Erasmo Jha MD [facility] Internal Medicine [phone]

## 2023-07-06 ENCOUNTER — OFFICE VISIT (OUTPATIENT)
Dept: INTERNAL MEDICINE CLINIC | Facility: CLINIC | Age: 88
End: 2023-07-06
Payer: MEDICARE

## 2023-07-06 VITALS
DIASTOLIC BLOOD PRESSURE: 59 MMHG | RESPIRATION RATE: 16 BRPM | TEMPERATURE: 97.4 F | HEART RATE: 71 BPM | SYSTOLIC BLOOD PRESSURE: 126 MMHG | HEIGHT: 69 IN | WEIGHT: 155.2 LBS | BODY MASS INDEX: 22.99 KG/M2

## 2023-07-06 DIAGNOSIS — I34.0 MITRAL VALVE INSUFFICIENCY, UNSPECIFIED ETIOLOGY: ICD-10-CM

## 2023-07-06 DIAGNOSIS — I48.21 PERMANENT ATRIAL FIBRILLATION (HCC): ICD-10-CM

## 2023-07-06 DIAGNOSIS — Z13.820 SCREENING FOR OSTEOPOROSIS: ICD-10-CM

## 2023-07-06 DIAGNOSIS — R32 URINARY INCONTINENCE, UNSPECIFIED TYPE: ICD-10-CM

## 2023-07-06 DIAGNOSIS — D68.69 SECONDARY HYPERCOAGULABLE STATE (HCC): ICD-10-CM

## 2023-07-06 DIAGNOSIS — L98.9 FACIAL LESION: ICD-10-CM

## 2023-07-06 DIAGNOSIS — Z00.00 MEDICARE ANNUAL WELLNESS VISIT, SUBSEQUENT: ICD-10-CM

## 2023-07-06 DIAGNOSIS — Z12.31 VISIT FOR SCREENING MAMMOGRAM: Primary | ICD-10-CM

## 2023-07-06 DIAGNOSIS — Z23 NEED FOR PROPHYLACTIC VACCINATION AGAINST STREPTOCOCCUS PNEUMONIAE (PNEUMOCOCCUS): ICD-10-CM

## 2023-07-06 DIAGNOSIS — E03.9 ACQUIRED HYPOTHYROIDISM: ICD-10-CM

## 2023-07-06 DIAGNOSIS — E78.49 OTHER HYPERLIPIDEMIA: ICD-10-CM

## 2023-07-06 DIAGNOSIS — Z78.0 POST-MENOPAUSAL: ICD-10-CM

## 2023-07-06 DIAGNOSIS — R53.1 WEAKNESS: ICD-10-CM

## 2023-07-06 PROBLEM — R33.8 POSTOPERATIVE RETENTION OF URINE: Status: RESOLVED | Noted: 2021-08-18 | Resolved: 2023-07-06

## 2023-07-06 PROBLEM — Z98.890 S/P MITRAL VALVE REPAIR: Status: RESOLVED | Noted: 2017-03-09 | Resolved: 2023-07-06

## 2023-07-06 PROBLEM — N99.89 POSTOPERATIVE RETENTION OF URINE: Status: RESOLVED | Noted: 2021-08-18 | Resolved: 2023-07-06

## 2023-07-06 LAB
ALBUMIN SERPL-MCNC: 4.2 G/DL (ref 3.2–4.6)
ALBUMIN/GLOB SERPL: 1.4 (ref 0.4–1.6)
ALP SERPL-CCNC: 123 U/L (ref 50–136)
ALT SERPL-CCNC: 22 U/L (ref 12–65)
ANION GAP SERPL CALC-SCNC: 4 MMOL/L (ref 2–11)
AST SERPL-CCNC: 33 U/L (ref 15–37)
BASOPHILS # BLD: 0.1 K/UL (ref 0–0.2)
BASOPHILS NFR BLD: 1 % (ref 0–2)
BILIRUB SERPL-MCNC: 0.7 MG/DL (ref 0.2–1.1)
BUN SERPL-MCNC: 12 MG/DL (ref 8–23)
CALCIUM SERPL-MCNC: 9.5 MG/DL (ref 8.3–10.4)
CHLORIDE SERPL-SCNC: 106 MMOL/L (ref 101–110)
CHOLEST SERPL-MCNC: 199 MG/DL
CO2 SERPL-SCNC: 29 MMOL/L (ref 21–32)
CREAT SERPL-MCNC: 0.9 MG/DL (ref 0.6–1)
DIFFERENTIAL METHOD BLD: ABNORMAL
DIGOXIN SERPL-MCNC: 0.8 NG/ML (ref 0.9–2.1)
EOSINOPHIL # BLD: 0.1 K/UL (ref 0–0.8)
EOSINOPHIL NFR BLD: 1 % (ref 0.5–7.8)
ERYTHROCYTE [DISTWIDTH] IN BLOOD BY AUTOMATED COUNT: 12.9 % (ref 11.9–14.6)
GLOBULIN SER CALC-MCNC: 3.1 G/DL (ref 2.8–4.5)
GLUCOSE SERPL-MCNC: 93 MG/DL (ref 65–100)
HCT VFR BLD AUTO: 43.1 % (ref 35.8–46.3)
HDLC SERPL-MCNC: 52 MG/DL (ref 40–60)
HDLC SERPL: 3.8
HGB BLD-MCNC: 14 G/DL (ref 11.7–15.4)
IMM GRANULOCYTES # BLD AUTO: 0 K/UL (ref 0–0.5)
IMM GRANULOCYTES NFR BLD AUTO: 0 % (ref 0–5)
LDLC SERPL CALC-MCNC: 124.2 MG/DL
LYMPHOCYTES # BLD: 2.2 K/UL (ref 0.5–4.6)
LYMPHOCYTES NFR BLD: 27 % (ref 13–44)
MCH RBC QN AUTO: 31.4 PG (ref 26.1–32.9)
MCHC RBC AUTO-ENTMCNC: 32.5 G/DL (ref 31.4–35)
MCV RBC AUTO: 96.6 FL (ref 82–102)
MONOCYTES # BLD: 0.6 K/UL (ref 0.1–1.3)
MONOCYTES NFR BLD: 7 % (ref 4–12)
NEUTS SEG # BLD: 5.1 K/UL (ref 1.7–8.2)
NEUTS SEG NFR BLD: 64 % (ref 43–78)
NRBC # BLD: 0 K/UL (ref 0–0.2)
PLATELET # BLD AUTO: 189 K/UL (ref 150–450)
PMV BLD AUTO: 12.5 FL (ref 9.4–12.3)
POTASSIUM SERPL-SCNC: 3.9 MMOL/L (ref 3.5–5.1)
PROT SERPL-MCNC: 7.3 G/DL (ref 6.3–8.2)
RBC # BLD AUTO: 4.46 M/UL (ref 4.05–5.2)
SODIUM SERPL-SCNC: 139 MMOL/L (ref 133–143)
TRIGL SERPL-MCNC: 114 MG/DL (ref 35–150)
TSH, 3RD GENERATION: 0.8 UIU/ML (ref 0.36–3.74)
VLDLC SERPL CALC-MCNC: 22.8 MG/DL (ref 6–23)
WBC # BLD AUTO: 8 K/UL (ref 4.3–11.1)

## 2023-07-06 PROCEDURE — G8420 CALC BMI NORM PARAMETERS: HCPCS | Performed by: INTERNAL MEDICINE

## 2023-07-06 PROCEDURE — G8400 PT W/DXA NO RESULTS DOC: HCPCS | Performed by: INTERNAL MEDICINE

## 2023-07-06 PROCEDURE — G0009 ADMIN PNEUMOCOCCAL VACCINE: HCPCS | Performed by: INTERNAL MEDICINE

## 2023-07-06 PROCEDURE — 99213 OFFICE O/P EST LOW 20 MIN: CPT | Performed by: INTERNAL MEDICINE

## 2023-07-06 PROCEDURE — 1123F ACP DISCUSS/DSCN MKR DOCD: CPT | Performed by: INTERNAL MEDICINE

## 2023-07-06 PROCEDURE — G0439 PPPS, SUBSEQ VISIT: HCPCS | Performed by: INTERNAL MEDICINE

## 2023-07-06 PROCEDURE — G8427 DOCREV CUR MEDS BY ELIG CLIN: HCPCS | Performed by: INTERNAL MEDICINE

## 2023-07-06 PROCEDURE — 0509F URINE INCON PLAN DOCD: CPT | Performed by: INTERNAL MEDICINE

## 2023-07-06 PROCEDURE — 1036F TOBACCO NON-USER: CPT | Performed by: INTERNAL MEDICINE

## 2023-07-06 PROCEDURE — 1090F PRES/ABSN URINE INCON ASSESS: CPT | Performed by: INTERNAL MEDICINE

## 2023-07-06 PROCEDURE — 90677 PCV20 VACCINE IM: CPT | Performed by: INTERNAL MEDICINE

## 2023-07-06 SDOH — ECONOMIC STABILITY: FOOD INSECURITY: WITHIN THE PAST 12 MONTHS, THE FOOD YOU BOUGHT JUST DIDN'T LAST AND YOU DIDN'T HAVE MONEY TO GET MORE.: NEVER TRUE

## 2023-07-06 SDOH — ECONOMIC STABILITY: FOOD INSECURITY: WITHIN THE PAST 12 MONTHS, YOU WORRIED THAT YOUR FOOD WOULD RUN OUT BEFORE YOU GOT MONEY TO BUY MORE.: NEVER TRUE

## 2023-07-06 SDOH — ECONOMIC STABILITY: INCOME INSECURITY: HOW HARD IS IT FOR YOU TO PAY FOR THE VERY BASICS LIKE FOOD, HOUSING, MEDICAL CARE, AND HEATING?: NOT HARD AT ALL

## 2023-07-06 SDOH — ECONOMIC STABILITY: HOUSING INSECURITY
IN THE LAST 12 MONTHS, WAS THERE A TIME WHEN YOU DID NOT HAVE A STEADY PLACE TO SLEEP OR SLEPT IN A SHELTER (INCLUDING NOW)?: NO

## 2023-07-12 ENCOUNTER — ANTI-COAG VISIT (OUTPATIENT)
Dept: INTERNAL MEDICINE CLINIC | Facility: CLINIC | Age: 88
End: 2023-07-12

## 2023-07-12 DIAGNOSIS — Z79.01 ANTICOAGULANT LONG-TERM USE: ICD-10-CM

## 2023-07-12 DIAGNOSIS — I48.21 PERMANENT ATRIAL FIBRILLATION (HCC): Primary | ICD-10-CM

## 2023-07-12 LAB — INR BLD: 2.8

## 2023-07-12 NOTE — PROGRESS NOTES
Anticoagulation Summary  As of 2023      INR goal:  2.0-3.0   TTR:  74.8 % (1.1 y)   INR used for dosin.80 (2023)   Warfarin maintenance plan:  7.5 mg (5 mg x 1.5) every Thu; 5 mg (5 mg x 1) all other days   Weekly warfarin total:  37.5 mg   Plan last modified:  Slime Cabrera MD (2023)   Next INR check:  2023   Target end date:   Indefinite    Indications    Permanent atrial fibrillation (HCC) [I48.21]  Anticoagulant long-term use [Z79.01]                 Anticoagulation Episode Summary       INR check location:  Home Draw    Preferred lab:      Send INR reminders to:      Comments:            Anticoagulation Care Providers       Provider Role Specialty Phone number    Slime Cabrera MD [facility] Internal Medicine [phone]

## 2023-07-13 ENCOUNTER — TELEPHONE (OUTPATIENT)
Dept: INTERNAL MEDICINE CLINIC | Facility: CLINIC | Age: 88
End: 2023-07-13

## 2023-07-19 ENCOUNTER — ANTI-COAG VISIT (OUTPATIENT)
Dept: INTERNAL MEDICINE CLINIC | Facility: CLINIC | Age: 88
End: 2023-07-19

## 2023-07-19 DIAGNOSIS — I48.21 PERMANENT ATRIAL FIBRILLATION (HCC): Primary | ICD-10-CM

## 2023-07-19 DIAGNOSIS — Z79.01 ANTICOAGULANT LONG-TERM USE: ICD-10-CM

## 2023-07-19 LAB — INR BLD: 3

## 2023-07-19 NOTE — PROGRESS NOTES
Anticoagulation Summary  As of 7/19/2023      INR goal:  2.0-3.0   TTR:  75.3 % (1.1 y)   INR used for dosing:  3.00 (7/19/2023)   Warfarin maintenance plan:  7.5 mg (5 mg x 1.5) every Thu; 5 mg (5 mg x 1) all other days   Weekly warfarin total:  37.5 mg   Plan last modified:  Heron Orozco MD (4/27/2023)   Next INR check:  7/26/2023   Target end date:   Indefinite    Indications    Permanent atrial fibrillation (HCC) [I48.21]  Anticoagulant long-term use [Z79.01]                 Anticoagulation Episode Summary       INR check location:  Home Draw    Preferred lab:      Send INR reminders to:      Comments:            Anticoagulation Care Providers       Provider Role Specialty Phone number    Heron Orozco MD [facility] Internal Medicine [phone]

## 2023-07-26 ENCOUNTER — ANTI-COAG VISIT (OUTPATIENT)
Dept: INTERNAL MEDICINE CLINIC | Facility: CLINIC | Age: 88
End: 2023-07-26

## 2023-07-26 DIAGNOSIS — I48.21 PERMANENT ATRIAL FIBRILLATION (HCC): Primary | ICD-10-CM

## 2023-07-26 DIAGNOSIS — Z79.01 ANTICOAGULANT LONG-TERM USE: ICD-10-CM

## 2023-07-26 LAB — INR BLD: 3

## 2023-07-26 NOTE — PROGRESS NOTES
Anticoagulation Summary  As of 7/26/2023      INR goal:  2.0-3.0   TTR:  75.7 % (1.1 y)   INR used for dosing:  3.00 (7/26/2023)   Warfarin maintenance plan:  7.5 mg (5 mg x 1.5) every Thu; 5 mg (5 mg x 1) all other days   Weekly warfarin total:  37.5 mg   Plan last modified:  Allie Wong MD (4/27/2023)   Next INR check:  8/2/2023   Target end date:   Indefinite    Indications    Permanent atrial fibrillation (HCC) [I48.21]  Anticoagulant long-term use [Z79.01]                 Anticoagulation Episode Summary       INR check location:  Home Draw    Preferred lab:      Send INR reminders to:      Comments:            Anticoagulation Care Providers       Provider Role Specialty Phone number    Allie Wong MD [facility] Internal Medicine [phone]        '

## 2023-08-02 ENCOUNTER — ANTI-COAG VISIT (OUTPATIENT)
Dept: INTERNAL MEDICINE CLINIC | Facility: CLINIC | Age: 88
End: 2023-08-02

## 2023-08-02 DIAGNOSIS — Z79.01 ANTICOAGULANT LONG-TERM USE: ICD-10-CM

## 2023-08-02 DIAGNOSIS — I48.21 PERMANENT ATRIAL FIBRILLATION (HCC): Primary | ICD-10-CM

## 2023-08-02 LAB — INR BLD: 3.6

## 2023-08-02 NOTE — PROGRESS NOTES
Anticoagulation Summary  As of 8/2/2023      INR goal:  2.0-3.0   TTR:  74.4 % (1.2 y)   INR used for dosing:  3.60 (8/2/2023)   Warfarin maintenance plan:  7.5 mg (5 mg x 1.5) every Thu; 5 mg (5 mg x 1) all other days   Weekly warfarin total:  37.5 mg   Plan last modified:  Andria Burt MD (4/27/2023)   Next INR check:  8/9/2023   Target end date: Indefinite    Indications    Permanent atrial fibrillation (HCC) [I48.21]  Anticoagulant long-term use [Z79.01]                 Anticoagulation Episode Summary       INR check location:  Home Draw    Preferred lab:      Send INR reminders to:      Comments:            Anticoagulation Care Providers       Provider Role Specialty Phone number    Andria Burt MD [facility] Internal Medicine [phone]          The medication changes has been fully explained to the patient, who indicates understanding.

## 2023-08-09 ENCOUNTER — ANTI-COAG VISIT (OUTPATIENT)
Dept: INTERNAL MEDICINE CLINIC | Facility: CLINIC | Age: 88
End: 2023-08-09

## 2023-08-09 DIAGNOSIS — I48.21 PERMANENT ATRIAL FIBRILLATION (HCC): Primary | ICD-10-CM

## 2023-08-09 DIAGNOSIS — Z79.01 ANTICOAGULANT LONG-TERM USE: ICD-10-CM

## 2023-08-09 LAB — INR BLD: 2.6

## 2023-08-09 NOTE — PROGRESS NOTES
Anticoagulation Summary  As of 2023      INR goal:  2.0-3.0   TTR:  73.9 % (1.2 y)   INR used for dosin.60 (2023)   Warfarin maintenance plan:  2.5 mg (5 mg x 0.5) every Wed, Sat; 7.5 mg (5 mg x 1.5) every Thu; 5 mg (5 mg x 1) all other days   Weekly warfarin total:  32.5 mg   Plan last modified:  Sweta Buenrostro MD (2023)   Next INR check:  2023   Target end date:   Indefinite    Indications    Permanent atrial fibrillation (HCC) [I48.21]  Anticoagulant long-term use [Z79.01]                 Anticoagulation Episode Summary       INR check location:  Home Draw    Preferred lab:      Send INR reminders to:      Comments:            Anticoagulation Care Providers       Provider Role Specialty Phone number    Sweta Buenrostro MD [facility] Internal Medicine [phone]

## 2023-08-16 ENCOUNTER — ANTI-COAG VISIT (OUTPATIENT)
Dept: INTERNAL MEDICINE CLINIC | Facility: CLINIC | Age: 88
End: 2023-08-16

## 2023-08-16 DIAGNOSIS — I48.21 PERMANENT ATRIAL FIBRILLATION (HCC): Primary | ICD-10-CM

## 2023-08-16 DIAGNOSIS — Z79.01 ANTICOAGULANT LONG-TERM USE: ICD-10-CM

## 2023-08-16 LAB — INR BLD: 2.7

## 2023-08-16 NOTE — PROGRESS NOTES
Anticoagulation Summary  As of 2023      INR goal:  2.0-3.0   TTR:  74.3 % (1.2 y)   INR used for dosin.70 (2023)   Warfarin maintenance plan:  2.5 mg (5 mg x 0.5) every Wed, Sat; 7.5 mg (5 mg x 1.5) every Thu; 5 mg (5 mg x 1) all other days   Weekly warfarin total:  32.5 mg   Plan last modified:  Mayda Manuel MD (2023)   Next INR check:  2023   Target end date:   Indefinite    Indications    Permanent atrial fibrillation (HCC) [I48.21]  Anticoagulant long-term use [Z79.01]                 Anticoagulation Episode Summary       INR check location:  Home Draw    Preferred lab:      Send INR reminders to:      Comments:            Anticoagulation Care Providers       Provider Role Specialty Phone number    Mayda Manuel MD [facility] Internal Medicine [phone]

## 2023-08-23 ENCOUNTER — ANTI-COAG VISIT (OUTPATIENT)
Dept: INTERNAL MEDICINE CLINIC | Facility: CLINIC | Age: 88
End: 2023-08-23

## 2023-08-23 DIAGNOSIS — Z79.01 ANTICOAGULANT LONG-TERM USE: ICD-10-CM

## 2023-08-23 DIAGNOSIS — I48.21 PERMANENT ATRIAL FIBRILLATION (HCC): Primary | ICD-10-CM

## 2023-08-23 LAB — INR BLD: 2.2

## 2023-08-23 NOTE — PROGRESS NOTES
Anticoagulation Summary  As of 2023      INR goal:  2.0-3.0   TTR:  74.7 % (1.2 y)   INR used for dosin.20 (2023)   Warfarin maintenance plan:  2.5 mg (5 mg x 0.5) every Wed, Sat; 7.5 mg (5 mg x 1.5) every Thu; 5 mg (5 mg x 1) all other days   Weekly warfarin total:  32.5 mg   Plan last modified:  Miri Cagle MD (2023)   Next INR check:  2023   Target end date:   Indefinite    Indications    Permanent atrial fibrillation (HCC) [I48.21]  Anticoagulant long-term use [Z79.01]                 Anticoagulation Episode Summary       INR check location:  Home Draw    Preferred lab:      Send INR reminders to:      Comments:            Anticoagulation Care Providers       Provider Role Specialty Phone number    Miri Cagle MD [facility] Internal Medicine [phone]

## 2023-08-30 ENCOUNTER — ANTI-COAG VISIT (OUTPATIENT)
Dept: INTERNAL MEDICINE CLINIC | Facility: CLINIC | Age: 88
End: 2023-08-30

## 2023-08-30 DIAGNOSIS — Z79.01 ANTICOAGULANT LONG-TERM USE: ICD-10-CM

## 2023-08-30 DIAGNOSIS — I48.21 PERMANENT ATRIAL FIBRILLATION (HCC): Primary | ICD-10-CM

## 2023-08-30 LAB — INR BLD: 2.5

## 2023-08-30 NOTE — PROGRESS NOTES
Anticoagulation Summary  As of 2023      INR goal:  2.0-3.0   TTR:  75.1 % (1.2 y)   INR used for dosin.50 (2023)   Warfarin maintenance plan:  2.5 mg (5 mg x 0.5) every Wed, Sat; 7.5 mg (5 mg x 1.5) every Thu; 5 mg (5 mg x 1) all other days   Weekly warfarin total:  32.5 mg   Plan last modified:  Quinn Samuel MD (2023)   Next INR check:  2023   Target end date:   Indefinite    Indications    Permanent atrial fibrillation (HCC) [I48.21]  Anticoagulant long-term use [Z79.01]                 Anticoagulation Episode Summary       INR check location:  Home Draw    Preferred lab:      Send INR reminders to:      Comments:            Anticoagulation Care Providers       Provider Role Specialty Phone number    Quinn Samuel MD [facility] Internal Medicine [phone]

## 2023-09-13 ENCOUNTER — ANTI-COAG VISIT (OUTPATIENT)
Dept: INTERNAL MEDICINE CLINIC | Facility: CLINIC | Age: 88
End: 2023-09-13

## 2023-09-13 DIAGNOSIS — I48.21 PERMANENT ATRIAL FIBRILLATION (HCC): Primary | ICD-10-CM

## 2023-09-13 DIAGNOSIS — Z79.01 ANTICOAGULANT LONG-TERM USE: ICD-10-CM

## 2023-09-13 LAB — INR BLD: 2.5

## 2023-09-13 NOTE — PROGRESS NOTES
Anticoagulation Summary  As of 2023      INR goal:  2.0-3.0   TTR:  75.7 % (1.3 y)   INR used for dosin.50 (2023)   Warfarin maintenance plan:  2.5 mg (5 mg x 0.5) every Wed, Sat; 7.5 mg (5 mg x 1.5) every Thu; 5 mg (5 mg x 1) all other days   Weekly warfarin total:  32.5 mg   Plan last modified:  Remi Fernandez MD (2023)   Next INR check:  2023   Target end date:   Indefinite    Indications    Permanent atrial fibrillation (HCC) [I48.21]  Anticoagulant long-term use [Z79.01]                 Anticoagulation Episode Summary       INR check location:  Home Draw    Preferred lab:      Send INR reminders to:      Comments:            Anticoagulation Care Providers       Provider Role Specialty Phone number    Remi Fernandez MD [facility] Internal Medicine [phone]

## 2023-09-20 ENCOUNTER — ANTI-COAG VISIT (OUTPATIENT)
Dept: INTERNAL MEDICINE CLINIC | Facility: CLINIC | Age: 88
End: 2023-09-20

## 2023-09-20 DIAGNOSIS — Z79.01 ANTICOAGULANT LONG-TERM USE: ICD-10-CM

## 2023-09-20 DIAGNOSIS — I48.21 PERMANENT ATRIAL FIBRILLATION (HCC): Primary | ICD-10-CM

## 2023-09-20 LAB — INR BLD: 2.6

## 2023-09-20 NOTE — PROGRESS NOTES
Anticoagulation Summary  As of 2023      INR goal:  2.0-3.0   TTR:  76.0 % (1.3 y)   INR used for dosin.60 (2023)   Warfarin maintenance plan:  2.5 mg (5 mg x 0.5) every Wed, Sat; 7.5 mg (5 mg x 1.5) every Thu; 5 mg (5 mg x 1) all other days   Weekly warfarin total:  32.5 mg   Plan last modified:  Fransisco Brumfield MD (2023)   Next INR check:  2023   Target end date:   Indefinite    Indications    Permanent atrial fibrillation (HCC) [I48.21]  Anticoagulant long-term use [Z79.01]                 Anticoagulation Episode Summary       INR check location:  Home Draw    Preferred lab:      Send INR reminders to:      Comments:            Anticoagulation Care Providers       Provider Role Specialty Phone number    Fransisco Brumfield MD [facility] Internal Medicine [phone]

## 2023-09-27 ENCOUNTER — ANTI-COAG VISIT (OUTPATIENT)
Dept: INTERNAL MEDICINE CLINIC | Facility: CLINIC | Age: 88
End: 2023-09-27

## 2023-09-27 DIAGNOSIS — I48.21 PERMANENT ATRIAL FIBRILLATION (HCC): Primary | ICD-10-CM

## 2023-09-27 DIAGNOSIS — Z79.01 ANTICOAGULANT LONG-TERM USE: ICD-10-CM

## 2023-09-27 LAB — INR BLD: 3.9

## 2023-09-27 NOTE — PROGRESS NOTES
Anticoagulation Summary  As of 9/27/2023      INR goal:  2.0-3.0   TTR:  75.4 % (1.3 y)   INR used for dosing:  3.90 (9/27/2023)   Warfarin maintenance plan:  2.5 mg (5 mg x 0.5) every Wed, Sat; 7.5 mg (5 mg x 1.5) every Thu; 5 mg (5 mg x 1) all other days   Weekly warfarin total:  32.5 mg   Plan last modified:  Quinn Samuel MD (8/2/2023)   Next INR check:  10/4/2023   Target end date: Indefinite    Indications    Permanent atrial fibrillation (HCC) [I48.21]  Anticoagulant long-term use [Z79.01]                 Anticoagulation Episode Summary       INR check location:  Home Draw    Preferred lab:      Send INR reminders to:      Comments:            Anticoagulation Care Providers       Provider Role Specialty Phone number    Quinn Samuel MD Responsible Internal Medicine [phone]            Per pt she states she missed a dose of her medication. She was visiting her daughter and she realized her warfarin wasn't in her night time dose.

## 2023-10-04 ENCOUNTER — ANTI-COAG VISIT (OUTPATIENT)
Dept: INTERNAL MEDICINE CLINIC | Facility: CLINIC | Age: 88
End: 2023-10-04

## 2023-10-04 DIAGNOSIS — I48.21 PERMANENT ATRIAL FIBRILLATION (HCC): Primary | ICD-10-CM

## 2023-10-04 DIAGNOSIS — Z79.01 ANTICOAGULANT LONG-TERM USE: ICD-10-CM

## 2023-10-04 LAB — INR BLD: 2.2

## 2023-10-04 NOTE — PROGRESS NOTES
Anticoagulation Summary  As of 10/4/2023      INR goal:  2.0-3.0   TTR:  75.0 % (1.3 y)   INR used for dosin.20 (10/4/2023)   Warfarin maintenance plan:  2.5 mg (5 mg x 0.5) every Mon, Wed, Sat; 5 mg (5 mg x 1) all other days   Weekly warfarin total:  27.5 mg   Plan last modified:  Carolynne Schaumann, MD (2023)   Next INR check:  10/11/2023   Target end date:   Indefinite    Indications    Permanent atrial fibrillation (HCC) [I48.21]  Anticoagulant long-term use [Z79.01]                 Anticoagulation Episode Summary       INR check location:  Home Draw    Preferred lab:      Send INR reminders to:      Comments:            Anticoagulation Care Providers       Provider Role Specialty Phone number    Carolynne Schaumann, MD [facility] Internal Medicine [phone]

## 2023-10-11 ENCOUNTER — ANTI-COAG VISIT (OUTPATIENT)
Dept: INTERNAL MEDICINE CLINIC | Facility: CLINIC | Age: 88
End: 2023-10-11

## 2023-10-11 DIAGNOSIS — I48.21 PERMANENT ATRIAL FIBRILLATION (HCC): Primary | ICD-10-CM

## 2023-10-11 DIAGNOSIS — Z79.01 ANTICOAGULANT LONG-TERM USE: ICD-10-CM

## 2023-10-11 NOTE — PROGRESS NOTES
Anticoagulation Summary  As of 10/11/2023      INR goal:  2.0-3.0   TTR:  75.3 % (1.4 y)   INR used for dosin.30 (10/11/2023)   Warfarin maintenance plan:  2.5 mg (5 mg x 0.5) every Mon, Wed, Sat; 5 mg (5 mg x 1) all other days   Weekly warfarin total:  27.5 mg   Plan last modified:  Sadaf Mi MD (2023)   Next INR check:  10/18/2023   Target end date:   Indefinite    Indications    Permanent atrial fibrillation (HCC) [I48.21]  Anticoagulant long-term use [Z79.01]                 Anticoagulation Episode Summary       INR check location:  Home Draw    Preferred lab:      Send INR reminders to:      Comments:            Anticoagulation Care Providers       Provider Role Specialty Phone number    Sadaf Mi MD [facility] Internal Medicine [phone]

## 2023-10-18 ENCOUNTER — ANTI-COAG VISIT (OUTPATIENT)
Dept: INTERNAL MEDICINE CLINIC | Facility: CLINIC | Age: 88
End: 2023-10-18

## 2023-10-18 DIAGNOSIS — Z79.01 ANTICOAGULANT LONG-TERM USE: ICD-10-CM

## 2023-10-18 DIAGNOSIS — I48.21 PERMANENT ATRIAL FIBRILLATION (HCC): Primary | ICD-10-CM

## 2023-10-18 LAB — INR BLD: 2.1

## 2023-10-18 NOTE — PROGRESS NOTES
Anticoagulation Summary  As of 10/18/2023      INR goal:  2.0-3.0   TTR:  75.7 % (1.4 y)   INR used for dosin.10 (10/18/2023)   Warfarin maintenance plan:  2.5 mg (5 mg x 0.5) every Mon, Wed, Sat; 5 mg (5 mg x 1) all other days   Weekly warfarin total:  27.5 mg   Plan last modified:  Quinn Samuel MD (2023)   Next INR check:  10/25/2023   Target end date:   Indefinite    Indications    Permanent atrial fibrillation (HCC) [I48.21]  Anticoagulant long-term use [Z79.01]                 Anticoagulation Episode Summary       INR check location:  Home Draw    Preferred lab:      Send INR reminders to:      Comments:            Anticoagulation Care Providers       Provider Role Specialty Phone number    Quinn Samuel MD [facility] Internal Medicine [phone]

## 2023-10-25 ENCOUNTER — ANTI-COAG VISIT (OUTPATIENT)
Dept: INTERNAL MEDICINE CLINIC | Facility: CLINIC | Age: 88
End: 2023-10-25

## 2023-10-25 DIAGNOSIS — Z79.01 ANTICOAGULANT LONG-TERM USE: ICD-10-CM

## 2023-10-25 DIAGNOSIS — I48.21 PERMANENT ATRIAL FIBRILLATION (HCC): Primary | ICD-10-CM

## 2023-10-25 LAB — INR BLD: 1.9

## 2023-10-25 NOTE — PROGRESS NOTES
Anticoagulation Summary  As of 10/25/2023      INR goal:  2.0-3.0   TTR:  75.3 % (1.4 y)   INR used for dosin.90 (10/25/2023)   Warfarin maintenance plan:  2.5 mg (5 mg x 0.5) every Mon, Wed, Sat; 5 mg (5 mg x 1) all other days   Weekly warfarin total:  27.5 mg   Plan last modified:  Vick Valdivia MD (2023)   Next INR check:  2023   Target end date:   Indefinite    Indications    Permanent atrial fibrillation (HCC) [I48.21]  Anticoagulant long-term use [Z79.01]                 Anticoagulation Episode Summary       INR check location:  Home Draw    Preferred lab:      Send INR reminders to:      Comments:            Anticoagulation Care Providers       Provider Role Specialty Phone number    Vick Valdivia MD [facility] Internal Medicine [phone]            Medication changes has been explained to pt, pt verbally understands

## 2023-11-01 ENCOUNTER — ANTI-COAG VISIT (OUTPATIENT)
Dept: INTERNAL MEDICINE CLINIC | Facility: CLINIC | Age: 88
End: 2023-11-01

## 2023-11-01 DIAGNOSIS — I48.21 PERMANENT ATRIAL FIBRILLATION (HCC): Primary | ICD-10-CM

## 2023-11-01 DIAGNOSIS — Z79.01 ANTICOAGULANT LONG-TERM USE: ICD-10-CM

## 2023-11-01 LAB — INR BLD: 2

## 2023-11-01 NOTE — PROGRESS NOTES
Anticoagulation Summary  As of 2023      INR goal:  2.0-3.0   TTR:  74.3 % (1.4 y)   INR used for dosin.00 (2023)   Warfarin maintenance plan:  2.5 mg (5 mg x 0.5) every Mon, Fri; 5 mg (5 mg x 1) all other days   Weekly warfarin total:  30 mg   Plan last modified:  Slime Cabrera MD (10/26/2023)   Next INR check:  2023   Target end date:   Indefinite    Indications    Permanent atrial fibrillation (HCC) [I48.21]  Anticoagulant long-term use [Z79.01]                 Anticoagulation Episode Summary       INR check location:  Home Draw    Preferred lab:      Send INR reminders to:      Comments:            Anticoagulation Care Providers       Provider Role Specialty Phone number    Slime Cabrera MD [facility] Internal Medicine [phone]

## 2023-11-08 ENCOUNTER — ANTI-COAG VISIT (OUTPATIENT)
Dept: INTERNAL MEDICINE CLINIC | Facility: CLINIC | Age: 88
End: 2023-11-08

## 2023-11-08 DIAGNOSIS — I48.21 PERMANENT ATRIAL FIBRILLATION (HCC): Primary | ICD-10-CM

## 2023-11-08 DIAGNOSIS — Z79.01 ANTICOAGULANT LONG-TERM USE: ICD-10-CM

## 2023-11-08 LAB — INR BLD: 1.9

## 2023-11-08 RX ORDER — WARFARIN SODIUM 5 MG/1
5 TABLET ORAL DAILY
Qty: 100 TABLET | Refills: 3 | Status: SHIPPED | OUTPATIENT
Start: 2023-11-08

## 2023-11-08 RX ORDER — LEVOTHYROXINE SODIUM 0.1 MG/1
100 TABLET ORAL
Qty: 90 TABLET | Refills: 3 | Status: SHIPPED | OUTPATIENT
Start: 2023-11-08

## 2023-11-08 RX ORDER — DILTIAZEM HYDROCHLORIDE 240 MG/1
CAPSULE, COATED, EXTENDED RELEASE ORAL
Qty: 90 CAPSULE | Refills: 10 | Status: SHIPPED | OUTPATIENT
Start: 2023-11-08

## 2023-11-08 NOTE — PROGRESS NOTES
Anticoagulation Summary  As of 2023      INR goal:  2.0-3.0   TTR:  73.3 % (1.4 y)   INR used for dosin.90 (2023)   Warfarin maintenance plan:  2.5 mg (5 mg x 0.5) every Mon, Fri; 5 mg (5 mg x 1) all other days   Weekly warfarin total:  30 mg   Plan last modified:  Erasmo Jha MD (10/26/2023)   Next INR check:  11/15/2023   Target end date:   Indefinite    Indications    Permanent atrial fibrillation (HCC) [I48.21]  Anticoagulant long-term use [Z79.01]                 Anticoagulation Episode Summary       INR check location:  Home Draw    Preferred lab:      Send INR reminders to:      Comments:            Anticoagulation Care Providers       Provider Role Specialty Phone number    Erasmo Jha MD [facility] Internal Medicine [phone]

## 2023-11-08 NOTE — TELEPHONE ENCOUNTER
Requested Prescriptions     Pending Prescriptions Disp Refills    dilTIAZem (CARDIZEM CD) 240 MG extended release capsule [Pharmacy Med Name: DILTIAZEM HYDROCHLORIDE  MG Capsule Extended Release 24 Hour] 90 capsule 10     Sig: TAKE 1 CAPSULE EVERY DAY

## 2023-11-15 ENCOUNTER — ANTI-COAG VISIT (OUTPATIENT)
Dept: INTERNAL MEDICINE CLINIC | Facility: CLINIC | Age: 88
End: 2023-11-15

## 2023-11-15 DIAGNOSIS — Z79.01 ANTICOAGULANT LONG-TERM USE: ICD-10-CM

## 2023-11-15 DIAGNOSIS — I48.21 PERMANENT ATRIAL FIBRILLATION (HCC): Primary | ICD-10-CM

## 2023-11-15 LAB — INR BLD: 2.7

## 2023-11-15 NOTE — PROGRESS NOTES
Anticoagulation Summary  As of 11/15/2023      INR goal:  2.0-3.0   TTR:  73.5 % (1.4 y)   INR used for dosin.70 (11/15/2023)   Warfarin maintenance plan:  2.5 mg (5 mg x 0.5) every Mon, Fri; 5 mg (5 mg x 1) all other days   Weekly warfarin total:  30 mg   Plan last modified:  Chelsea Hernandez MD (10/26/2023)   Next INR check:  2023   Target end date:   Indefinite    Indications    Permanent atrial fibrillation (HCC) [I48.21]  Anticoagulant long-term use [Z79.01]                 Anticoagulation Episode Summary       INR check location:  Home Draw    Preferred lab:      Send INR reminders to:      Comments:            Anticoagulation Care Providers       Provider Role Specialty Phone number    Chelsea Hernandez MD [facility] Internal Medicine [phone]

## 2023-11-22 ENCOUNTER — ANTI-COAG VISIT (OUTPATIENT)
Dept: INTERNAL MEDICINE CLINIC | Facility: CLINIC | Age: 88
End: 2023-11-22

## 2023-11-22 DIAGNOSIS — Z79.01 ANTICOAGULANT LONG-TERM USE: ICD-10-CM

## 2023-11-22 DIAGNOSIS — I48.21 PERMANENT ATRIAL FIBRILLATION (HCC): Primary | ICD-10-CM

## 2023-11-22 LAB — INR BLD: 1.9

## 2023-11-22 NOTE — PROGRESS NOTES
Anticoagulation Summary  As of 2023      INR goal:  2.0-3.0   TTR:  73.7 % (1.5 y)   INR used for dosin.90 (2023)   Warfarin maintenance plan:  2.5 mg (5 mg x 0.5) every Mon, Fri; 5 mg (5 mg x 1) all other days   Weekly warfarin total:  30 mg   Plan last modified:  Sheri Lehman MD (10/26/2023)   Next INR check:  2023   Target end date:   Indefinite    Indications    Permanent atrial fibrillation (HCC) [I48.21]  Anticoagulant long-term use [Z79.01]                 Anticoagulation Episode Summary       INR check location:  Home Draw    Preferred lab:      Send INR reminders to:      Comments:            Anticoagulation Care Providers       Provider Role Specialty Phone number    Sheri Lehman MD [facility] Internal Medicine [phone]

## 2023-11-29 LAB — INR BLD: 1.9

## 2023-11-30 ENCOUNTER — ANTI-COAG VISIT (OUTPATIENT)
Dept: INTERNAL MEDICINE CLINIC | Facility: CLINIC | Age: 88
End: 2023-11-30

## 2023-11-30 DIAGNOSIS — I48.21 PERMANENT ATRIAL FIBRILLATION (HCC): Primary | ICD-10-CM

## 2023-11-30 DIAGNOSIS — Z79.01 ANTICOAGULANT LONG-TERM USE: ICD-10-CM

## 2023-11-30 NOTE — PROGRESS NOTES
Anticoagulation Summary  As of 2023      INR goal:  2.0-3.0   TTR:  72.7 % (1.5 y)   INR used for dosin.90 (2023)   Warfarin maintenance plan:  2.5 mg (5 mg x 0.5) every Mon, Fri; 5 mg (5 mg x 1) all other days   Weekly warfarin total:  30 mg   Plan last modified:  Vick Valdivia MD (10/26/2023)   Next INR check:  2023   Target end date: Indefinite    Indications    Permanent atrial fibrillation (HCC) [I48.21]  Anticoagulant long-term use [Z79.01]                 Anticoagulation Episode Summary       INR check location:  Home Draw    Preferred lab:      Send INR reminders to:      Comments:            Anticoagulation Care Providers       Provider Role Specialty Phone number    Vick Valdivia MD [facility] Internal Medicine [phone]        The medication changes has been fully explained to the patient, who indicates understanding.

## 2023-12-06 ENCOUNTER — ANTI-COAG VISIT (OUTPATIENT)
Dept: INTERNAL MEDICINE CLINIC | Facility: CLINIC | Age: 88
End: 2023-12-06

## 2023-12-06 DIAGNOSIS — Z79.01 ANTICOAGULANT LONG-TERM USE: ICD-10-CM

## 2023-12-06 DIAGNOSIS — I48.21 PERMANENT ATRIAL FIBRILLATION (HCC): Primary | ICD-10-CM

## 2023-12-06 LAB — INR BLD: 2.2

## 2023-12-06 NOTE — PROGRESS NOTES
Anticoagulation Summary  As of 2023      INR goal:  2.0-3.0   TTR:  72.6 % (1.5 y)   INR used for dosin.20 (2023)   Warfarin maintenance plan:  2.5 mg (5 mg x 0.5) every Mon; 5 mg (5 mg x 1) all other days   Weekly warfarin total:  32.5 mg   Plan last modified:  Jim Osorio MD (2023)   Next INR check:  2023   Target end date:   Indefinite    Indications    Permanent atrial fibrillation (HCC) [I48.21]  Anticoagulant long-term use [Z79.01]                 Anticoagulation Episode Summary       INR check location:  Home Draw    Preferred lab:      Send INR reminders to:      Comments:            Anticoagulation Care Providers       Provider Role Specialty Phone number    Jim Osorio MD [facility] Internal Medicine [phone]

## 2023-12-13 ENCOUNTER — ANTI-COAG VISIT (OUTPATIENT)
Dept: INTERNAL MEDICINE CLINIC | Facility: CLINIC | Age: 88
End: 2023-12-13

## 2023-12-13 DIAGNOSIS — I48.21 PERMANENT ATRIAL FIBRILLATION (HCC): Primary | ICD-10-CM

## 2023-12-13 DIAGNOSIS — Z79.01 ANTICOAGULANT LONG-TERM USE: ICD-10-CM

## 2023-12-13 LAB — INR BLD: 2

## 2023-12-13 NOTE — PROGRESS NOTES
Anticoagulation Summary  As of 2023      INR goal:  2.0-3.0   TTR:  73.0 % (1.5 y)   INR used for dosin.00 (2023)   Warfarin maintenance plan:  2.5 mg (5 mg x 0.5) every Mon; 5 mg (5 mg x 1) all other days   Weekly warfarin total:  32.5 mg   Plan last modified:  Quinn Samuel MD (2023)   Next INR check:  2023   Target end date:   Indefinite    Indications    Permanent atrial fibrillation (HCC) [I48.21]  Anticoagulant long-term use [Z79.01]                 Anticoagulation Episode Summary       INR check location:  Home Draw    Preferred lab:      Send INR reminders to:      Comments:            Anticoagulation Care Providers       Provider Role Specialty Phone number    Quinn Samuel MD [facility] Internal Medicine [phone]

## 2023-12-28 ENCOUNTER — TELEPHONE (OUTPATIENT)
Dept: INTERNAL MEDICINE CLINIC | Facility: CLINIC | Age: 88
End: 2023-12-28

## 2023-12-28 NOTE — TELEPHONE ENCOUNTER
FRANCISCO:    Ms. Park's daughter called to let you know that Ms. Park is out of town and wasn't able to get her INR done today, but will try to get this done tomorrow.

## 2023-12-29 ENCOUNTER — ANTI-COAG VISIT (OUTPATIENT)
Dept: INTERNAL MEDICINE CLINIC | Facility: CLINIC | Age: 88
End: 2023-12-29

## 2023-12-29 DIAGNOSIS — Z79.01 ANTICOAGULANT LONG-TERM USE: ICD-10-CM

## 2023-12-29 DIAGNOSIS — I48.21 PERMANENT ATRIAL FIBRILLATION (HCC): Primary | ICD-10-CM

## 2023-12-29 LAB — INR BLD: 3.2

## 2023-12-29 NOTE — PROGRESS NOTES
Anticoagulation Summary  As of 12/29/2023      INR goal:  2.0-3.0   TTR:  72.0 % (1.6 y)   INR used for dosing:  3.20 (12/29/2023)   Warfarin maintenance plan:  5 mg (5 mg x 1) every day   Weekly warfarin total:  35 mg   Plan last modified:  Jim Osorio MD (12/22/2023)   Next INR check:  1/5/2024   Target end date:   Indefinite    Indications    Permanent atrial fibrillation (HCC) [I48.21]  Anticoagulant long-term use [Z79.01]                 Anticoagulation Episode Summary       INR check location:  Home Draw    Preferred lab:      Send INR reminders to:      Comments:            Anticoagulation Care Providers       Provider Role Specialty Phone number    Jim Osorio MD [facility] Internal Medicine [phone]

## 2024-01-03 LAB — INR BLD: 2.5

## 2024-01-04 ENCOUNTER — ANTI-COAG VISIT (OUTPATIENT)
Dept: INTERNAL MEDICINE CLINIC | Facility: CLINIC | Age: 89
End: 2024-01-04

## 2024-01-04 DIAGNOSIS — Z79.01 ANTICOAGULANT LONG-TERM USE: ICD-10-CM

## 2024-01-04 DIAGNOSIS — I48.21 PERMANENT ATRIAL FIBRILLATION (HCC): Primary | ICD-10-CM

## 2024-01-04 NOTE — PROGRESS NOTES
Anticoagulation Summary  As of 2024      INR goal:  2.0-3.0   TTR:  72.0 % (1.6 y)   INR used for dosin.50 (1/3/2024)   Warfarin maintenance plan:  2.5 mg (5 mg x 0.5) every Fri; 5 mg (5 mg x 1) all other days   Weekly warfarin total:  32.5 mg   Plan last modified:  Drew Kovacs MD (2023)   Next INR check:  1/10/2024   Target end date:  Indefinite    Indications    Permanent atrial fibrillation (HCC) [I48.21]  Anticoagulant long-term use [Z79.01]                 Anticoagulation Episode Summary       INR check location:  Home Draw    Preferred lab:      Send INR reminders to:      Comments:            Anticoagulation Care Providers       Provider Role Specialty Phone number    Drew Kovacs MD [facility] Internal Medicine [phone]

## 2024-01-10 ENCOUNTER — ANTI-COAG VISIT (OUTPATIENT)
Dept: INTERNAL MEDICINE CLINIC | Facility: CLINIC | Age: 89
End: 2024-01-10

## 2024-01-10 DIAGNOSIS — I48.21 PERMANENT ATRIAL FIBRILLATION (HCC): Primary | ICD-10-CM

## 2024-01-10 DIAGNOSIS — Z79.01 ANTICOAGULANT LONG-TERM USE: ICD-10-CM

## 2024-01-10 LAB — INR BLD: 2.1

## 2024-01-10 NOTE — PROGRESS NOTES
Anticoagulation Summary  As of 1/10/2024      INR goal:  2.0-3.0   TTR:  72.3 % (1.6 y)   INR used for dosin.10 (1/10/2024)   Warfarin maintenance plan:  2.5 mg (5 mg x 0.5) every Fri; 5 mg (5 mg x 1) all other days   Weekly warfarin total:  32.5 mg   Plan last modified:  Drew Kovacs MD (2023)   Next INR check:  2024   Target end date:  Indefinite    Indications    Permanent atrial fibrillation (HCC) [I48.21]  Anticoagulant long-term use [Z79.01]                 Anticoagulation Episode Summary       INR check location:  Home Draw    Preferred lab:      Send INR reminders to:      Comments:            Anticoagulation Care Providers       Provider Role Specialty Phone number    Drew Kovacs MD [facility] Internal Medicine [phone]

## 2024-01-17 ENCOUNTER — ANTI-COAG VISIT (OUTPATIENT)
Dept: INTERNAL MEDICINE CLINIC | Facility: CLINIC | Age: 89
End: 2024-01-17

## 2024-01-17 DIAGNOSIS — Z79.01 ANTICOAGULANT LONG-TERM USE: ICD-10-CM

## 2024-01-17 DIAGNOSIS — I48.21 PERMANENT ATRIAL FIBRILLATION (HCC): Primary | ICD-10-CM

## 2024-01-17 LAB — INR BLD: 2.8

## 2024-01-17 PROCEDURE — 93296 REM INTERROG EVL PM/IDS: CPT | Performed by: INTERNAL MEDICINE

## 2024-01-17 PROCEDURE — 93294 REM INTERROG EVL PM/LDLS PM: CPT | Performed by: INTERNAL MEDICINE

## 2024-01-17 NOTE — PROGRESS NOTES
Anticoagulation Summary  As of 2024      INR goal:  2.0-3.0   TTR:  72.6 % (1.6 y)   INR used for dosin.80 (2024)   Warfarin maintenance plan:  2.5 mg (5 mg x 0.5) every Fri; 5 mg (5 mg x 1) all other days   Weekly warfarin total:  32.5 mg   Plan last modified:  Drew Kovacs MD (2023)   Next INR check:  2024   Target end date:  Indefinite    Indications    Permanent atrial fibrillation (HCC) [I48.21]  Anticoagulant long-term use [Z79.01]                 Anticoagulation Episode Summary       INR check location:  Home Draw    Preferred lab:      Send INR reminders to:      Comments:            Anticoagulation Care Providers       Provider Role Specialty Phone number    Drew Kovacs MD [facility] Internal Medicine [phone]

## 2024-01-24 LAB — INR BLD: 3.4

## 2024-01-25 ENCOUNTER — ANTI-COAG VISIT (OUTPATIENT)
Dept: INTERNAL MEDICINE CLINIC | Facility: CLINIC | Age: 89
End: 2024-01-25

## 2024-01-25 DIAGNOSIS — Z79.01 ANTICOAGULANT LONG-TERM USE: ICD-10-CM

## 2024-01-25 DIAGNOSIS — I48.21 PERMANENT ATRIAL FIBRILLATION (HCC): Primary | ICD-10-CM

## 2024-01-25 NOTE — PROGRESS NOTES
Anticoagulation Summary  As of 1/25/2024      INR goal:  2.0-3.0   TTR:  72.2 % (1.6 y)   INR used for dosing:  3.40 (1/24/2024)   Warfarin maintenance plan:  2.5 mg (5 mg x 0.5) every Fri; 5 mg (5 mg x 1) all other days   Weekly warfarin total:  32.5 mg   Plan last modified:  Drew Kovacs MD (12/29/2023)   Next INR check:  1/31/2024   Target end date:  Indefinite    Indications    Permanent atrial fibrillation (HCC) [I48.21]  Anticoagulant long-term use [Z79.01]                 Anticoagulation Episode Summary       INR check location:  Home Draw    Preferred lab:      Send INR reminders to:      Comments:            Anticoagulation Care Providers       Provider Role Specialty Phone number    Drew Kovacs MD [facility] Internal Medicine [phone]          The medication changes have been fully explained to the patient, who indicates understanding.

## 2024-01-31 LAB — INR BLD: 2.3

## 2024-02-01 ENCOUNTER — ANTI-COAG VISIT (OUTPATIENT)
Dept: INTERNAL MEDICINE CLINIC | Facility: CLINIC | Age: 89
End: 2024-02-01

## 2024-02-01 DIAGNOSIS — Z79.01 ANTICOAGULANT LONG-TERM USE: ICD-10-CM

## 2024-02-01 DIAGNOSIS — I48.21 PERMANENT ATRIAL FIBRILLATION (HCC): Primary | ICD-10-CM

## 2024-02-01 NOTE — PROGRESS NOTES
Anticoagulation Summary  As of 2024      INR goal:  2.0-3.0   TTR:  72.1 % (1.7 y)   INR used for dosin.30 (2024)   Warfarin maintenance plan:  2.5 mg (5 mg x 0.5) every Thu, Fri; 5 mg (5 mg x 1) all other days   Weekly warfarin total:  30 mg   Plan last modified:  Drew Kovacs MD (2024)   Next INR check:  2024   Target end date:  Indefinite    Indications    Permanent atrial fibrillation (HCC) [I48.21]  Anticoagulant long-term use [Z79.01]                 Anticoagulation Episode Summary       INR check location:  Home Draw    Preferred lab:      Send INR reminders to:      Comments:            Anticoagulation Care Providers       Provider Role Specialty Phone number    Drew Kovacs MD [facility] Internal Medicine [phone]

## 2024-02-08 ENCOUNTER — ANTI-COAG VISIT (OUTPATIENT)
Dept: INTERNAL MEDICINE CLINIC | Facility: CLINIC | Age: 89
End: 2024-02-08

## 2024-02-08 DIAGNOSIS — I48.21 PERMANENT ATRIAL FIBRILLATION (HCC): Primary | ICD-10-CM

## 2024-02-08 DIAGNOSIS — Z79.01 ANTICOAGULANT LONG-TERM USE: ICD-10-CM

## 2024-02-08 LAB — INR BLD: 2.4

## 2024-02-08 NOTE — PROGRESS NOTES
Anticoagulation Summary  As of 2024      INR goal:  2.0-3.0   TTR:  72.5 % (1.7 y)   INR used for dosin.40 (2024)   Warfarin maintenance plan:  2.5 mg (5 mg x 0.5) every Thu, Fri; 5 mg (5 mg x 1) all other days   Weekly warfarin total:  30 mg   Plan last modified:  Drew Kovacs MD (2024)   Next INR check:  2/15/2024   Target end date:  Indefinite    Indications    Permanent atrial fibrillation (HCC) [I48.21]  Anticoagulant long-term use [Z79.01]                 Anticoagulation Episode Summary       INR check location:  Home Draw    Preferred lab:      Send INR reminders to:      Comments:            Anticoagulation Care Providers       Provider Role Specialty Phone number    Drew Kovacs MD [facility] Internal Medicine [phone]

## 2024-02-14 LAB — INR BLD: 2.1

## 2024-02-15 ENCOUNTER — ANTI-COAG VISIT (OUTPATIENT)
Dept: INTERNAL MEDICINE CLINIC | Facility: CLINIC | Age: 89
End: 2024-02-15

## 2024-02-15 DIAGNOSIS — I48.21 PERMANENT ATRIAL FIBRILLATION (HCC): Primary | ICD-10-CM

## 2024-02-15 DIAGNOSIS — Z79.01 ANTICOAGULANT LONG-TERM USE: ICD-10-CM

## 2024-02-15 NOTE — PROGRESS NOTES
Anticoagulation Summary  As of 2/15/2024      INR goal:  2.0-3.0   TTR:  72.7 % (1.7 y)   INR used for dosin.10 (2024)   Warfarin maintenance plan:  2.5 mg (5 mg x 0.5) every Thu, Fri; 5 mg (5 mg x 1) all other days   Weekly warfarin total:  30 mg   Plan last modified:  Drew Kovacs MD (2024)   Next INR check:  2024   Target end date:  Indefinite    Indications    Permanent atrial fibrillation (HCC) [I48.21]  Anticoagulant long-term use [Z79.01]                 Anticoagulation Episode Summary       INR check location:  Home Draw    Preferred lab:      Send INR reminders to:      Comments:            Anticoagulation Care Providers       Provider Role Specialty Phone number    Drew Kovacs MD [facility] Internal Medicine [phone]

## 2024-02-21 ENCOUNTER — OFFICE VISIT (OUTPATIENT)
Dept: INTERNAL MEDICINE CLINIC | Facility: CLINIC | Age: 89
End: 2024-02-21
Payer: COMMERCIAL

## 2024-02-21 ENCOUNTER — ANTI-COAG VISIT (OUTPATIENT)
Dept: INTERNAL MEDICINE CLINIC | Facility: CLINIC | Age: 89
End: 2024-02-21

## 2024-02-21 VITALS
RESPIRATION RATE: 16 BRPM | HEIGHT: 69 IN | TEMPERATURE: 97.2 F | DIASTOLIC BLOOD PRESSURE: 62 MMHG | BODY MASS INDEX: 21.8 KG/M2 | SYSTOLIC BLOOD PRESSURE: 125 MMHG | HEART RATE: 77 BPM | WEIGHT: 147.2 LBS

## 2024-02-21 DIAGNOSIS — Z95.0 PRESENCE OF CARDIAC PACEMAKER: ICD-10-CM

## 2024-02-21 DIAGNOSIS — I48.21 PERMANENT ATRIAL FIBRILLATION (HCC): ICD-10-CM

## 2024-02-21 DIAGNOSIS — R53.1 WEAKNESS: ICD-10-CM

## 2024-02-21 DIAGNOSIS — R06.09 OTHER FORM OF DYSPNEA: ICD-10-CM

## 2024-02-21 DIAGNOSIS — I49.5 SICK SINUS SYNDROME (HCC): ICD-10-CM

## 2024-02-21 DIAGNOSIS — Z79.01 ANTICOAGULANT LONG-TERM USE: ICD-10-CM

## 2024-02-21 DIAGNOSIS — Z23 NEEDS FLU SHOT: Primary | ICD-10-CM

## 2024-02-21 DIAGNOSIS — I48.21 PERMANENT ATRIAL FIBRILLATION (HCC): Primary | ICD-10-CM

## 2024-02-21 DIAGNOSIS — D68.69 SECONDARY HYPERCOAGULABLE STATE (HCC): ICD-10-CM

## 2024-02-21 DIAGNOSIS — E03.9 ACQUIRED HYPOTHYROIDISM: ICD-10-CM

## 2024-02-21 LAB
BASOPHILS # BLD: 0 K/UL (ref 0–0.2)
BASOPHILS NFR BLD: 1 % (ref 0–2)
DIFFERENTIAL METHOD BLD: ABNORMAL
DIGOXIN SERPL-MCNC: 1.1 NG/ML (ref 0.9–2.1)
EOSINOPHIL # BLD: 0.1 K/UL (ref 0–0.8)
EOSINOPHIL NFR BLD: 1 % (ref 0.5–7.8)
ERYTHROCYTE [DISTWIDTH] IN BLOOD BY AUTOMATED COUNT: 13.5 % (ref 11.9–14.6)
HCT VFR BLD AUTO: 38.5 % (ref 35.8–46.3)
HGB BLD-MCNC: 12.4 G/DL (ref 11.7–15.4)
IMM GRANULOCYTES # BLD AUTO: 0 K/UL (ref 0–0.5)
IMM GRANULOCYTES NFR BLD AUTO: 0 % (ref 0–5)
INR BLD: 2.6
LYMPHOCYTES # BLD: 1.7 K/UL (ref 0.5–4.6)
LYMPHOCYTES NFR BLD: 24 % (ref 13–44)
MCH RBC QN AUTO: 31.1 PG (ref 26.1–32.9)
MCHC RBC AUTO-ENTMCNC: 32.2 G/DL (ref 31.4–35)
MCV RBC AUTO: 96.5 FL (ref 82–102)
MONOCYTES # BLD: 0.4 K/UL (ref 0.1–1.3)
MONOCYTES NFR BLD: 5 % (ref 4–12)
NEUTS SEG # BLD: 4.8 K/UL (ref 1.7–8.2)
NEUTS SEG NFR BLD: 69 % (ref 43–78)
NRBC # BLD: 0 K/UL (ref 0–0.2)
PLATELET # BLD AUTO: 216 K/UL (ref 150–450)
PMV BLD AUTO: 12.8 FL (ref 9.4–12.3)
RBC # BLD AUTO: 3.99 M/UL (ref 4.05–5.2)
WBC # BLD AUTO: 7 K/UL (ref 4.3–11.1)

## 2024-02-21 PROCEDURE — 1036F TOBACCO NON-USER: CPT | Performed by: INTERNAL MEDICINE

## 2024-02-21 PROCEDURE — G8427 DOCREV CUR MEDS BY ELIG CLIN: HCPCS | Performed by: INTERNAL MEDICINE

## 2024-02-21 PROCEDURE — 1123F ACP DISCUSS/DSCN MKR DOCD: CPT | Performed by: INTERNAL MEDICINE

## 2024-02-21 PROCEDURE — 99214 OFFICE O/P EST MOD 30 MIN: CPT | Performed by: INTERNAL MEDICINE

## 2024-02-21 PROCEDURE — G8420 CALC BMI NORM PARAMETERS: HCPCS | Performed by: INTERNAL MEDICINE

## 2024-02-21 PROCEDURE — 90694 VACC AIIV4 NO PRSRV 0.5ML IM: CPT | Performed by: INTERNAL MEDICINE

## 2024-02-21 PROCEDURE — G8484 FLU IMMUNIZE NO ADMIN: HCPCS | Performed by: INTERNAL MEDICINE

## 2024-02-21 PROCEDURE — 90471 IMMUNIZATION ADMIN: CPT | Performed by: INTERNAL MEDICINE

## 2024-02-21 PROCEDURE — 1090F PRES/ABSN URINE INCON ASSESS: CPT | Performed by: INTERNAL MEDICINE

## 2024-02-21 RX ORDER — DIPHENHYDRAMINE HCL 25 MG
25 TABLET ORAL NIGHTLY
COMMUNITY

## 2024-02-21 RX ORDER — ASCORBIC ACID 500 MG
500 TABLET ORAL DAILY
COMMUNITY

## 2024-02-21 RX ORDER — ACETAMINOPHEN 160 MG
2000 TABLET,DISINTEGRATING ORAL DAILY
COMMUNITY

## 2024-02-21 ASSESSMENT — PATIENT HEALTH QUESTIONNAIRE - PHQ9
1. LITTLE INTEREST OR PLEASURE IN DOING THINGS: 0
SUM OF ALL RESPONSES TO PHQ QUESTIONS 1-9: 0
SUM OF ALL RESPONSES TO PHQ QUESTIONS 1-9: 0
SUM OF ALL RESPONSES TO PHQ9 QUESTIONS 1 & 2: 0
SUM OF ALL RESPONSES TO PHQ QUESTIONS 1-9: 0
SUM OF ALL RESPONSES TO PHQ QUESTIONS 1-9: 0
2. FEELING DOWN, DEPRESSED OR HOPELESS: 0

## 2024-02-21 NOTE — PROGRESS NOTES
2/22/2024 12:41 PM  Location:[facility] PHYSICIAN SERVICES  [facility] INTERNAL MEDICINE  [place]  Patient #:  [id number]  YOB: 1935          YOUR LAST HEMOGLOBIN A1CS:   No results found for: \"HBA1C\", \"[id number]\"    YOUR LAST LIPID PROFILE:   Lab Results   Component Value Date/Time    CHOL 199 07/06/2023 10:18 AM    HDL 52 07/06/2023 10:18 AM         Lab Results   Component Value Date/Time    GFRAA >60 07/08/2022 02:20 PM    BUN 12 07/06/2023 10:18 AM     07/06/2023 10:18 AM    K 3.9 07/06/2023 10:18 AM     07/06/2023 10:18 AM    CO2 29 07/06/2023 10:18 AM    DIG 0.9 11/13/2020 02:40 PM           History of Present Illness     Chief Complaint   Patient presents with    6 Month Follow-Up     Pt presents to the office today for a 6 month follow-up      Foot Problem     Having numbness and tingling in the feet. Feet stay cold.   This patient is having increasing problems with generalized weakness denies palpitations.  She is followed by cardiology for A-fib.  She does have some paresthesias in her feet states they stay cold all the time.  She lives alone she denies any falls.    Ms. Park is a 88 y.o. 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 MRI safe dual

## 2024-02-21 NOTE — PROGRESS NOTES
Anticoagulation Summary  As of 2024      INR goal:  2.0-3.0   TTR:  73.0 % (1.7 y)   INR used for dosin.60 (2024)   Warfarin maintenance plan:  2.5 mg (5 mg x 0.5) every Thu, Fri; 5 mg (5 mg x 1) all other days   Weekly warfarin total:  30 mg   Plan last modified:  Drew Kovacs MD (2024)   Next INR check:  2024   Target end date:  Indefinite    Indications    Permanent atrial fibrillation (HCC) [I48.21]  Anticoagulant long-term use [Z79.01]                 Anticoagulation Episode Summary       INR check location:  Home Draw    Preferred lab:      Send INR reminders to:      Comments:            Anticoagulation Care Providers       Provider Role Specialty Phone number    Drew Kovacs MD [facility] Internal Medicine [phone]

## 2024-02-22 LAB
ANION GAP SERPL CALC-SCNC: 6 MMOL/L (ref 2–11)
BUN SERPL-MCNC: 13 MG/DL (ref 8–23)
CALCIUM SERPL-MCNC: 10 MG/DL (ref 8.3–10.4)
CHLORIDE SERPL-SCNC: 104 MMOL/L (ref 103–113)
CO2 SERPL-SCNC: 30 MMOL/L (ref 21–32)
CREAT SERPL-MCNC: 0.9 MG/DL (ref 0.6–1)
GLUCOSE SERPL-MCNC: 93 MG/DL (ref 65–100)
NT PRO BNP: 1015 PG/ML
POTASSIUM SERPL-SCNC: 4 MMOL/L (ref 3.5–5.1)
SODIUM SERPL-SCNC: 140 MMOL/L (ref 136–146)
TSH, 3RD GENERATION: 0.83 UIU/ML (ref 0.36–3.74)

## 2024-02-28 ENCOUNTER — TELEPHONE (OUTPATIENT)
Dept: INTERNAL MEDICINE CLINIC | Facility: CLINIC | Age: 89
End: 2024-02-28

## 2024-02-28 ENCOUNTER — ANTI-COAG VISIT (OUTPATIENT)
Dept: INTERNAL MEDICINE CLINIC | Facility: CLINIC | Age: 89
End: 2024-02-28

## 2024-02-28 DIAGNOSIS — G62.9 NEUROPATHY: Primary | ICD-10-CM

## 2024-02-28 DIAGNOSIS — I48.21 PERMANENT ATRIAL FIBRILLATION (HCC): Primary | ICD-10-CM

## 2024-02-28 DIAGNOSIS — Z79.01 ANTICOAGULANT LONG-TERM USE: ICD-10-CM

## 2024-02-28 LAB — INR BLD: 1.7

## 2024-02-28 NOTE — PROGRESS NOTES
Anticoagulation Summary  As of 2024      INR goal:  2.0-3.0   TTR:  73.0 % (1.7 y)   INR used for dosin.70 (2024)   Warfarin maintenance plan:  2.5 mg (5 mg x 0.5) every Thu, Fri; 5 mg (5 mg x 1) all other days   Weekly warfarin total:  30 mg   Plan last modified:  Drew Kovacs MD (2024)   Next INR check:  3/6/2024   Target end date:  Indefinite    Indications    Permanent atrial fibrillation (HCC) [I48.21]  Anticoagulant long-term use [Z79.01]                 Anticoagulation Episode Summary       INR check location:  Home Draw    Preferred lab:      Send INR reminders to:      Comments:            Anticoagulation Care Providers       Provider Role Specialty Phone number    Drew Kovacs MD [facility] Internal Medicine [phone]

## 2024-02-28 NOTE — TELEPHONE ENCOUNTER
Pt c/o having numbness in the feet. Both feet stay cold and she has pain on the top of the feet as well.  She was told she had neuropathy and she is wanting to see if CMS could prescribe something to help her.       Pt would like a rx sent in to [facility].

## 2024-02-29 RX ORDER — GABAPENTIN 100 MG/1
100 CAPSULE ORAL 2 TIMES DAILY
Qty: 180 CAPSULE | Refills: 1 | Status: SHIPPED | OUTPATIENT
Start: 2024-02-29 | End: 2024-08-27

## 2024-03-06 ENCOUNTER — ANTI-COAG VISIT (OUTPATIENT)
Dept: INTERNAL MEDICINE CLINIC | Facility: CLINIC | Age: 89
End: 2024-03-06

## 2024-03-06 DIAGNOSIS — I48.21 PERMANENT ATRIAL FIBRILLATION (HCC): Primary | ICD-10-CM

## 2024-03-06 DIAGNOSIS — Z79.01 ANTICOAGULANT LONG-TERM USE: ICD-10-CM

## 2024-03-06 LAB — INR BLD: 3

## 2024-03-06 NOTE — PROGRESS NOTES
Anticoagulation Summary  As of 3/6/2024      INR goal:  2.0-3.0   TTR:  73.0 % (1.8 y)   INR used for dosing:  3.00 (3/6/2024)   Warfarin maintenance plan:  2.5 mg (5 mg x 0.5) every Fri; 5 mg (5 mg x 1) all other days   Weekly warfarin total:  32.5 mg   Plan last modified:  Drew Kovacs MD (2/28/2024)   Next INR check:  3/13/2024   Target end date:  Indefinite    Indications    Permanent atrial fibrillation (HCC) [I48.21]  Anticoagulant long-term use [Z79.01]                 Anticoagulation Episode Summary       INR check location:  Home Draw    Preferred lab:      Send INR reminders to:      Comments:            Anticoagulation Care Providers       Provider Role Specialty Phone number    Drew Kovacs MD [facility] Internal Medicine [phone]

## 2024-03-13 ENCOUNTER — ANTI-COAG VISIT (OUTPATIENT)
Dept: INTERNAL MEDICINE CLINIC | Facility: CLINIC | Age: 89
End: 2024-03-13

## 2024-03-13 DIAGNOSIS — I48.21 PERMANENT ATRIAL FIBRILLATION (HCC): Primary | ICD-10-CM

## 2024-03-13 DIAGNOSIS — Z79.01 ANTICOAGULANT LONG-TERM USE: ICD-10-CM

## 2024-03-13 LAB — INR BLD: 2.7

## 2024-03-13 NOTE — PROGRESS NOTES
Anticoagulation Summary  As of 3/13/2024      INR goal:  2.0-3.0   TTR:  73.3 % (1.8 y)   INR used for dosin.70 (3/13/2024)   Warfarin maintenance plan:  2.5 mg (5 mg x 0.5) every Fri; 5 mg (5 mg x 1) all other days   Weekly warfarin total:  32.5 mg   Plan last modified:  Drew Kovacs MD (2024)   Next INR check:  3/20/2024   Target end date:  Indefinite    Indications    Permanent atrial fibrillation (HCC) [I48.21]  Anticoagulant long-term use [Z79.01]                 Anticoagulation Episode Summary       INR check location:  Home Draw    Preferred lab:      Send INR reminders to:      Comments:            Anticoagulation Care Providers       Provider Role Specialty Phone number    Drew Kovacs MD [facility] Internal Medicine [phone]

## 2024-03-20 ENCOUNTER — ANTI-COAG VISIT (OUTPATIENT)
Dept: INTERNAL MEDICINE CLINIC | Facility: CLINIC | Age: 89
End: 2024-03-20

## 2024-03-20 DIAGNOSIS — Z79.01 ANTICOAGULANT LONG-TERM USE: ICD-10-CM

## 2024-03-20 DIAGNOSIS — I48.21 PERMANENT ATRIAL FIBRILLATION (HCC): Primary | ICD-10-CM

## 2024-03-20 LAB — INR BLD: 3.4

## 2024-03-20 NOTE — PROGRESS NOTES
Anticoagulation Summary  As of 3/20/2024      INR goal:  2.0-3.0   TTR:  73.0 % (1.8 y)   INR used for dosing:  3.40 (3/20/2024)   Warfarin maintenance plan:  2.5 mg (5 mg x 0.5) every Fri; 5 mg (5 mg x 1) all other days   Weekly warfarin total:  32.5 mg   Plan last modified:  Drew Kovacs MD (2/28/2024)   Next INR check:  3/27/2024   Target end date:  Indefinite    Indications    Permanent atrial fibrillation (HCC) [I48.21]  Anticoagulant long-term use [Z79.01]                 Anticoagulation Episode Summary       INR check location:  Home Draw    Preferred lab:      Send INR reminders to:      Comments:            Anticoagulation Care Providers       Provider Role Specialty Phone number    Drew Kovacs MD [facility] Internal Medicine [phone]

## 2024-03-25 ENCOUNTER — TELEPHONE (OUTPATIENT)
Dept: INTERNAL MEDICINE CLINIC | Facility: CLINIC | Age: 89
End: 2024-03-25

## 2024-03-25 NOTE — TELEPHONE ENCOUNTER
----- Message from Sandra Maldonado sent at 3/25/2024  3:17 PM EDT -----  Subject: Medication Problem     Medication: gabapentin (NEURONTIN) 100 MG capsule  Dosage: 100 MG 1 time daily  Ordering Provider: Drew Kovacs    Question/Problem: Patient state she can only take 1 100MG per day. Patient   states she is ok taking PM but patient states she can not take 2 tablets a   day due to head not feeling right . Please Advise      Pharmacy: [facility] - [facility] [address]   MAYCO LEE - P [phone] - F [phone]    ---------------------------------------------------------------------------  --------------  CALL BACK INFO  [phone]; OK to leave message on voicemail  ---------------------------------------------------------------------------  --------------    SCRIPT ANSWERS  Relationship to Patient: Self

## 2024-03-25 NOTE — TELEPHONE ENCOUNTER
Called pt, she is unable to take the Gabapentin twice a day.  If she take the medication during the day it makes her head feel really heavy and she feel like her balance is off.  But when she take the medication at night right before bed she doesn't feel bad.    The feet is still tingling but its not as bad as it was.

## 2024-03-27 ENCOUNTER — ANTI-COAG VISIT (OUTPATIENT)
Dept: INTERNAL MEDICINE CLINIC | Facility: CLINIC | Age: 89
End: 2024-03-27

## 2024-03-27 DIAGNOSIS — Z79.01 ANTICOAGULANT LONG-TERM USE: ICD-10-CM

## 2024-03-27 DIAGNOSIS — I48.21 PERMANENT ATRIAL FIBRILLATION (HCC): Primary | ICD-10-CM

## 2024-03-27 LAB — INR BLD: 2.5

## 2024-03-27 NOTE — PROGRESS NOTES
Anticoagulation Summary  As of 3/27/2024      INR goal:  2.0-3.0   TTR:  72.8 % (1.8 y)   INR used for dosin.50 (3/27/2024)   Warfarin maintenance plan:  2.5 mg (5 mg x 0.5) every Wed, Fri; 5 mg (5 mg x 1) all other days   Weekly warfarin total:  30 mg   Plan last modified:  Drew Kovacs MD (3/20/2024)   Next INR check:  4/3/2024   Target end date:  Indefinite    Indications    Permanent atrial fibrillation (HCC) [I48.21]  Anticoagulant long-term use [Z79.01]                 Anticoagulation Episode Summary       INR check location:  Home Draw    Preferred lab:      Send INR reminders to:      Comments:            Anticoagulation Care Providers       Provider Role Specialty Phone number    Drew Kovacs MD [facility] Internal Medicine [phone]

## 2024-04-03 LAB — INR BLD: 2.8

## 2024-04-05 ENCOUNTER — ANTI-COAG VISIT (OUTPATIENT)
Dept: INTERNAL MEDICINE CLINIC | Facility: CLINIC | Age: 89
End: 2024-04-05

## 2024-04-05 DIAGNOSIS — Z79.01 ANTICOAGULANT LONG-TERM USE: ICD-10-CM

## 2024-04-05 DIAGNOSIS — I48.21 PERMANENT ATRIAL FIBRILLATION (HCC): Primary | ICD-10-CM

## 2024-04-05 NOTE — PROGRESS NOTES
Anticoagulation Summary  As of 2024      INR goal:  2.0-3.0   TTR:  73.1 % (1.8 y)   INR used for dosin.80 (4/3/2024)   Warfarin maintenance plan:  2.5 mg (5 mg x 0.5) every Wed, Fri; 5 mg (5 mg x 1) all other days   Weekly warfarin total:  30 mg   Plan last modified:  Drew Kovacs MD (3/20/2024)   Next INR check:  4/10/2024   Target end date:  Indefinite    Indications    Permanent atrial fibrillation (HCC) [I48.21]  Anticoagulant long-term use [Z79.01]                 Anticoagulation Episode Summary       INR check location:  Home Draw    Preferred lab:      Send INR reminders to:      Comments:            Anticoagulation Care Providers       Provider Role Specialty Phone number    Drew Kovacs MD [facility] Internal Medicine [phone]

## 2024-04-10 ENCOUNTER — ANTI-COAG VISIT (OUTPATIENT)
Dept: INTERNAL MEDICINE CLINIC | Facility: CLINIC | Age: 89
End: 2024-04-10

## 2024-04-10 DIAGNOSIS — Z79.01 ANTICOAGULANT LONG-TERM USE: ICD-10-CM

## 2024-04-10 DIAGNOSIS — I48.21 PERMANENT ATRIAL FIBRILLATION (HCC): Primary | ICD-10-CM

## 2024-04-10 LAB — INR BLD: 2

## 2024-04-10 NOTE — PROGRESS NOTES
Anticoagulation Summary  As of 4/10/2024      INR goal:  2.0-3.0   TTR:  73.3 % (1.8 y)   INR used for dosin.00 (4/10/2024)   Warfarin maintenance plan:  2.5 mg (5 mg x 0.5) every Wed, Fri; 5 mg (5 mg x 1) all other days   Weekly warfarin total:  30 mg   Plan last modified:  Drew Kovacs MD (3/20/2024)   Next INR check:  2024   Target end date:  Indefinite    Indications    Permanent atrial fibrillation (HCC) [I48.21]  Anticoagulant long-term use [Z79.01]                 Anticoagulation Episode Summary       INR check location:  Home Draw    Preferred lab:      Send INR reminders to:      Comments:            Anticoagulation Care Providers       Provider Role Specialty Phone number    Drew Kovacs MD [facility] Internal Medicine [phone]

## 2024-04-12 ENCOUNTER — NURSE ONLY (OUTPATIENT)
Age: 89
End: 2024-04-12

## 2024-04-12 DIAGNOSIS — I49.5 TACHY-BRADY SYNDROME (HCC): Primary | ICD-10-CM

## 2024-04-17 LAB — INR BLD: 1.8

## 2024-04-18 ENCOUNTER — ANTI-COAG VISIT (OUTPATIENT)
Dept: INTERNAL MEDICINE CLINIC | Facility: CLINIC | Age: 89
End: 2024-04-18

## 2024-04-18 DIAGNOSIS — Z79.01 ANTICOAGULANT LONG-TERM USE: ICD-10-CM

## 2024-04-18 DIAGNOSIS — I48.21 PERMANENT ATRIAL FIBRILLATION (HCC): Primary | ICD-10-CM

## 2024-04-18 RX ORDER — DIGOXIN 125 MCG
TABLET ORAL
Qty: 65 TABLET | Refills: 3 | Status: SHIPPED | OUTPATIENT
Start: 2024-04-18

## 2024-04-18 NOTE — PROGRESS NOTES
Anticoagulation Summary  As of 2024      INR goal:  2.0-3.0   TTR:  72.6 % (1.9 y)   INR used for dosin.80 (2024)   Warfarin maintenance plan:  2.5 mg (5 mg x 0.5) every Wed, Fri; 5 mg (5 mg x 1) all other days   Weekly warfarin total:  30 mg   Plan last modified:  Drew Kovacs MD (3/20/2024)   Next INR check:  2024   Target end date:  Indefinite    Indications    Permanent atrial fibrillation (HCC) [I48.21]  Anticoagulant long-term use [Z79.01]                 Anticoagulation Episode Summary       INR check location:  Home Draw    Preferred lab:      Send INR reminders to:      Comments:            Anticoagulation Care Providers       Provider Role Specialty Phone number    Drew Kovacs MD [facility] Internal Medicine [phone]          Per GH have pt take 5 mg on Friday and then resume back to taking 2.5 mg every Wed, Fri and 5 mg all other days.

## 2024-04-24 LAB — INR BLD: 2.2

## 2024-04-25 ENCOUNTER — ANTI-COAG VISIT (OUTPATIENT)
Dept: INTERNAL MEDICINE CLINIC | Facility: CLINIC | Age: 89
End: 2024-04-25

## 2024-04-25 DIAGNOSIS — Z79.01 ANTICOAGULANT LONG-TERM USE: ICD-10-CM

## 2024-04-25 DIAGNOSIS — I48.21 PERMANENT ATRIAL FIBRILLATION (HCC): Primary | ICD-10-CM

## 2024-04-25 NOTE — PROGRESS NOTES
Anticoagulation Summary  As of 2024      INR goal:  2.0-3.0   TTR:  72.4 % (1.9 y)   INR used for dosin.20 (2024)   Warfarin maintenance plan:  2.5 mg (5 mg x 0.5) every Wed, Fri; 5 mg (5 mg x 1) all other days   Weekly warfarin total:  30 mg   Plan last modified:  Drew Kovacs MD (3/20/2024)   Next INR check:  2024   Target end date:  Indefinite    Indications    Permanent atrial fibrillation (HCC) [I48.21]  Anticoagulant long-term use [Z79.01]                 Anticoagulation Episode Summary       INR check location:  Home Draw    Preferred lab:      Send INR reminders to:      Comments:            Anticoagulation Care Providers       Provider Role Specialty Phone number    Drew Kovacs MD [facility] Internal Medicine [phone]

## 2024-05-01 ENCOUNTER — ANTI-COAG VISIT (OUTPATIENT)
Dept: INTERNAL MEDICINE CLINIC | Facility: CLINIC | Age: 89
End: 2024-05-01

## 2024-05-01 DIAGNOSIS — Z79.01 ANTICOAGULANT LONG-TERM USE: ICD-10-CM

## 2024-05-01 DIAGNOSIS — I48.21 PERMANENT ATRIAL FIBRILLATION (HCC): Primary | ICD-10-CM

## 2024-05-01 LAB — INR BLD: 2.1

## 2024-05-01 NOTE — PROGRESS NOTES
Anticoagulation Summary  As of 2024      INR goal:  2.0-3.0   TTR:  72.6 % (1.9 y)   INR used for dosin.10 (2024)   Warfarin maintenance plan:  2.5 mg (5 mg x 0.5) every Wed, Fri; 5 mg (5 mg x 1) all other days   Weekly warfarin total:  30 mg   Plan last modified:  Drew Kovacs MD (3/20/2024)   Next INR check:  2024   Target end date:  Indefinite    Indications    Permanent atrial fibrillation (HCC) [I48.21]  Anticoagulant long-term use [Z79.01]                 Anticoagulation Episode Summary       INR check location:  Home Draw    Preferred lab:      Send INR reminders to:      Comments:            Anticoagulation Care Providers       Provider Role Specialty Phone number    Drew Kovacs MD [facility] Internal Medicine [phone]

## 2024-05-07 ENCOUNTER — OFFICE VISIT (OUTPATIENT)
Age: 89
End: 2024-05-07
Payer: MEDICARE

## 2024-05-07 VITALS
WEIGHT: 147 LBS | DIASTOLIC BLOOD PRESSURE: 64 MMHG | HEART RATE: 72 BPM | SYSTOLIC BLOOD PRESSURE: 122 MMHG | BODY MASS INDEX: 21.77 KG/M2 | HEIGHT: 69 IN

## 2024-05-07 DIAGNOSIS — I48.21 PERMANENT ATRIAL FIBRILLATION (HCC): Primary | ICD-10-CM

## 2024-05-07 DIAGNOSIS — I49.5 SICK SINUS SYNDROME (HCC): ICD-10-CM

## 2024-05-07 DIAGNOSIS — Z98.890 S/P MITRAL VALVE REPAIR: ICD-10-CM

## 2024-05-07 PROCEDURE — 93000 ELECTROCARDIOGRAM COMPLETE: CPT | Performed by: INTERNAL MEDICINE

## 2024-05-07 PROCEDURE — G8427 DOCREV CUR MEDS BY ELIG CLIN: HCPCS | Performed by: INTERNAL MEDICINE

## 2024-05-07 PROCEDURE — 99214 OFFICE O/P EST MOD 30 MIN: CPT | Performed by: INTERNAL MEDICINE

## 2024-05-07 PROCEDURE — 1123F ACP DISCUSS/DSCN MKR DOCD: CPT | Performed by: INTERNAL MEDICINE

## 2024-05-07 PROCEDURE — 1090F PRES/ABSN URINE INCON ASSESS: CPT | Performed by: INTERNAL MEDICINE

## 2024-05-07 PROCEDURE — 1036F TOBACCO NON-USER: CPT | Performed by: INTERNAL MEDICINE

## 2024-05-07 PROCEDURE — G8420 CALC BMI NORM PARAMETERS: HCPCS | Performed by: INTERNAL MEDICINE

## 2024-05-07 NOTE — PROGRESS NOTES
[facility] CARDIOLOGY  [address] [place] [address], [facility] [address]  [place] [address]  PHONE: [phone]      24    NAME:  Lynette Park  : 1935  MRN: [id number]       SUBJECTIVE:   Lynette Park is a 88 y.o. female seen for a follow up visit regarding the following:     Chief Complaint   Patient presents with    Atrial Fibrillation         HPI:    No cp. No orthopnea or pnd. No palpitations or syncope.  Some myers at extremes.    Past Medical History, Past Surgical History, Family history, Social History, and Medications were all reviewed with the patient today and updated as necessary.     Current Outpatient Medications   Medication Sig Dispense Refill    digoxin (LANOXIN) 125 MCG tablet TAKE 1 TABLET DAILY EXCEPT ON MONDAY AND  65 tablet 3    gabapentin (NEURONTIN) 100 MG capsule Take 1 capsule by mouth 2 times daily for 180 days. Intended supply: 90 days (Patient taking differently: Take 1 capsule by mouth nightly. Intended supply: 90 days) 180 capsule 1    diphenhydrAMINE (ALLERGY RELIEF) 25 MG tablet Take 1 tablet by mouth at bedtime      vitamin C (ASCORBIC ACID) 500 MG tablet Take 1 tablet by mouth daily      vitamin D (VITAMIN D3) 50 MCG ( UT) CAPS capsule Take 1 capsule by mouth daily      warfarin (COUMADIN) 5 MG tablet TAKE 1 TABLET EVERY  tablet 3    dilTIAZem (CARDIZEM CD) 240 MG extended release capsule TAKE 1 CAPSULE EVERY DAY 90 capsule 10    levothyroxine (SYNTHROID) 100 MCG tablet TAKE 1 TABLET EVERY MORNING BEFORE BREAKFAST 90 tablet 3    polyethylene glycol (GLYCOLAX) 17 GM/SCOOP powder Take 17 g by mouth daily      sodium chloride (MARAL 128) 2 % ophthalmic solution Apply 1 drop to eye 2 times daily       No current facility-administered medications for this visit.               Social History     Tobacco Use    Smoking status: Never    Smokeless tobacco: Never   Substance Use Topics    Alcohol use: No              PHYSICAL EXAM:   /64   Pulse 72   Ht

## 2024-05-08 ENCOUNTER — ANTI-COAG VISIT (OUTPATIENT)
Dept: INTERNAL MEDICINE CLINIC | Facility: CLINIC | Age: 89
End: 2024-05-08

## 2024-05-08 DIAGNOSIS — I48.21 PERMANENT ATRIAL FIBRILLATION (HCC): Primary | ICD-10-CM

## 2024-05-08 DIAGNOSIS — Z79.01 ANTICOAGULANT LONG-TERM USE: ICD-10-CM

## 2024-05-08 LAB — INR BLD: 2.2

## 2024-05-08 NOTE — PROGRESS NOTES
Anticoagulation Summary  As of 2024      INR goal:  2.0-3.0   TTR:  72.9 % (1.9 y)   INR used for dosin.20 (2024)   Warfarin maintenance plan:  2.5 mg (5 mg x 0.5) every Wed, Fri; 5 mg (5 mg x 1) all other days   Weekly warfarin total:  30 mg   Plan last modified:  Drew Kovacs MD (3/20/2024)   Next INR check:  5/15/2024   Target end date:  Indefinite    Indications    Permanent atrial fibrillation (HCC) [I48.21]  Anticoagulant long-term use [Z79.01]                 Anticoagulation Episode Summary       INR check location:  Home Draw    Preferred lab:      Send INR reminders to:      Comments:            Anticoagulation Care Providers       Provider Role Specialty Phone number    Drew Kovacs MD [facility] Internal Medicine [phone]

## 2024-05-15 ENCOUNTER — ANTI-COAG VISIT (OUTPATIENT)
Dept: INTERNAL MEDICINE CLINIC | Facility: CLINIC | Age: 89
End: 2024-05-15

## 2024-05-15 DIAGNOSIS — I48.21 PERMANENT ATRIAL FIBRILLATION (HCC): Primary | ICD-10-CM

## 2024-05-15 DIAGNOSIS — Z79.01 ANTICOAGULANT LONG-TERM USE: ICD-10-CM

## 2024-05-15 LAB — INR BLD: 1.9

## 2024-05-15 NOTE — PROGRESS NOTES
Anticoagulation Summary  As of 5/15/2024      INR goal:  2.0-3.0   TTR:  72.8 % (1.9 y)   INR used for dosin.90 (5/15/2024)   Warfarin maintenance plan:  2.5 mg (5 mg x 0.5) every Wed, Fri; 5 mg (5 mg x 1) all other days   Weekly warfarin total:  30 mg   Plan last modified:  Drew Kovacs MD (3/20/2024)   Next INR check:  2024   Target end date:  Indefinite    Indications    Permanent atrial fibrillation (HCC) [I48.21]  Anticoagulant long-term use [Z79.01]                 Anticoagulation Episode Summary       INR check location:  Home Draw    Preferred lab:      Send INR reminders to:      Comments:            Anticoagulation Care Providers       Provider Role Specialty Phone number    Drew Kovacs MD [facility] Internal Medicine [phone]

## 2024-05-29 ENCOUNTER — ANTI-COAG VISIT (OUTPATIENT)
Dept: INTERNAL MEDICINE CLINIC | Facility: CLINIC | Age: 89
End: 2024-05-29

## 2024-05-29 DIAGNOSIS — Z79.01 ANTICOAGULANT LONG-TERM USE: ICD-10-CM

## 2024-05-29 DIAGNOSIS — I48.21 PERMANENT ATRIAL FIBRILLATION (HCC): Primary | ICD-10-CM

## 2024-05-29 LAB — INR BLD: 1.7

## 2024-05-29 NOTE — PROGRESS NOTES
Anticoagulation Summary  As of 2024      INR goal:  2.0-3.0   TTR:  71.4 % (2 y)   INR used for dosin.70 (2024)   Warfarin maintenance plan:  2.5 mg (5 mg x 0.5) every Wed, Fri; 5 mg (5 mg x 1) all other days   Weekly warfarin total:  30 mg   Plan last modified:  Drew Kovacs MD (3/20/2024)   Next INR check:  2024   Target end date:  Indefinite    Indications    Permanent atrial fibrillation (HCC) [I48.21]  Anticoagulant long-term use [Z79.01]                 Anticoagulation Episode Summary       INR check location:  Home Draw    Preferred lab:      Send INR reminders to:      Comments:            Anticoagulation Care Providers       Provider Role Specialty Phone number    Drew Kovacs MD [facility] Internal Medicine [phone]          The medication changes has been fully explained to the patient, who indicates understanding.

## 2024-06-05 ENCOUNTER — ANTI-COAG VISIT (OUTPATIENT)
Dept: INTERNAL MEDICINE CLINIC | Facility: CLINIC | Age: 89
End: 2024-06-05

## 2024-06-05 DIAGNOSIS — Z79.01 ANTICOAGULANT LONG-TERM USE: ICD-10-CM

## 2024-06-05 DIAGNOSIS — I48.21 PERMANENT ATRIAL FIBRILLATION (HCC): Primary | ICD-10-CM

## 2024-06-05 LAB — INR BLD: 2

## 2024-06-05 NOTE — PROGRESS NOTES
Anticoagulation Summary  As of 2024      INR goal:  2.0-3.0   TTR:  70.8 % (2 y)   INR used for dosin.00 (2024)   Warfarin maintenance plan:  2.5 mg (5 mg x 0.5) every Fri; 5 mg (5 mg x 1) all other days   Weekly warfarin total:  32.5 mg   Plan last modified:  Drew Kovacs MD (2024)   Next INR check:  2024   Target end date:  Indefinite    Indications    Permanent atrial fibrillation (HCC) [I48.21]  Anticoagulant long-term use [Z79.01]                 Anticoagulation Episode Summary       INR check location:  Home Draw    Preferred lab:      Send INR reminders to:      Comments:            Anticoagulation Care Providers       Provider Role Specialty Phone number    Drew Kovacs MD [facility] Internal Medicine [phone]

## 2024-06-12 ENCOUNTER — ANTI-COAG VISIT (OUTPATIENT)
Dept: INTERNAL MEDICINE CLINIC | Facility: CLINIC | Age: 89
End: 2024-06-12

## 2024-06-12 ENCOUNTER — TELEPHONE (OUTPATIENT)
Dept: INTERNAL MEDICINE CLINIC | Facility: CLINIC | Age: 89
End: 2024-06-12

## 2024-06-12 DIAGNOSIS — I48.21 PERMANENT ATRIAL FIBRILLATION (HCC): Primary | ICD-10-CM

## 2024-06-12 DIAGNOSIS — Z79.01 ANTICOAGULANT LONG-TERM USE: ICD-10-CM

## 2024-06-12 LAB — INR BLD: 2.5

## 2024-06-12 NOTE — TELEPHONE ENCOUNTER
Ms. Park has called and her big toe on her right foot is infected, and ingrown toe nail.   She has been soaking it in salt water and it is red at times. She can't get into see her podiatrist until August. She is wanting to know if she can get an antibiotic or is there something that she can get over the counter to use for it?   She wants a call back from [place].    normal gait and station , full range of motion

## 2024-06-12 NOTE — TELEPHONE ENCOUNTER
Pt will need to be seen to have the toenail looked at and possibly removed.  Avis has availability for tomorrow and Friday.    Please call and schedule

## 2024-06-12 NOTE — PROGRESS NOTES
Anticoagulation Summary  As of 2024      INR goal:  2.0-3.0   TTR:  71.0 % (2 y)   INR used for dosin.50 (2024)   Warfarin maintenance plan:  2.5 mg (5 mg x 0.5) every Fri; 5 mg (5 mg x 1) all other days   Weekly warfarin total:  32.5 mg   Plan last modified:  Drew Kovacs MD (2024)   Next INR check:  2024   Target end date:  Indefinite    Indications    Permanent atrial fibrillation (HCC) [I48.21]  Anticoagulant long-term use [Z79.01]                 Anticoagulation Episode Summary       INR check location:  Home Draw    Preferred lab:      Send INR reminders to:      Comments:            Anticoagulation Care Providers       Provider Role Specialty Phone number    rDew Kovacs MD [facility] Internal Medicine [phone]

## 2024-06-13 PROCEDURE — 93296 REM INTERROG EVL PM/IDS: CPT | Performed by: INTERNAL MEDICINE

## 2024-06-13 PROCEDURE — 93294 REM INTERROG EVL PM/LDLS PM: CPT | Performed by: INTERNAL MEDICINE

## 2024-06-14 ENCOUNTER — OFFICE VISIT (OUTPATIENT)
Dept: INTERNAL MEDICINE CLINIC | Facility: CLINIC | Age: 89
End: 2024-06-14
Payer: MEDICARE

## 2024-06-14 VITALS
OXYGEN SATURATION: 97 % | SYSTOLIC BLOOD PRESSURE: 165 MMHG | BODY MASS INDEX: 21.8 KG/M2 | WEIGHT: 147.2 LBS | HEART RATE: 64 BPM | DIASTOLIC BLOOD PRESSURE: 60 MMHG | HEIGHT: 69 IN | TEMPERATURE: 98.4 F

## 2024-06-14 DIAGNOSIS — G62.9 NEUROPATHY: ICD-10-CM

## 2024-06-14 DIAGNOSIS — B35.1 ONYCHOMYCOSIS: ICD-10-CM

## 2024-06-14 DIAGNOSIS — L84 CALLUS BETWEEN TOES: ICD-10-CM

## 2024-06-14 DIAGNOSIS — M79.674 GREAT TOE PAIN, RIGHT: Primary | ICD-10-CM

## 2024-06-14 PROCEDURE — 99203 OFFICE O/P NEW LOW 30 MIN: CPT | Performed by: NURSE PRACTITIONER

## 2024-06-14 PROCEDURE — 1123F ACP DISCUSS/DSCN MKR DOCD: CPT | Performed by: NURSE PRACTITIONER

## 2024-06-14 ASSESSMENT — ENCOUNTER SYMPTOMS
ABDOMINAL PAIN: 0
CHEST TIGHTNESS: 0
DIARRHEA: 0
ABDOMINAL DISTENTION: 0
CONSTIPATION: 0
VOMITING: 0
SHORTNESS OF BREATH: 0
NAUSEA: 0
COUGH: 0

## 2024-06-14 NOTE — PROGRESS NOTES
[facility] Internal Medicine  [address] [facility] [address]     Office Visit    Lynette Park   1935 06/14/24       Subjective:     Chief Complaint   Patient presents with    Toe Pain     Pt reports pain of right great toe. It has been on and off for a few months.         History of Present illness:  Ms. Park is a 88 y.o. female  who presents for the above complaints. She feels her right great toe is infected bc it was turning red. Redness relieves when she sits down. She states her toe is not hurting at this time. She has a podiatry appt for August 1st. She is unsure where she has an appt at.  Onychomycosis to great toenail, she has been using some drops. When she has pain it's located mostly on bottom of great toe and in between toes. No ingrown toenail noted. She has callus to great toe that is TTP.      She states she has pain with wearing her compression socks.  She does not have any open toed compression socks.  She requested toes to be trimmed off to relieve pressure.    Known h/o neuropathy. She states she can only take gabapentin at night bc it makes her so sleepy and numb.       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     Anticoagulant long-term use     Coumadin    Atrial fibrillation ([facility]) 01/2009    Benign paroxysmal vertigo of both ears 3/2/2016    Chest pain 09/2009    Congestive heart failure (CHF) ([facility]) 12/05/2007    COVID-19 vaccine series completed 03/04/2021    Moderna    Dizzy spells     per pt    Dry eyes     Dyspnea 01/2009    Heart failure ([facility])     Heart murmur 2016    noted 2016    Hypothyroidism     Malaise and fatigue 01/2009    Mitral valve insufficiency 01/2009    Presence of cardiac pacemaker 03/02/2016    [place] MRI safe dual

## 2024-06-19 LAB — INR BLD: 2

## 2024-06-20 ENCOUNTER — ANTI-COAG VISIT (OUTPATIENT)
Dept: INTERNAL MEDICINE CLINIC | Facility: CLINIC | Age: 89
End: 2024-06-20

## 2024-06-20 DIAGNOSIS — I48.21 PERMANENT ATRIAL FIBRILLATION (HCC): Primary | ICD-10-CM

## 2024-06-20 DIAGNOSIS — Z79.01 ANTICOAGULANT LONG-TERM USE: ICD-10-CM

## 2024-06-20 NOTE — PROGRESS NOTES
Anticoagulation Summary  As of 2024      INR goal:  2.0-3.0   TTR:  71.3 % (2 y)   INR used for dosin.00 (2024)   Warfarin maintenance plan:  2.5 mg (5 mg x 0.5) every Fri; 5 mg (5 mg x 1) all other days   Weekly warfarin total:  32.5 mg   Plan last modified:  Drew Kovacs MD (2024)   Next INR check:  2024   Target end date:  Indefinite    Indications    Permanent atrial fibrillation (HCC) [I48.21]  Anticoagulant long-term use [Z79.01]                 Anticoagulation Episode Summary       INR check location:  Home Draw    Preferred lab:      Send INR reminders to:      Comments:            Anticoagulation Care Providers       Provider Role Specialty Phone number    Drew Kovacs MD [facility] Internal Medicine [phone]

## 2024-06-26 ENCOUNTER — ANTI-COAG VISIT (OUTPATIENT)
Dept: INTERNAL MEDICINE CLINIC | Facility: CLINIC | Age: 89
End: 2024-06-26
Payer: MEDICARE

## 2024-06-26 DIAGNOSIS — I48.21 PERMANENT ATRIAL FIBRILLATION (HCC): Primary | ICD-10-CM

## 2024-06-26 DIAGNOSIS — Z79.01 ANTICOAGULANT LONG-TERM USE: ICD-10-CM

## 2024-06-26 LAB — INR BLD: 1.7

## 2024-06-26 PROCEDURE — 99421 OL DIG E/M SVC 5-10 MIN: CPT | Performed by: NURSE PRACTITIONER

## 2024-06-26 NOTE — PROGRESS NOTES
Anticoagulation Summary  As of 2024      INR goal:  2.0-3.0   TTR:  70.6 % (2.1 y)   INR used for dosin.70 (2024)   Warfarin maintenance plan:  2.5 mg (5 mg x 0.5) every Fri; 5 mg (5 mg x 1) all other days   Weekly warfarin total:  32.5 mg   Plan last modified:  Drew Kovacs MD (2024)   Next INR check:  7/3/2024   Target end date:  Indefinite    Indications    Permanent atrial fibrillation (HCC) [I48.21]  Anticoagulant long-term use [Z79.01]                 Anticoagulation Episode Summary       INR check location:  Home Draw    Preferred lab:      Send INR reminders to:      Comments:            Anticoagulation Care Providers       Provider Role Specialty Phone number    Drew Kovacs MD [facility] Internal Medicine [phone]

## 2024-06-26 NOTE — PROGRESS NOTES
5-10 minutes were spent on the digital evaluation and management of this patient.     Increased Warfarin this Friday only to 5mg and she will resume same schedule thereafter. She admits to understanding. She has not been eating > amts of greens (vit K). Recheck as scheduled.     1. Permanent atrial fibrillation (HCC)  2. Anticoagulant long-term use  Friday take 1 tab- then resume previous schedule

## 2024-07-03 ENCOUNTER — ANTI-COAG VISIT (OUTPATIENT)
Dept: INTERNAL MEDICINE CLINIC | Facility: CLINIC | Age: 89
End: 2024-07-03

## 2024-07-03 DIAGNOSIS — Z79.01 ANTICOAGULANT LONG-TERM USE: ICD-10-CM

## 2024-07-03 DIAGNOSIS — I48.21 PERMANENT ATRIAL FIBRILLATION (HCC): Primary | ICD-10-CM

## 2024-07-03 LAB — INR BLD: 2.2

## 2024-07-03 NOTE — PROGRESS NOTES
Anticoagulation Summary  As of 7/3/2024      INR goal:  2.0-3.0   TTR:  70.4 % (2.1 y)   INR used for dosin.20 (7/3/2024)   Warfarin maintenance plan:  2.5 mg (5 mg x 0.5) every Fri; 5 mg (5 mg x 1) all other days   Weekly warfarin total:  32.5 mg   Plan last modified:  Drew Kovacs MD (2024)   Next INR check:  7/10/2024   Target end date:  Indefinite    Indications    Permanent atrial fibrillation (HCC) [I48.21]  Anticoagulant long-term use [Z79.01]                 Anticoagulation Episode Summary       INR check location:  Home Draw    Preferred lab:      Send INR reminders to:      Comments:            Anticoagulation Care Providers       Provider Role Specialty Phone number    Drew Kovacs MD [facility] Internal Medicine [phone]

## 2024-07-23 ENCOUNTER — ANTI-COAG VISIT (OUTPATIENT)
Dept: INTERNAL MEDICINE CLINIC | Facility: CLINIC | Age: 89
End: 2024-07-23

## 2024-07-23 DIAGNOSIS — Z79.01 ANTICOAGULANT LONG-TERM USE: ICD-10-CM

## 2024-07-23 DIAGNOSIS — I48.21 PERMANENT ATRIAL FIBRILLATION (HCC): Primary | ICD-10-CM

## 2024-07-23 LAB — INR BLD: 2.4

## 2024-07-23 NOTE — PROGRESS NOTES
Anticoagulation Summary  As of 2024      INR goal:  2.0-3.0   TTR:  71.1 % (2.1 y)   INR used for dosin.40 (2024)   Warfarin maintenance plan:  2.5 mg (5 mg x 0.5) every Fri; 5 mg (5 mg x 1) all other days   Weekly warfarin total:  32.5 mg   Plan last modified:  Drew Kovacs MD (2024)   Next INR check:  2024   Target end date:  Indefinite    Indications    Permanent atrial fibrillation (HCC) [I48.21]  Anticoagulant long-term use [Z79.01]                 Anticoagulation Episode Summary       INR check location:  Home Draw    Preferred lab:      Send INR reminders to:      Comments:            Anticoagulation Care Providers       Provider Role Specialty Phone number    Drew Kovacs MD [facility] [phone]

## 2024-07-31 LAB — INR BLD: 1.9

## 2024-08-01 ENCOUNTER — ANTI-COAG VISIT (OUTPATIENT)
Dept: INTERNAL MEDICINE CLINIC | Facility: CLINIC | Age: 89
End: 2024-08-01

## 2024-08-01 DIAGNOSIS — I48.21 PERMANENT ATRIAL FIBRILLATION (HCC): Primary | ICD-10-CM

## 2024-08-01 DIAGNOSIS — Z79.01 ANTICOAGULANT LONG-TERM USE: ICD-10-CM

## 2024-08-01 NOTE — PROGRESS NOTES
Anticoagulation Summary  As of 2024      INR goal:  2.0-3.0   TTR:  71.2 % (2.2 y)   INR used for dosin.90 (2024)   Warfarin maintenance plan:  2.5 mg (5 mg x 0.5) every Fri; 5 mg (5 mg x 1) all other days   Weekly warfarin total:  32.5 mg   Plan last modified:  Drew Kovacs MD (2024)   Next INR check:  2024   Target end date:  Indefinite    Indications    Permanent atrial fibrillation (HCC) [I48.21]  Anticoagulant long-term use [Z79.01]                 Anticoagulation Episode Summary       INR check location:  Home Draw    Preferred lab:      Send INR reminders to:      Comments:            Anticoagulation Care Providers       Provider Role Specialty Phone number    Drew Kovacs MD [facility] Internal Medicine [phone]

## 2024-08-07 LAB — INR BLD: 2.3

## 2024-08-08 ENCOUNTER — ANTI-COAG VISIT (OUTPATIENT)
Dept: INTERNAL MEDICINE CLINIC | Facility: CLINIC | Age: 89
End: 2024-08-08

## 2024-08-08 DIAGNOSIS — I48.21 PERMANENT ATRIAL FIBRILLATION (HCC): Primary | ICD-10-CM

## 2024-08-08 DIAGNOSIS — Z79.01 ANTICOAGULANT LONG-TERM USE: ICD-10-CM

## 2024-08-08 NOTE — PROGRESS NOTES
Anticoagulation Summary  As of 2024      INR goal:  2.0-3.0   TTR:  71.2 % (2.2 y)   INR used for dosin.30 (2024)   Warfarin maintenance plan:  2.5 mg (5 mg x 0.5) every Fri; 5 mg (5 mg x 1) all other days   Weekly warfarin total:  32.5 mg   Plan last modified:  Drew Kovacs MD (2024)   Next INR check:  2024   Target end date:  Indefinite    Indications    Permanent atrial fibrillation (HCC) [I48.21]  Anticoagulant long-term use [Z79.01]                 Anticoagulation Episode Summary       INR check location:  Home Draw    Preferred lab:      Send INR reminders to:      Comments:            Anticoagulation Care Providers       Provider Role Specialty Phone number    Drew Kovacs MD [facility] Internal Medicine [phone]

## 2024-08-14 ENCOUNTER — ANTI-COAG VISIT (OUTPATIENT)
Dept: INTERNAL MEDICINE CLINIC | Facility: CLINIC | Age: 89
End: 2024-08-14

## 2024-08-14 DIAGNOSIS — I48.21 PERMANENT ATRIAL FIBRILLATION (HCC): Primary | ICD-10-CM

## 2024-08-14 DIAGNOSIS — Z79.01 ANTICOAGULANT LONG-TERM USE: ICD-10-CM

## 2024-08-14 LAB — INR BLD: 2.8

## 2024-08-14 NOTE — PROGRESS NOTES
Anticoagulation Summary  As of 2024      INR goal:  2.0-3.0   TTR:  71.5% (2.2 y)   INR used for dosin.80 (2024)   Warfarin maintenance plan:  2.5 mg (5 mg x 0.5) every Fri; 5 mg (5 mg x 1) all other days   Weekly warfarin total:  32.5 mg   Plan last modified:  Drew Kovacs MD (2024)   Next INR check:  2024   Target end date:  Indefinite    Indications    Permanent atrial fibrillation (HCC) [I48.21]  Anticoagulant long-term use [Z79.01]                 Anticoagulation Episode Summary       INR check location:  Home Draw    Preferred lab:  --    Send INR reminders to:  --    Comments:  --          Anticoagulation Care Providers       Provider Role Specialty Phone number    Drew Kovacs MD [facility] Internal Medicine [phone]

## 2024-08-20 ENCOUNTER — TELEPHONE (OUTPATIENT)
Age: 89
End: 2024-08-20

## 2024-08-20 NOTE — TELEPHONE ENCOUNTER
Pt has been feeling weak. Not an emergency just sit downs and lays down.  But different than before.  Harder to get up in the morning. Said it was hard to describe but feels she should see him before November appt.

## 2024-08-20 NOTE — TELEPHONE ENCOUNTER
Pt c/o being weak in the morning when she wakes up. States that it feels like her legs are heavy and she can't get going. States that it does get better as the day goes on. Pt states she is not always short of breath, but does get winded when moving around some days. Pt denies any symptoms presently. Pt requested sooner appointment with Dr. Joseph.     Pt is scheduled 8/26/24 at 2:00pm in the [place] office. Pt confirms date, time, and location of appt.     Pt instructed to call back if she feels worse. She thanked and voiced understanding.

## 2024-08-21 ENCOUNTER — ANTI-COAG VISIT (OUTPATIENT)
Dept: INTERNAL MEDICINE CLINIC | Facility: CLINIC | Age: 89
End: 2024-08-21

## 2024-08-21 DIAGNOSIS — Z79.01 ANTICOAGULANT LONG-TERM USE: ICD-10-CM

## 2024-08-21 DIAGNOSIS — I48.21 PERMANENT ATRIAL FIBRILLATION (HCC): Primary | ICD-10-CM

## 2024-08-21 LAB — INR BLD: 2.2

## 2024-08-21 NOTE — PROGRESS NOTES
Anticoagulation Summary  As of 2024      INR goal:  2.0-3.0   TTR:  71.7% (2.2 y)   INR used for dosin.20 (2024)   Warfarin maintenance plan:  2.5 mg (5 mg x 0.5) every Fri; 5 mg (5 mg x 1) all other days   Weekly warfarin total:  32.5 mg   Plan last modified:  Drew Kovacs MD (2024)   Next INR check:  2024   Target end date:  Indefinite    Indications    Permanent atrial fibrillation (HCC) [I48.21]  Anticoagulant long-term use [Z79.01]                 Anticoagulation Episode Summary       INR check location:  Home Draw    Preferred lab:  --    Send INR reminders to:  --    Comments:  --          Anticoagulation Care Providers       Provider Role Specialty Phone number    Drew Kovacs MD [facility] Internal Medicine [phone]

## 2024-08-26 ENCOUNTER — OFFICE VISIT (OUTPATIENT)
Age: 89
End: 2024-08-26
Payer: MEDICARE

## 2024-08-26 VITALS
HEART RATE: 64 BPM | SYSTOLIC BLOOD PRESSURE: 130 MMHG | HEIGHT: 69 IN | DIASTOLIC BLOOD PRESSURE: 60 MMHG | WEIGHT: 139 LBS | BODY MASS INDEX: 20.59 KG/M2

## 2024-08-26 DIAGNOSIS — Z98.890 S/P MITRAL VALVE REPAIR: ICD-10-CM

## 2024-08-26 DIAGNOSIS — R00.2 PALPITATIONS: ICD-10-CM

## 2024-08-26 DIAGNOSIS — I48.21 PERMANENT ATRIAL FIBRILLATION (HCC): Primary | ICD-10-CM

## 2024-08-26 PROCEDURE — G8428 CUR MEDS NOT DOCUMENT: HCPCS | Performed by: INTERNAL MEDICINE

## 2024-08-26 PROCEDURE — G8420 CALC BMI NORM PARAMETERS: HCPCS | Performed by: INTERNAL MEDICINE

## 2024-08-26 PROCEDURE — 1090F PRES/ABSN URINE INCON ASSESS: CPT | Performed by: INTERNAL MEDICINE

## 2024-08-26 PROCEDURE — 1036F TOBACCO NON-USER: CPT | Performed by: INTERNAL MEDICINE

## 2024-08-26 PROCEDURE — 1123F ACP DISCUSS/DSCN MKR DOCD: CPT | Performed by: INTERNAL MEDICINE

## 2024-08-26 PROCEDURE — 93000 ELECTROCARDIOGRAM COMPLETE: CPT | Performed by: INTERNAL MEDICINE

## 2024-08-26 PROCEDURE — 99214 OFFICE O/P EST MOD 30 MIN: CPT | Performed by: INTERNAL MEDICINE

## 2024-08-26 RX ORDER — DILTIAZEM HYDROCHLORIDE 240 MG/1
240 CAPSULE, COATED, EXTENDED RELEASE ORAL DAILY
Qty: 90 CAPSULE | Refills: 3 | Status: SHIPPED | OUTPATIENT
Start: 2024-08-26

## 2024-08-26 RX ORDER — DILTIAZEM HYDROCHLORIDE 180 MG/1
CAPSULE, COATED, EXTENDED RELEASE ORAL
Qty: 90 CAPSULE | Refills: 3 | Status: SHIPPED | OUTPATIENT
Start: 2024-08-26 | End: 2024-08-26 | Stop reason: ALTCHOICE

## 2024-08-26 RX ORDER — DIGOXIN 125 MCG
TABLET ORAL
Qty: 65 TABLET | Refills: 3 | Status: SHIPPED | OUTPATIENT
Start: 2024-08-26

## 2024-08-26 NOTE — PROGRESS NOTES
[facility] CARDIOLOGY  [address] [place] [address], [place] [address]  PHONE: [phone]      24    NAME:  Lynette Park  : 1935  MRN: [id number]       SUBJECTIVE:   Lynette Park is a 89 y.o. female seen for a follow up visit regarding the following:     Chief Complaint   Patient presents with    Atrial Fibrillation         HPI:    No cp. No orthopnea or pnd. No palpitations or syncope.  Increased fatigue.   SABA at baseline.    Past Medical History, Past Surgical History, Family history, Social History, and Medications were all reviewed with the patient today and updated as necessary.     Current Outpatient Medications   Medication Sig Dispense Refill    digoxin (LANOXIN) 125 MCG tablet TAKE 1 TABLET DAILY EXCEPT ON MONDAY AND  65 tablet 3    gabapentin (NEURONTIN) 100 MG capsule Take 1 capsule by mouth 2 times daily for 180 days. Intended supply: 90 days (Patient taking differently: Take 1 capsule by mouth nightly. Intended supply: 90 days) 180 capsule 1    diphenhydrAMINE (ALLERGY RELIEF) 25 MG tablet Take 1 tablet by mouth at bedtime      warfarin (COUMADIN) 5 MG tablet TAKE 1 TABLET EVERY  tablet 3    dilTIAZem (CARDIZEM CD) 240 MG extended release capsule TAKE 1 CAPSULE EVERY DAY 90 capsule 10    levothyroxine (SYNTHROID) 100 MCG tablet TAKE 1 TABLET EVERY MORNING BEFORE BREAKFAST 90 tablet 3    polyethylene glycol (GLYCOLAX) 17 GM/SCOOP powder Take 17 g by mouth daily      sodium chloride (MARAL 128) 2 % ophthalmic solution Apply 1 drop to eye 2 times daily      vitamin C (ASCORBIC ACID) 500 MG tablet Take 1 tablet by mouth daily (Patient not taking: Reported on 2024)      vitamin D (VITAMIN D3) 50 MCG (2000) CAPS capsule Take 1 capsule by mouth daily (Patient not taking: Reported on 2024)       No current facility-administered medications for this visit.               Social History     Tobacco Use    Smoking status: Never    Smokeless tobacco:

## 2024-08-28 ENCOUNTER — ANTI-COAG VISIT (OUTPATIENT)
Dept: INTERNAL MEDICINE CLINIC | Facility: CLINIC | Age: 89
End: 2024-08-28

## 2024-08-28 DIAGNOSIS — Z79.01 ANTICOAGULANT LONG-TERM USE: ICD-10-CM

## 2024-08-28 DIAGNOSIS — I48.21 PERMANENT ATRIAL FIBRILLATION (HCC): Primary | ICD-10-CM

## 2024-08-28 LAB — INR BLD: 2

## 2024-08-28 NOTE — PROGRESS NOTES
Anticoagulation Summary  As of 2024      INR goal:  2.0-3.0   TTR:  72.0% (2.2 y)   INR used for dosin.00 (2024)   Warfarin maintenance plan:  2.5 mg (5 mg x 0.5) every Fri; 5 mg (5 mg x 1) all other days   Weekly warfarin total:  32.5 mg   Plan last modified:  Drew Kovacs MD (2024)   Next INR check:  2024   Target end date:  Indefinite    Indications    Permanent atrial fibrillation (HCC) [I48.21]  Anticoagulant long-term use [Z79.01]                 Anticoagulation Episode Summary       INR check location:  Home Draw    Preferred lab:  --    Send INR reminders to:  --    Comments:  --          Anticoagulation Care Providers       Provider Role Specialty Phone number    Drew Kovacs MD [facility] Internal Medicine [phone]

## 2024-09-03 DIAGNOSIS — G62.9 NEUROPATHY: ICD-10-CM

## 2024-09-03 RX ORDER — GABAPENTIN 100 MG/1
100 CAPSULE ORAL NIGHTLY
Qty: 90 CAPSULE | Refills: 3 | Status: SHIPPED | OUTPATIENT
Start: 2024-09-03 | End: 2025-08-29

## 2024-09-06 RX ORDER — LEVOTHYROXINE SODIUM 100 UG/1
100 TABLET ORAL
Qty: 90 TABLET | Refills: 3 | Status: SHIPPED | OUTPATIENT
Start: 2024-09-06

## 2024-09-11 LAB — INR BLD: 2.6

## 2024-09-12 ENCOUNTER — ANTI-COAG VISIT (OUTPATIENT)
Dept: INTERNAL MEDICINE CLINIC | Facility: CLINIC | Age: 89
End: 2024-09-12

## 2024-09-12 DIAGNOSIS — I48.21 PERMANENT ATRIAL FIBRILLATION (HCC): Primary | ICD-10-CM

## 2024-09-12 DIAGNOSIS — Z79.01 ANTICOAGULANT LONG-TERM USE: ICD-10-CM

## 2024-09-18 ENCOUNTER — ANTI-COAG VISIT (OUTPATIENT)
Dept: INTERNAL MEDICINE CLINIC | Facility: CLINIC | Age: 89
End: 2024-09-18

## 2024-09-18 DIAGNOSIS — Z79.01 ANTICOAGULANT LONG-TERM USE: ICD-10-CM

## 2024-09-18 DIAGNOSIS — I48.21 PERMANENT ATRIAL FIBRILLATION (HCC): Primary | ICD-10-CM

## 2024-09-18 LAB — INR BLD: 2.4

## 2024-09-24 PROCEDURE — 93294 REM INTERROG EVL PM/LDLS PM: CPT | Performed by: INTERNAL MEDICINE

## 2024-09-24 PROCEDURE — 93296 REM INTERROG EVL PM/IDS: CPT | Performed by: INTERNAL MEDICINE

## 2024-09-25 ENCOUNTER — OFFICE VISIT (OUTPATIENT)
Dept: INTERNAL MEDICINE CLINIC | Facility: CLINIC | Age: 89
End: 2024-09-25

## 2024-09-25 VITALS
TEMPERATURE: 97.2 F | RESPIRATION RATE: 16 BRPM | DIASTOLIC BLOOD PRESSURE: 66 MMHG | BODY MASS INDEX: 21.51 KG/M2 | HEIGHT: 69 IN | HEART RATE: 66 BPM | WEIGHT: 145.2 LBS | SYSTOLIC BLOOD PRESSURE: 141 MMHG

## 2024-09-25 DIAGNOSIS — I48.21 PERMANENT ATRIAL FIBRILLATION (HCC): ICD-10-CM

## 2024-09-25 DIAGNOSIS — E55.9 VITAMIN D DEFICIENCY: ICD-10-CM

## 2024-09-25 DIAGNOSIS — E03.9 ACQUIRED HYPOTHYROIDISM: ICD-10-CM

## 2024-09-25 DIAGNOSIS — E78.49 OTHER HYPERLIPIDEMIA: ICD-10-CM

## 2024-09-25 DIAGNOSIS — R68.89 OTHER GENERAL SYMPTOMS AND SIGNS: ICD-10-CM

## 2024-09-25 DIAGNOSIS — Z23 FLU VACCINE NEED: Primary | ICD-10-CM

## 2024-09-25 DIAGNOSIS — R53.1 WEAKNESS: ICD-10-CM

## 2024-09-25 LAB
25(OH)D3 SERPL-MCNC: 48.8 NG/ML (ref 30–100)
ALBUMIN SERPL-MCNC: 4.4 G/DL (ref 3.2–4.6)
ALBUMIN/GLOB SERPL: 1.6 (ref 1–1.9)
ALP SERPL-CCNC: 115 U/L (ref 35–104)
ALT SERPL-CCNC: 23 U/L (ref 8–45)
ANION GAP SERPL CALC-SCNC: 10 MMOL/L (ref 9–18)
AST SERPL-CCNC: 44 U/L (ref 15–37)
BASOPHILS # BLD: 0 K/UL (ref 0–0.2)
BASOPHILS NFR BLD: 1 % (ref 0–2)
BILIRUB SERPL-MCNC: 0.9 MG/DL (ref 0–1.2)
BUN SERPL-MCNC: 15 MG/DL (ref 8–23)
CALCIUM SERPL-MCNC: 9.6 MG/DL (ref 8.8–10.2)
CHLORIDE SERPL-SCNC: 103 MMOL/L (ref 98–107)
CO2 SERPL-SCNC: 27 MMOL/L (ref 20–28)
CREAT SERPL-MCNC: 0.78 MG/DL (ref 0.6–1.1)
DIFFERENTIAL METHOD BLD: ABNORMAL
DIGOXIN SERPL-MCNC: 1 NG/ML (ref 0.5–0.9)
EOSINOPHIL # BLD: 0 K/UL (ref 0–0.8)
EOSINOPHIL NFR BLD: 1 % (ref 0.5–7.8)
ERYTHROCYTE [DISTWIDTH] IN BLOOD BY AUTOMATED COUNT: 13.2 % (ref 11.9–14.6)
GLOBULIN SER CALC-MCNC: 2.7 G/DL (ref 2.3–3.5)
GLUCOSE SERPL-MCNC: 94 MG/DL (ref 70–99)
HCT VFR BLD AUTO: 38.2 % (ref 35.8–46.3)
HGB BLD-MCNC: 12.1 G/DL (ref 11.7–15.4)
IMM GRANULOCYTES # BLD AUTO: 0 K/UL (ref 0–0.5)
IMM GRANULOCYTES NFR BLD AUTO: 0 % (ref 0–5)
LYMPHOCYTES # BLD: 1.6 K/UL (ref 0.5–4.6)
LYMPHOCYTES NFR BLD: 25 % (ref 13–44)
MCH RBC QN AUTO: 31 PG (ref 26.1–32.9)
MCHC RBC AUTO-ENTMCNC: 31.7 G/DL (ref 31.4–35)
MCV RBC AUTO: 97.9 FL (ref 82–102)
MONOCYTES # BLD: 0.5 K/UL (ref 0.1–1.3)
MONOCYTES NFR BLD: 7 % (ref 4–12)
NEUTS SEG # BLD: 4.4 K/UL (ref 1.7–8.2)
NEUTS SEG NFR BLD: 66 % (ref 43–78)
NRBC # BLD: 0 K/UL (ref 0–0.2)
PLATELET # BLD AUTO: 180 K/UL (ref 150–450)
PMV BLD AUTO: 12.7 FL (ref 9.4–12.3)
POTASSIUM SERPL-SCNC: 4.1 MMOL/L (ref 3.5–5.1)
PROT SERPL-MCNC: 7.2 G/DL (ref 6.3–8.2)
RBC # BLD AUTO: 3.9 M/UL (ref 4.05–5.2)
SODIUM SERPL-SCNC: 140 MMOL/L (ref 136–145)
TSH, 3RD GENERATION: 0.53 UIU/ML (ref 0.27–4.2)
VIT B12 SERPL-MCNC: 219 PG/ML (ref 193–986)
WBC # BLD AUTO: 6.6 K/UL (ref 4.3–11.1)

## 2024-09-25 RX ORDER — WARFARIN SODIUM 5 MG/1
5 TABLET ORAL DAILY
Qty: 90 TABLET | Refills: 3 | Status: SHIPPED | OUTPATIENT
Start: 2024-09-25

## 2024-09-25 SDOH — ECONOMIC STABILITY: FOOD INSECURITY: WITHIN THE PAST 12 MONTHS, THE FOOD YOU BOUGHT JUST DIDN'T LAST AND YOU DIDN'T HAVE MONEY TO GET MORE.: NEVER TRUE

## 2024-09-25 SDOH — ECONOMIC STABILITY: FOOD INSECURITY: WITHIN THE PAST 12 MONTHS, YOU WORRIED THAT YOUR FOOD WOULD RUN OUT BEFORE YOU GOT MONEY TO BUY MORE.: NEVER TRUE

## 2024-09-25 SDOH — ECONOMIC STABILITY: INCOME INSECURITY: HOW HARD IS IT FOR YOU TO PAY FOR THE VERY BASICS LIKE FOOD, HOUSING, MEDICAL CARE, AND HEATING?: NOT HARD AT ALL

## 2024-09-25 ASSESSMENT — PATIENT HEALTH QUESTIONNAIRE - PHQ9
SUM OF ALL RESPONSES TO PHQ QUESTIONS 1-9: 2
SUM OF ALL RESPONSES TO PHQ9 QUESTIONS 1 & 2: 2
SUM OF ALL RESPONSES TO PHQ QUESTIONS 1-9: 2
2. FEELING DOWN, DEPRESSED OR HOPELESS: SEVERAL DAYS
SUM OF ALL RESPONSES TO PHQ QUESTIONS 1-9: 2
SUM OF ALL RESPONSES TO PHQ QUESTIONS 1-9: 2
1. LITTLE INTEREST OR PLEASURE IN DOING THINGS: SEVERAL DAYS

## 2024-09-25 ASSESSMENT — LIFESTYLE VARIABLES
HOW MANY STANDARD DRINKS CONTAINING ALCOHOL DO YOU HAVE ON A TYPICAL DAY: PATIENT DOES NOT DRINK
HOW OFTEN DO YOU HAVE A DRINK CONTAINING ALCOHOL: NEVER

## 2024-10-02 LAB — INR BLD: 1.9

## 2024-10-04 ENCOUNTER — ANTI-COAG VISIT (OUTPATIENT)
Dept: INTERNAL MEDICINE CLINIC | Facility: CLINIC | Age: 89
End: 2024-10-04

## 2024-10-04 DIAGNOSIS — I48.21 PERMANENT ATRIAL FIBRILLATION (HCC): Primary | ICD-10-CM

## 2024-10-04 DIAGNOSIS — Z79.01 ANTICOAGULANT LONG-TERM USE: ICD-10-CM

## 2024-10-04 NOTE — PROGRESS NOTES
Anticoagulation Summary  As of 10/4/2024      INR goal:  2.0-3.0   TTR:  72.8% (2.3 y)   INR used for dosin.90 (10/2/2024)   Warfarin maintenance plan:  2.5 mg (5 mg x 0.5) every Fri; 5 mg (5 mg x 1) all other days   Weekly warfarin total:  32.5 mg   Plan last modified:  Drew Kovacs MD (2024)   Next INR check:  10/9/2024   Target end date:  Indefinite    Indications    Permanent atrial fibrillation (HCC) [I48.21]  Anticoagulant long-term use [Z79.01]                 Anticoagulation Episode Summary       INR check location:  Home Draw    Preferred lab:  --    Send INR reminders to:  --    Comments:  --          Anticoagulation Care Providers       Provider Role Specialty Phone number    Drew Kovacs MD [facility] Internal Medicine [phone]

## 2024-10-09 LAB — INR BLD: 2.5

## 2024-10-10 ENCOUNTER — ANTI-COAG VISIT (OUTPATIENT)
Dept: INTERNAL MEDICINE CLINIC | Facility: CLINIC | Age: 89
End: 2024-10-10

## 2024-10-10 DIAGNOSIS — I48.21 PERMANENT ATRIAL FIBRILLATION (HCC): Primary | ICD-10-CM

## 2024-10-10 DIAGNOSIS — Z79.01 ANTICOAGULANT LONG-TERM USE: ICD-10-CM

## 2024-10-10 NOTE — PROGRESS NOTES
Anticoagulation Summary  As of 10/10/2024      INR goal:  2.0-3.0   TTR:  72.9% (2.3 y)   INR used for dosin.50 (10/9/2024)   Warfarin maintenance plan:  2.5 mg (5 mg x 0.5) every Fri; 5 mg (5 mg x 1) all other days   Weekly warfarin total:  32.5 mg   Plan last modified:  Drew Kovacs MD (2024)   Next INR check:  10/16/2024   Target end date:  Indefinite    Indications    Permanent atrial fibrillation (HCC) [I48.21]  Anticoagulant long-term use [Z79.01]                 Anticoagulation Episode Summary       INR check location:  Home Draw    Preferred lab:  --    Send INR reminders to:  --    Comments:  --          Anticoagulation Care Providers       Provider Role Specialty Phone number    Drew Kovacs MD [facility] Internal Medicine [phone]

## 2024-10-16 ENCOUNTER — TELEPHONE (OUTPATIENT)
Age: 89
End: 2024-10-16

## 2024-10-16 LAB — INR BLD: 2

## 2024-10-16 NOTE — TELEPHONE ENCOUNTER
Pt called in and stated can she take tylenol for arthritis with her medications she already takes.

## 2024-10-17 ENCOUNTER — ANTI-COAG VISIT (OUTPATIENT)
Dept: INTERNAL MEDICINE CLINIC | Facility: CLINIC | Age: 89
End: 2024-10-17

## 2024-10-17 DIAGNOSIS — Z79.01 ANTICOAGULANT LONG-TERM USE: ICD-10-CM

## 2024-10-17 DIAGNOSIS — I48.21 PERMANENT ATRIAL FIBRILLATION (HCC): Primary | ICD-10-CM

## 2024-10-17 NOTE — PROGRESS NOTES
Anticoagulation Summary  As of 10/17/2024      INR goal:  2.0-3.0   TTR:  73.1% (2.4 y)   INR used for dosin.00 (10/16/2024)   Warfarin maintenance plan:  2.5 mg (5 mg x 0.5) every Fri; 5 mg (5 mg x 1) all other days   Weekly warfarin total:  32.5 mg   Plan last modified:  Drew Kovacs MD (2024)   Next INR check:  10/23/2024   Target end date:  Indefinite    Indications    Permanent atrial fibrillation (HCC) [I48.21]  Anticoagulant long-term use [Z79.01]                 Anticoagulation Episode Summary       INR check location:  Home Draw    Preferred lab:  --    Send INR reminders to:  --    Comments:  --          Anticoagulation Care Providers       Provider Role Specialty Phone number    Drew Kovacs MD [facility] Internal Medicine [phone]

## 2024-10-24 ENCOUNTER — ANTI-COAG VISIT (OUTPATIENT)
Dept: INTERNAL MEDICINE CLINIC | Facility: CLINIC | Age: 89
End: 2024-10-24

## 2024-10-24 DIAGNOSIS — Z79.01 ANTICOAGULANT LONG-TERM USE: ICD-10-CM

## 2024-10-24 DIAGNOSIS — I48.21 PERMANENT ATRIAL FIBRILLATION (HCC): Primary | ICD-10-CM

## 2024-10-24 LAB — INR BLD: 1.7

## 2024-10-24 NOTE — PROGRESS NOTES
Anticoagulation Summary  As of 10/24/2024      INR goal:  2.0-3.0   TTR:  72.4% (2.4 y)   INR used for dosin.70 (10/24/2024)   Warfarin maintenance plan:  2.5 mg (5 mg x 0.5) every Fri; 5 mg (5 mg x 1) all other days   Weekly warfarin total:  32.5 mg   Plan last modified:  Drew Kovacs MD (2024)   Next INR check:  10/31/2024   Target end date:  Indefinite    Indications    Permanent atrial fibrillation (HCC) [I48.21]  Anticoagulant long-term use [Z79.01]                 Anticoagulation Episode Summary       INR check location:  Home Draw    Preferred lab:  --    Send INR reminders to:  --    Comments:  --          Anticoagulation Care Providers       Provider Role Specialty Phone number    Drew Kovacs MD [facility] Internal Medicine [phone]          The medication changes has been fully explained to the patient, who indicates understanding.

## 2024-10-30 ENCOUNTER — OFFICE VISIT (OUTPATIENT)
Age: 89
End: 2024-10-30

## 2024-10-30 VITALS
HEART RATE: 84 BPM | HEIGHT: 68 IN | DIASTOLIC BLOOD PRESSURE: 60 MMHG | WEIGHT: 138 LBS | BODY MASS INDEX: 20.92 KG/M2 | SYSTOLIC BLOOD PRESSURE: 142 MMHG

## 2024-10-30 DIAGNOSIS — I48.21 PERMANENT ATRIAL FIBRILLATION (HCC): Primary | ICD-10-CM

## 2024-10-30 DIAGNOSIS — Z98.890 S/P MITRAL VALVE REPAIR: ICD-10-CM

## 2024-10-30 PROBLEM — R53.83 OTHER FATIGUE: Status: ACTIVE | Noted: 2017-03-09

## 2024-10-30 LAB — INR BLD: 1.5

## 2024-10-30 NOTE — PROGRESS NOTES
[facility] CARDIOLOGY  [address] [place] [address], [address]  [place] [address]  PHONE: [phone]      10/30/24    NAME:  Lynette Park  : 1935  MRN: [id number]       SUBJECTIVE:   Lynette Park is a 89 y.o. female seen for a follow up visit regarding the following:     Chief Complaint   Patient presents with    Permanent atrial fibrillation         HPI:    No cp. Mild myers. No orthopnea or pnd. No palpitations or syncope.      Past Medical History, Past Surgical History, Family history, Social History, and Medications were all reviewed with the patient today and updated as necessary.     Current Outpatient Medications   Medication Sig Dispense Refill    warfarin (COUMADIN) 5 MG tablet Take 1 tablet by mouth daily 90 tablet 3    levothyroxine (SYNTHROID) 100 MCG tablet TAKE 1 TABLET EVERY MORNING BEFORE BREAKFAST 90 tablet 3    gabapentin (NEURONTIN) 100 MG capsule Take 1 capsule by mouth nightly for 360 days. Intended supply: 90 days 90 capsule 3    digoxin (LANOXIN) 125 MCG tablet TAKE 1 TABLET DAILY EXCEPT ON MONDAY AND  65 tablet 3    dilTIAZem (CARDIZEM CD) 240 MG extended release capsule Take 1 capsule by mouth daily 90 capsule 3    polyethylene glycol (GLYCOLAX) 17 GM/SCOOP powder Take 17 g by mouth daily as needed (constipation)      sodium chloride (MARAL 128) 2 % ophthalmic solution Apply 1 drop to eye 2 times daily       No current facility-administered medications for this visit.               Social History     Tobacco Use    Smoking status: Never    Smokeless tobacco: Never   Substance Use Topics    Alcohol use: No              PHYSICAL EXAM:   BP (!) 142/60   Pulse 84   Ht 1.727 m (5' 8\")   Wt 62.6 kg (138 lb)   BMI 20.98 kg/m²    Constitutional: Oriented to person, place, and time. Appears well-developed and well-nourished.   Head: Normocephalic and atraumatic.   Neck: Neck supple.   Cardiovascular: Normal rate and regular rhythm with no murmur -No JVP  Pulmonary/Chest:

## 2024-10-31 ENCOUNTER — TELEPHONE (OUTPATIENT)
Dept: INTERNAL MEDICINE CLINIC | Facility: CLINIC | Age: 89
End: 2024-10-31

## 2024-10-31 ENCOUNTER — ANTI-COAG VISIT (OUTPATIENT)
Dept: INTERNAL MEDICINE CLINIC | Facility: CLINIC | Age: 89
End: 2024-10-31

## 2024-10-31 DIAGNOSIS — Z79.01 ANTICOAGULANT LONG-TERM USE: ICD-10-CM

## 2024-10-31 DIAGNOSIS — I48.21 PERMANENT ATRIAL FIBRILLATION (HCC): Primary | ICD-10-CM

## 2024-10-31 NOTE — PROGRESS NOTES
Anticoagulation Summary  As of 10/31/2024      INR goal:  2.0-3.0   TTR:  71.9% (2.4 y)   INR used for dosin.50 (10/30/2024)   Warfarin maintenance plan:  5 mg (5 mg x 1) every day   Weekly warfarin total:  35 mg   Plan last modified:  Drew Kovacs MD (10/24/2024)   Next INR check:  2024   Target end date:  Indefinite    Indications    Permanent atrial fibrillation (HCC) [I48.21]  Anticoagulant long-term use [Z79.01]                 Anticoagulation Episode Summary       INR check location:  Home Draw    Preferred lab:  --    Send INR reminders to:  --    Comments:  --          Anticoagulation Care Providers       Provider Role Specialty Phone number    Drew Kovacs MD [facility] Internal Medicine [phone]          The medication changes has been fully explained to the patient, who indicates understanding.

## 2024-10-31 NOTE — TELEPHONE ENCOUNTER
Pt called with questions regarding blood work and medication. Would like a call back by end of day so she will know how to take her meds.

## 2024-11-06 ENCOUNTER — ANTI-COAG VISIT (OUTPATIENT)
Dept: INTERNAL MEDICINE CLINIC | Facility: CLINIC | Age: 89
End: 2024-11-06

## 2024-11-06 DIAGNOSIS — Z79.01 ANTICOAGULANT LONG-TERM USE: ICD-10-CM

## 2024-11-06 DIAGNOSIS — I48.21 PERMANENT ATRIAL FIBRILLATION (HCC): Primary | ICD-10-CM

## 2024-11-06 LAB — INR BLD: 1.7

## 2024-11-06 NOTE — PROGRESS NOTES
The medication changes has been fully explained to the patient, who indicates understanding.    Anticoagulation Summary  As of 2024      INR goal:  2.0-3.0   TTR:  71.4% (2.4 y)   INR used for dosin.70 (2024)   Warfarin maintenance plan:  7.5 mg (5 mg x 1.5) every Mon, Wed, Fri; 5 mg (5 mg x 1) all other days   Weekly warfarin total:  42.5 mg   Plan last modified:  Drew Kovacs MD (2024)   Next INR check:  2024   Target end date:  Indefinite    Indications    Permanent atrial fibrillation (HCC) [I48.21]  Anticoagulant long-term use [Z79.01]                 Anticoagulation Episode Summary       INR check location:  Home Draw    Preferred lab:  --    Send INR reminders to:  --    Comments:  --          Anticoagulation Care Providers       Provider Role Specialty Phone number    Drew Kovacs MD [facility] Internal Medicine [phone]

## 2024-11-11 ENCOUNTER — TELEPHONE (OUTPATIENT)
Dept: INTERNAL MEDICINE CLINIC | Facility: CLINIC | Age: 89
End: 2024-11-11

## 2024-11-11 NOTE — TELEPHONE ENCOUNTER
HOME HEALTH NURSE CALL      Name of the Nurse Calling and Facility: [facility]      Best Contact Number to Reach the Nurse: [phone]           Reason For Call: Needs verbal orders to start HH

## 2024-11-13 ENCOUNTER — ANTI-COAG VISIT (OUTPATIENT)
Dept: INTERNAL MEDICINE CLINIC | Facility: CLINIC | Age: 89
End: 2024-11-13

## 2024-11-13 DIAGNOSIS — I48.21 PERMANENT ATRIAL FIBRILLATION (HCC): Primary | ICD-10-CM

## 2024-11-13 DIAGNOSIS — Z79.01 ANTICOAGULANT LONG-TERM USE: ICD-10-CM

## 2024-11-13 LAB — INR BLD: 2.7

## 2024-11-18 ENCOUNTER — TELEPHONE (OUTPATIENT)
Dept: INTERNAL MEDICINE CLINIC | Facility: CLINIC | Age: 89
End: 2024-11-18

## 2024-11-18 NOTE — TELEPHONE ENCOUNTER
Name of the Nurse Calling and Facility: Guilla[facility]     Best Contact Number to Reach the Nurse: [phone]    Reason For Call:  Wanted to make you aware of the pt's high BP reading today:  164/68

## 2024-11-20 ENCOUNTER — ANTI-COAG VISIT (OUTPATIENT)
Dept: INTERNAL MEDICINE CLINIC | Facility: CLINIC | Age: 89
End: 2024-11-20

## 2024-11-20 DIAGNOSIS — I48.21 PERMANENT ATRIAL FIBRILLATION (HCC): Primary | ICD-10-CM

## 2024-11-20 DIAGNOSIS — Z79.01 ANTICOAGULANT LONG-TERM USE: ICD-10-CM

## 2024-11-20 LAB — INR BLD: 2.3

## 2024-11-20 NOTE — PROGRESS NOTES
Anticoagulation Summary  As of 2024      INR goal:  2.0-3.0   TTR:  71.6% (2.5 y)   INR used for dosin.30 (2024)   Warfarin maintenance plan:  7.5 mg (5 mg x 1.5) every Mon, Wed, Fri; 5 mg (5 mg x 1) all other days   Weekly warfarin total:  42.5 mg   Plan last modified:  Drew Kovacs MD (2024)   Next INR check:  2024   Target end date:  Indefinite    Indications    Permanent atrial fibrillation (HCC) [I48.21]  Anticoagulant long-term use [Z79.01]                 Anticoagulation Episode Summary       INR check location:  Home Draw    Preferred lab:  --    Send INR reminders to:  --    Comments:  --          Anticoagulation Care Providers       Provider Role Specialty Phone number    Drew Kovacs MD [facility] Internal Medicine [phone]

## 2024-11-21 ENCOUNTER — TELEPHONE (OUTPATIENT)
Dept: INTERNAL MEDICINE CLINIC | Facility: CLINIC | Age: 89
End: 2024-11-21

## 2024-11-27 ENCOUNTER — ANTI-COAG VISIT (OUTPATIENT)
Dept: INTERNAL MEDICINE CLINIC | Facility: CLINIC | Age: 89
End: 2024-11-27

## 2024-11-27 DIAGNOSIS — Z79.01 ANTICOAGULANT LONG-TERM USE: ICD-10-CM

## 2024-11-27 DIAGNOSIS — I48.21 PERMANENT ATRIAL FIBRILLATION (HCC): Primary | ICD-10-CM

## 2024-11-27 LAB — INR BLD: 3

## 2024-11-27 NOTE — PROGRESS NOTES
Anticoagulation Summary  As of 11/27/2024      INR goal:  2.0-3.0   TTR:  71.8% (2.5 y)   INR used for dosing:  3.00 (11/27/2024)   Warfarin maintenance plan:  7.5 mg (5 mg x 1.5) every Mon, Wed, Fri; 5 mg (5 mg x 1) all other days   Weekly warfarin total:  42.5 mg   Plan last modified:  Drew Kovacs MD (11/6/2024)   Next INR check:  12/4/2024   Target end date:  Indefinite    Indications    Permanent atrial fibrillation (HCC) [I48.21]  Anticoagulant long-term use [Z79.01]                 Anticoagulation Episode Summary       INR check location:  Home Draw    Preferred lab:  --    Send INR reminders to:  --    Comments:  --          Anticoagulation Care Providers       Provider Role Specialty Phone number    Drew Kovacs MD [facility] Internal Medicine [phone]

## 2024-12-04 LAB — INR BLD: 2.1

## 2024-12-05 ENCOUNTER — ANTI-COAG VISIT (OUTPATIENT)
Dept: INTERNAL MEDICINE CLINIC | Facility: CLINIC | Age: 89
End: 2024-12-05

## 2024-12-05 DIAGNOSIS — I48.21 PERMANENT ATRIAL FIBRILLATION (HCC): Primary | ICD-10-CM

## 2024-12-05 DIAGNOSIS — Z79.01 ANTICOAGULANT LONG-TERM USE: ICD-10-CM

## 2024-12-05 NOTE — PROGRESS NOTES
Anticoagulation Summary  As of 2024      INR goal:  2.0-3.0   TTR:  72.0% (2.5 y)   INR used for dosin.10 (2024)   Warfarin maintenance plan:  7.5 mg (5 mg x 1.5) every Mon, Wed, Fri; 5 mg (5 mg x 1) all other days   Weekly warfarin total:  42.5 mg   Plan last modified:  Drew Kovacs MD (2024)   Next INR check:  2024   Target end date:  Indefinite    Indications    Permanent atrial fibrillation (HCC) [I48.21]  Anticoagulant long-term use [Z79.01]                 Anticoagulation Episode Summary       INR check location:  Home Draw    Preferred lab:  --    Send INR reminders to:  --    Comments:  --          Anticoagulation Care Providers       Provider Role Specialty Phone number    Drew Kovacs MD [facility] Internal Medicine [phone]

## 2024-12-11 ENCOUNTER — ANTI-COAG VISIT (OUTPATIENT)
Dept: INTERNAL MEDICINE CLINIC | Facility: CLINIC | Age: 89
End: 2024-12-11

## 2024-12-11 DIAGNOSIS — I48.21 PERMANENT ATRIAL FIBRILLATION (HCC): Primary | ICD-10-CM

## 2024-12-11 DIAGNOSIS — Z79.01 ANTICOAGULANT LONG-TERM USE: ICD-10-CM

## 2024-12-11 LAB — INR BLD: 2.5

## 2024-12-11 NOTE — PROGRESS NOTES
Anticoagulation Summary  As of 2024      INR goal:  2.0-3.0   TTR:  72.2% (2.5 y)   INR used for dosin.50 (2024)   Warfarin maintenance plan:  7.5 mg (5 mg x 1.5) every Mon, Wed, Fri; 5 mg (5 mg x 1) all other days   Weekly warfarin total:  42.5 mg   Plan last modified:  Drew Kovacs MD (2024)   Next INR check:  2024   Target end date:  Indefinite    Indications    Permanent atrial fibrillation (HCC) [I48.21]  Anticoagulant long-term use [Z79.01]                 Anticoagulation Episode Summary       INR check location:  Home Draw    Preferred lab:  --    Send INR reminders to:  --    Comments:  --          Anticoagulation Care Providers       Provider Role Specialty Phone number    Drew Kovacs MD [facility] Internal Medicine [phone]

## 2024-12-18 ENCOUNTER — ANTI-COAG VISIT (OUTPATIENT)
Dept: INTERNAL MEDICINE CLINIC | Facility: CLINIC | Age: 89
End: 2024-12-18

## 2024-12-18 DIAGNOSIS — I48.21 PERMANENT ATRIAL FIBRILLATION (HCC): Primary | ICD-10-CM

## 2024-12-18 DIAGNOSIS — Z79.01 ANTICOAGULANT LONG-TERM USE: ICD-10-CM

## 2024-12-18 LAB — INR BLD: 3

## 2024-12-18 NOTE — PROGRESS NOTES
Anticoagulation Summary  As of 12/18/2024      INR goal:  2.0-3.0   TTR:  72.5% (2.5 y)   INR used for dosing:  3.00 (12/18/2024)   Warfarin maintenance plan:  7.5 mg (5 mg x 1.5) every Mon, Wed, Fri; 5 mg (5 mg x 1) all other days   Weekly warfarin total:  42.5 mg   Plan last modified:  Drew Kovacs MD (11/6/2024)   Next INR check:  12/25/2024   Target end date:  Indefinite    Indications    Permanent atrial fibrillation (HCC) [I48.21]  Anticoagulant long-term use [Z79.01]                 Anticoagulation Episode Summary       INR check location:  Home Draw    Preferred lab:  --    Send INR reminders to:  --    Comments:  --          Anticoagulation Care Providers       Provider Role Specialty Phone number    Drew Kovacs MD [facility] Internal Medicine [phone]

## 2024-12-23 DIAGNOSIS — I48.21 PERMANENT ATRIAL FIBRILLATION (HCC): ICD-10-CM

## 2024-12-30 RX ORDER — DIGOXIN 125 MCG
125 TABLET ORAL DAILY
Qty: 90 TABLET | Refills: 3 | Status: SHIPPED | OUTPATIENT
Start: 2024-12-30

## 2025-01-06 DIAGNOSIS — I48.21 PERMANENT ATRIAL FIBRILLATION (HCC): ICD-10-CM

## 2025-01-06 DIAGNOSIS — G62.9 NEUROPATHY: ICD-10-CM

## 2025-01-07 RX ORDER — LEVOTHYROXINE SODIUM 100 UG/1
100 TABLET ORAL DAILY
Qty: 90 TABLET | Refills: 3 | Status: SHIPPED | OUTPATIENT
Start: 2025-01-07

## 2025-01-07 RX ORDER — WARFARIN SODIUM 5 MG/1
5 TABLET ORAL DAILY
Qty: 90 TABLET | Refills: 3 | Status: SHIPPED | OUTPATIENT
Start: 2025-01-07

## 2025-01-07 RX ORDER — DIGOXIN 125 MCG
125 TABLET ORAL DAILY
Qty: 90 TABLET | Refills: 3 | Status: SHIPPED | OUTPATIENT
Start: 2025-01-07

## 2025-01-07 RX ORDER — DILTIAZEM HYDROCHLORIDE 240 MG/1
240 CAPSULE, COATED, EXTENDED RELEASE ORAL DAILY
Qty: 90 CAPSULE | Refills: 3 | Status: SHIPPED | OUTPATIENT
Start: 2025-01-07

## 2025-01-07 RX ORDER — GABAPENTIN 100 MG/1
100 CAPSULE ORAL NIGHTLY
Qty: 90 CAPSULE | Refills: 3 | Status: SHIPPED | OUTPATIENT
Start: 2025-01-07 | End: 2026-01-02

## 2025-01-08 LAB — INR BLD: 2.9

## 2025-01-09 ENCOUNTER — ANTI-COAG VISIT (OUTPATIENT)
Dept: INTERNAL MEDICINE CLINIC | Facility: CLINIC | Age: 89
End: 2025-01-09

## 2025-01-09 DIAGNOSIS — I48.21 PERMANENT ATRIAL FIBRILLATION (HCC): Primary | ICD-10-CM

## 2025-01-09 DIAGNOSIS — Z79.01 ANTICOAGULANT LONG-TERM USE: ICD-10-CM

## 2025-01-09 NOTE — PROGRESS NOTES
Anticoagulation Summary  As of 2025      INR goal:  2.0-3.0   TTR:  73.1% (2.6 y)   INR used for dosin.90 (2025)   Warfarin maintenance plan:  7.5 mg (5 mg x 1.5) every Mon, Wed, Fri; 5 mg (5 mg x 1) all other days   Weekly warfarin total:  42.5 mg   Plan last modified:  Drew Kovacs MD (2024)   Next INR check:  1/15/2025   Target end date:  Indefinite    Indications    Permanent atrial fibrillation (HCC) [I48.21]  Anticoagulant long-term use [Z79.01]                 Anticoagulation Episode Summary       INR check location:  Home Draw    Preferred lab:  --    Send INR reminders to:  --    Comments:  --          Anticoagulation Care Providers       Provider Role Specialty Phone number    Drew Kovacs MD [facility] Internal Medicine [phone]

## 2025-01-15 ENCOUNTER — ANTI-COAG VISIT (OUTPATIENT)
Dept: INTERNAL MEDICINE CLINIC | Facility: CLINIC | Age: 89
End: 2025-01-15

## 2025-01-15 DIAGNOSIS — I48.21 PERMANENT ATRIAL FIBRILLATION (HCC): Primary | ICD-10-CM

## 2025-01-15 DIAGNOSIS — Z79.01 ANTICOAGULANT LONG-TERM USE: ICD-10-CM

## 2025-01-15 LAB — INR BLD: 3.3

## 2025-01-15 NOTE — PROGRESS NOTES
Anticoagulation Summary  As of 1/15/2025      INR goal:  2.0-3.0   TTR:  72.7% (2.6 y)   INR used for dosing:  3.30 (1/15/2025)   Warfarin maintenance plan:  7.5 mg (5 mg x 1.5) every Mon, Wed, Fri; 5 mg (5 mg x 1) all other days   Weekly warfarin total:  42.5 mg   Plan last modified:  Drew Kovacs MD (11/6/2024)   Next INR check:  1/22/2025   Target end date:  Indefinite    Indications    Permanent atrial fibrillation (HCC) [I48.21]  Anticoagulant long-term use [Z79.01]                 Anticoagulation Episode Summary       INR check location:  Home Draw    Preferred lab:  --    Send INR reminders to:  --    Comments:  --          Anticoagulation Care Providers       Provider Role Specialty Phone number    Drew Kovacs MD [facility] Internal Medicine [phone]          Medication changes given to pt's daughter who verbally understands

## 2025-01-16 ENCOUNTER — TELEPHONE (OUTPATIENT)
Dept: INTERNAL MEDICINE CLINIC | Facility: CLINIC | Age: 89
End: 2025-01-16

## 2025-01-16 NOTE — TELEPHONE ENCOUNTER
HOME HEALTH NURSE CALL      Name of the Nurse Calling and Facility: Sarah VAUGHN       Best Contact Number to Reach the Nurse: [phone]      Reason For Call: Patient has new insurance sending new orders to be signed

## 2025-01-22 ENCOUNTER — TELEPHONE (OUTPATIENT)
Dept: INTERNAL MEDICINE CLINIC | Facility: CLINIC | Age: 89
End: 2025-01-22

## 2025-01-22 ENCOUNTER — ANTI-COAG VISIT (OUTPATIENT)
Dept: INTERNAL MEDICINE CLINIC | Facility: CLINIC | Age: 89
End: 2025-01-22

## 2025-01-22 DIAGNOSIS — Z79.01 ANTICOAGULANT LONG-TERM USE: ICD-10-CM

## 2025-01-22 DIAGNOSIS — I48.21 PERMANENT ATRIAL FIBRILLATION (HCC): Primary | ICD-10-CM

## 2025-01-22 LAB — INR BLD: 3.2

## 2025-01-22 NOTE — PROGRESS NOTES
Anticoagulation Summary  As of 1/22/2025      INR goal:  2.0-3.0   TTR:  72.2% (2.6 y)   INR used for dosing:  3.20 (1/22/2025)   Warfarin maintenance plan:  7.5 mg (5 mg x 1.5) every Mon, Fri; 5 mg (5 mg x 1) all other days   Weekly warfarin total:  40 mg   Plan last modified:  Drew Kovacs MD (1/15/2025)   Next INR check:  1/29/2025   Target end date:  Indefinite    Indications    Permanent atrial fibrillation (HCC) [I48.21]  Anticoagulant long-term use [Z79.01]                 Anticoagulation Episode Summary       INR check location:  Home Draw    Preferred lab:  --    Send INR reminders to:  --    Comments:  --          Anticoagulation Care Providers       Provider Role Specialty Phone number    Drew Kovacs MD [facility] Internal Medicine [phone]          '

## 2025-01-22 NOTE — TELEPHONE ENCOUNTER
Ms. Park has called to talk with Gonsalo about her dosage. Her INR was 3.2 this morning.   [phone]

## 2025-01-29 ENCOUNTER — ANTI-COAG VISIT (OUTPATIENT)
Dept: INTERNAL MEDICINE CLINIC | Facility: CLINIC | Age: 89
End: 2025-01-29

## 2025-01-29 DIAGNOSIS — I48.21 PERMANENT ATRIAL FIBRILLATION (HCC): Primary | ICD-10-CM

## 2025-01-29 DIAGNOSIS — Z79.01 ANTICOAGULANT LONG-TERM USE: ICD-10-CM

## 2025-01-29 LAB — INR BLD: 2.5

## 2025-01-29 NOTE — PROGRESS NOTES
Anticoagulation Summary  As of 2025      INR goal:  2.0-3.0   TTR:  72.2% (2.7 y)   INR used for dosin.50 (2025)   Warfarin maintenance plan:  7.5 mg (5 mg x 1.5) every Mon; 5 mg (5 mg x 1) all other days   Weekly warfarin total:  37.5 mg   Plan last modified:  Drew Kovacs MD (2025)   Next INR check:  2025   Target end date:  Indefinite    Indications    Permanent atrial fibrillation (HCC) [I48.21]  Anticoagulant long-term use [Z79.01]                 Anticoagulation Episode Summary       INR check location:  Home Draw    Preferred lab:  --    Send INR reminders to:  --    Comments:  --          Anticoagulation Care Providers       Provider Role Specialty Phone number    Drew Kovacs MD [facility] Internal Medicine [phone]

## 2025-02-05 ENCOUNTER — ANTI-COAG VISIT (OUTPATIENT)
Dept: INTERNAL MEDICINE CLINIC | Facility: CLINIC | Age: 89
End: 2025-02-05

## 2025-02-05 DIAGNOSIS — Z79.01 ANTICOAGULANT LONG-TERM USE: ICD-10-CM

## 2025-02-05 DIAGNOSIS — I48.21 PERMANENT ATRIAL FIBRILLATION (HCC): Primary | ICD-10-CM

## 2025-02-05 LAB — INR BLD: 2.3

## 2025-02-05 NOTE — PROGRESS NOTES
Anticoagulation Summary  As of 2025      INR goal:  2.0-3.0   TTR:  72.4% (2.7 y)   INR used for dosin.30 (2025)   Warfarin maintenance plan:  7.5 mg (5 mg x 1.5) every Mon; 5 mg (5 mg x 1) all other days   Weekly warfarin total:  37.5 mg   Plan last modified:  Drew Kovacs MD (2025)   Next INR check:  2025   Target end date:  Indefinite    Indications    Permanent atrial fibrillation (HCC) [I48.21]  Anticoagulant long-term use [Z79.01]                 Anticoagulation Episode Summary       INR check location:  Home Draw    Preferred lab:  --    Send INR reminders to:  --    Comments:  --          Anticoagulation Care Providers       Provider Role Specialty Phone number    Drew Kovacs MD [facility] Internal Medicine [phone]

## 2025-02-12 ENCOUNTER — ANTI-COAG VISIT (OUTPATIENT)
Dept: INTERNAL MEDICINE CLINIC | Facility: CLINIC | Age: 89
End: 2025-02-12

## 2025-02-12 DIAGNOSIS — I48.21 PERMANENT ATRIAL FIBRILLATION (HCC): Primary | ICD-10-CM

## 2025-02-12 DIAGNOSIS — Z79.01 ANTICOAGULANT LONG-TERM USE: ICD-10-CM

## 2025-02-12 LAB — INR BLD: 2.8

## 2025-02-12 NOTE — PROGRESS NOTES
Anticoagulation Summary  As of 2025      INR goal:  2.0-3.0   TTR:  72.6% (2.7 y)   INR used for dosin.80 (2025)   Warfarin maintenance plan:  7.5 mg (5 mg x 1.5) every Mon; 5 mg (5 mg x 1) all other days   Weekly warfarin total:  37.5 mg   Plan last modified:  Drew Kovacs MD (2025)   Next INR check:  2025   Target end date:  Indefinite    Indications    Permanent atrial fibrillation (HCC) [I48.21]  Anticoagulant long-term use [Z79.01]                 Anticoagulation Episode Summary       INR check location:  Home Draw    Preferred lab:  --    Send INR reminders to:  --    Comments:  --          Anticoagulation Care Providers       Provider Role Specialty Phone number    Drew Kovacs MD [facility] Internal Medicine [phone]

## 2025-02-19 LAB — INR BLD: 2.6

## 2025-02-20 ENCOUNTER — ANTI-COAG VISIT (OUTPATIENT)
Dept: INTERNAL MEDICINE CLINIC | Facility: CLINIC | Age: 89
End: 2025-02-20

## 2025-02-20 DIAGNOSIS — I48.21 PERMANENT ATRIAL FIBRILLATION (HCC): Primary | ICD-10-CM

## 2025-02-20 DIAGNOSIS — Z79.01 ANTICOAGULANT LONG-TERM USE: ICD-10-CM

## 2025-02-20 NOTE — PROGRESS NOTES
Anticoagulation Summary  As of 2025      INR goal:  2.0-3.0   TTR:  72.8% (2.7 y)   INR used for dosin.60 (2025)   Warfarin maintenance plan:  7.5 mg (5 mg x 1.5) every Mon; 5 mg (5 mg x 1) all other days   Weekly warfarin total:  37.5 mg   Plan last modified:  Derw Kovacs MD (2025)   Next INR check:  2025   Target end date:  Indefinite    Indications    Permanent atrial fibrillation (HCC) [I48.21]  Anticoagulant long-term use [Z79.01]                 Anticoagulation Episode Summary       INR check location:  Home Draw    Preferred lab:  --    Send INR reminders to:  --    Comments:  --          Anticoagulation Care Providers       Provider Role Specialty Phone number    Drew Kovacs MD [facility] Internal Medicine [phone]

## 2025-02-26 ENCOUNTER — ANTI-COAG VISIT (OUTPATIENT)
Dept: INTERNAL MEDICINE CLINIC | Facility: CLINIC | Age: 89
End: 2025-02-26

## 2025-02-26 DIAGNOSIS — I48.21 PERMANENT ATRIAL FIBRILLATION (HCC): Primary | ICD-10-CM

## 2025-02-26 DIAGNOSIS — Z79.01 ANTICOAGULANT LONG-TERM USE: ICD-10-CM

## 2025-02-26 LAB — INR BLD: 2.5

## 2025-02-26 NOTE — PROGRESS NOTES
Anticoagulation Summary  As of 2025      INR goal:  2.0-3.0   TTR:  73.0% (2.7 y)   INR used for dosin.50 (2025)   Warfarin maintenance plan:  7.5 mg (5 mg x 1.5) every Mon; 5 mg (5 mg x 1) all other days   Weekly warfarin total:  37.5 mg   Plan last modified:  Drew Kovacs MD (2025)   Next INR check:  3/5/2025   Target end date:  Indefinite    Indications    Permanent atrial fibrillation (HCC) [I48.21]  Anticoagulant long-term use [Z79.01]                 Anticoagulation Episode Summary       INR check location:  Home Draw    Preferred lab:  --    Send INR reminders to:  --    Comments:  --          Anticoagulation Care Providers       Provider Role Specialty Phone number    Drew Kovacs MD [facility] Internal Medicine [phone]

## 2025-03-05 LAB — INR BLD: 2.6

## 2025-03-06 ENCOUNTER — TELEPHONE (OUTPATIENT)
Dept: INTERNAL MEDICINE CLINIC | Facility: CLINIC | Age: 89
End: 2025-03-06

## 2025-03-06 ENCOUNTER — ANTI-COAG VISIT (OUTPATIENT)
Dept: INTERNAL MEDICINE CLINIC | Facility: CLINIC | Age: 89
End: 2025-03-06

## 2025-03-06 DIAGNOSIS — I48.21 PERMANENT ATRIAL FIBRILLATION (HCC): Primary | ICD-10-CM

## 2025-03-06 DIAGNOSIS — Z79.01 ANTICOAGULANT LONG-TERM USE: ICD-10-CM

## 2025-03-06 NOTE — PROGRESS NOTES
Anticoagulation Summary  As of 3/6/2025      INR goal:  2.0-3.0   TTR:  73.1% (2.8 y)   INR used for dosin.60 (3/5/2025)   Warfarin maintenance plan:  7.5 mg (5 mg x 1.5) every Mon; 5 mg (5 mg x 1) all other days   Weekly warfarin total:  37.5 mg   Plan last modified:  Drew Kovacs MD (2025)   Next INR check:  3/12/2025   Target end date:  Indefinite    Indications    Permanent atrial fibrillation (HCC) [I48.21]  Anticoagulant long-term use [Z79.01]                 Anticoagulation Episode Summary       INR check location:  Home Draw    Preferred lab:  --    Send INR reminders to:  --    Comments:  --          Anticoagulation Care Providers       Provider Role Specialty Phone number    Drew Kovacs MD [facility] Internal Medicine [phone]

## 2025-03-12 ENCOUNTER — TELEPHONE (OUTPATIENT)
Dept: INTERNAL MEDICINE CLINIC | Facility: CLINIC | Age: 89
End: 2025-03-12

## 2025-03-12 LAB — INR BLD: 2.3

## 2025-03-13 ENCOUNTER — ANTI-COAG VISIT (OUTPATIENT)
Dept: INTERNAL MEDICINE CLINIC | Facility: CLINIC | Age: 89
End: 2025-03-13

## 2025-03-13 DIAGNOSIS — Z79.01 ANTICOAGULANT LONG-TERM USE: ICD-10-CM

## 2025-03-13 DIAGNOSIS — I48.21 PERMANENT ATRIAL FIBRILLATION (HCC): Primary | ICD-10-CM

## 2025-03-13 NOTE — PROGRESS NOTES
Anticoagulation Summary  As of 3/13/2025      INR goal:  2.0-3.0   TTR:  73.3% (2.8 y)   INR used for dosin.30 (3/12/2025)   Warfarin maintenance plan:  7.5 mg (5 mg x 1.5) every Mon; 5 mg (5 mg x 1) all other days   Weekly warfarin total:  37.5 mg   Plan last modified:  Drew Kovacs MD (2025)   Next INR check:  3/19/2025   Target end date:  Indefinite    Indications    Permanent atrial fibrillation (HCC) [I48.21]  Anticoagulant long-term use [Z79.01]                 Anticoagulation Episode Summary       INR check location:  Home Draw    Preferred lab:  --    Send INR reminders to:  --    Comments:  --          Anticoagulation Care Providers       Provider Role Specialty Phone number    Drew Kovacs MD [facility] Internal Medicine [phone]          '

## 2025-03-19 LAB — INR BLD: 3.6

## 2025-03-20 ENCOUNTER — ANTI-COAG VISIT (OUTPATIENT)
Dept: INTERNAL MEDICINE CLINIC | Facility: CLINIC | Age: 89
End: 2025-03-20

## 2025-03-20 DIAGNOSIS — Z79.01 ANTICOAGULANT LONG-TERM USE: ICD-10-CM

## 2025-03-20 DIAGNOSIS — I48.21 PERMANENT ATRIAL FIBRILLATION (HCC): Primary | ICD-10-CM

## 2025-03-20 NOTE — PROGRESS NOTES
Anticoagulation Summary  As of 3/20/2025      INR goal:  2.0-3.0   TTR:  73.2% (2.8 y)   INR used for dosing:  3.60 (3/19/2025)   Warfarin maintenance plan:  7.5 mg (5 mg x 1.5) every Mon; 5 mg (5 mg x 1) all other days   Weekly warfarin total:  37.5 mg   Plan last modified:  Drew Kovacs MD (1/22/2025)   Next INR check:  3/26/2025   Target end date:  Indefinite    Indications    Permanent atrial fibrillation (HCC) [I48.21]  Anticoagulant long-term use [Z79.01]                 Anticoagulation Episode Summary       INR check location:  Home Draw    Preferred lab:  --    Send INR reminders to:  --    Comments:  --          Anticoagulation Care Providers       Provider Role Specialty Phone number    Drew Kovacs MD [facility] Internal Medicine [phone]

## 2025-03-21 ENCOUNTER — OFFICE VISIT (OUTPATIENT)
Dept: INTERNAL MEDICINE CLINIC | Facility: CLINIC | Age: 89
End: 2025-03-21

## 2025-03-21 VITALS
HEIGHT: 68 IN | HEART RATE: 68 BPM | DIASTOLIC BLOOD PRESSURE: 60 MMHG | WEIGHT: 132 LBS | OXYGEN SATURATION: 98 % | TEMPERATURE: 97.2 F | SYSTOLIC BLOOD PRESSURE: 157 MMHG | BODY MASS INDEX: 20 KG/M2

## 2025-03-21 DIAGNOSIS — I10 PRIMARY HYPERTENSION: ICD-10-CM

## 2025-03-21 DIAGNOSIS — I48.21 PERMANENT ATRIAL FIBRILLATION (HCC): ICD-10-CM

## 2025-03-21 DIAGNOSIS — J06.9 URI, ACUTE: Primary | ICD-10-CM

## 2025-03-21 DIAGNOSIS — G62.9 NEUROPATHY: ICD-10-CM

## 2025-03-21 LAB
EXP DATE SOLUTION: NORMAL
EXP DATE SWAB: NORMAL
EXPIRATION DATE: NORMAL
INFLUENZA A ANTIGEN, POC: NEGATIVE
INFLUENZA B ANTIGEN, POC: NEGATIVE
LOT NUMBER POC: NORMAL
LOT NUMBER SOLUTION: NORMAL
LOT NUMBER SWAB: NORMAL
SARS-COV-2 RNA, POC: NEGATIVE
VALID INTERNAL CONTROL, POC: NORMAL

## 2025-03-21 RX ORDER — DOXYCYCLINE HYCLATE 100 MG
100 TABLET ORAL 2 TIMES DAILY
Qty: 14 TABLET | Refills: 0 | Status: SHIPPED | OUTPATIENT
Start: 2025-03-21 | End: 2025-03-28

## 2025-03-21 SDOH — ECONOMIC STABILITY: FOOD INSECURITY: WITHIN THE PAST 12 MONTHS, YOU WORRIED THAT YOUR FOOD WOULD RUN OUT BEFORE YOU GOT MONEY TO BUY MORE.: NEVER TRUE

## 2025-03-21 SDOH — ECONOMIC STABILITY: FOOD INSECURITY: WITHIN THE PAST 12 MONTHS, THE FOOD YOU BOUGHT JUST DIDN'T LAST AND YOU DIDN'T HAVE MONEY TO GET MORE.: NEVER TRUE

## 2025-03-21 ASSESSMENT — ENCOUNTER SYMPTOMS
DIARRHEA: 1
VOMITING: 0
BACK PAIN: 1
COUGH: 1
SHORTNESS OF BREATH: 1
WHEEZING: 0
NAUSEA: 0

## 2025-03-21 ASSESSMENT — PATIENT HEALTH QUESTIONNAIRE - PHQ9
SUM OF ALL RESPONSES TO PHQ QUESTIONS 1-9: 2
2. FEELING DOWN, DEPRESSED OR HOPELESS: NOT AT ALL
SUM OF ALL RESPONSES TO PHQ QUESTIONS 1-9: 2
1. LITTLE INTEREST OR PLEASURE IN DOING THINGS: MORE THAN HALF THE DAYS
SUM OF ALL RESPONSES TO PHQ QUESTIONS 1-9: 2
SUM OF ALL RESPONSES TO PHQ QUESTIONS 1-9: 2

## 2025-03-21 NOTE — PROGRESS NOTES
Year: Never true     Ran Out of Food in the Last Year: Never true   Transportation Needs: No Transportation Needs (3/21/2025)    PRAPARE - Transportation     Lack of Transportation (Medical): No     Lack of Transportation (Non-Medical): No   Physical Activity: Inactive (9/25/2024)    Exercise Vital Sign     Days of Exercise per Week: 0 days     Minutes of Exercise per Session: 0 min   Social Connections: Unknown (3/20/2021)    Received from [employer]    Social Connections     Frequency of Communication with Friends and Family: Not asked     Frequency of Social Gatherings with Friends and Family: Not asked   Intimate Partner Violence: Unknown (3/20/2021)    Received from [employer]    Intimate Partner Violence     Fear of Current or Ex-Partner: Not asked     Emotionally Abused: Not asked     Physically Abused: Not asked     Sexually Abused: Not asked   Housing Stability: Low Risk  (3/21/2025)    Housing Stability Vital Sign     Unable to Pay for Housing in the Last Year: No     Number of Times Moved in the Last Year: 0     Homeless in the Last Year: No     Past Surgical History:   Procedure Laterality Date    APPENDECTOMY      CATARACT REMOVAL Bilateral 2014    CHOLECYSTECTOMY, LAPAROSCOPIC  07/18/2019    HYSTERECTOMY (CERVIX STATUS UNKNOWN)      MITRAL VALVE REPLACEMENT      --REPAIR    OTHER SURGICAL HISTORY  07/22/2019    exp. lap    OTHER SURGICAL HISTORY      bladder tack    PACEMAKER  05/04/2017    [employer] [place] Model #[id number]   Serial # [id number]    [place] UNLISTED PROCEDURE CARDIAC SURGERY  12/05/2007    mitral valve surgery at [facility]; [employer] Model #[id number];  Mitral valve Annuloplasty band device    THYROIDECTOMY       Current Outpatient Medications   Medication Sig Dispense Refill    digoxin (LANOXIN) 125 MCG tablet Take 1 tablet by mouth daily 90 tablet 3    dilTIAZem (CARDIZEM CD) 240 MG extended release capsule Take 1 capsule by mouth daily 90 capsule 3    levothyroxine (SYNTHROID)

## 2025-03-26 ENCOUNTER — ANTI-COAG VISIT (OUTPATIENT)
Dept: INTERNAL MEDICINE CLINIC | Facility: CLINIC | Age: 89
End: 2025-03-26

## 2025-03-26 DIAGNOSIS — Z79.01 ANTICOAGULANT LONG-TERM USE: ICD-10-CM

## 2025-03-26 DIAGNOSIS — I48.21 PERMANENT ATRIAL FIBRILLATION (HCC): Primary | ICD-10-CM

## 2025-03-26 LAB — INR BLD: 2.3

## 2025-03-26 NOTE — PROGRESS NOTES
Anticoagulation Summary  As of 3/26/2025      INR goal:  2.0-3.0   TTR:  73.1% (2.8 y)   INR used for dosin.30 (3/26/2025)   Warfarin maintenance plan:  5 mg (5 mg x 1) every day   Weekly warfarin total:  35 mg   Plan last modified:  Drew Kovacs MD (3/20/2025)   Next INR check:  2025   Target end date:  Indefinite    Indications    Permanent atrial fibrillation (HCC) [I48.21]  Anticoagulant long-term use [Z79.01]                 Anticoagulation Episode Summary       INR check location:  Home Draw    Preferred lab:  --    Send INR reminders to:  --    Comments:  --          Anticoagulation Care Providers       Provider Role Specialty Phone number    Drew Kovacs MD [facility] Internal Medicine [phone]

## 2025-03-28 ENCOUNTER — OFFICE VISIT (OUTPATIENT)
Dept: INTERNAL MEDICINE CLINIC | Facility: CLINIC | Age: 89
End: 2025-03-28

## 2025-03-28 VITALS
TEMPERATURE: 97.2 F | RESPIRATION RATE: 16 BRPM | OXYGEN SATURATION: 100 % | BODY MASS INDEX: 20.61 KG/M2 | DIASTOLIC BLOOD PRESSURE: 55 MMHG | HEIGHT: 68 IN | WEIGHT: 136 LBS | SYSTOLIC BLOOD PRESSURE: 145 MMHG | HEART RATE: 63 BPM

## 2025-03-28 DIAGNOSIS — I48.21 PERMANENT ATRIAL FIBRILLATION (HCC): ICD-10-CM

## 2025-03-28 DIAGNOSIS — R53.83 OTHER FATIGUE: ICD-10-CM

## 2025-03-28 DIAGNOSIS — E55.9 VITAMIN D DEFICIENCY: ICD-10-CM

## 2025-03-28 DIAGNOSIS — R32 URINARY INCONTINENCE, UNSPECIFIED TYPE: ICD-10-CM

## 2025-03-28 DIAGNOSIS — E03.9 ACQUIRED HYPOTHYROIDISM: ICD-10-CM

## 2025-03-28 DIAGNOSIS — I10 PRIMARY HYPERTENSION: ICD-10-CM

## 2025-03-28 DIAGNOSIS — Z00.00 MEDICARE ANNUAL WELLNESS VISIT, SUBSEQUENT: Primary | ICD-10-CM

## 2025-03-28 PROBLEM — Z98.890 S/P MITRAL VALVE REPAIR: Status: RESOLVED | Noted: 2017-03-09 | Resolved: 2025-03-28

## 2025-03-28 LAB
25(OH)D3 SERPL-MCNC: 39 NG/ML (ref 30–100)
ANION GAP SERPL CALC-SCNC: 10 MMOL/L (ref 7–16)
BASOPHILS # BLD: 0.05 K/UL (ref 0–0.2)
BASOPHILS NFR BLD: 0.7 % (ref 0–2)
BUN SERPL-MCNC: 14 MG/DL (ref 8–23)
CALCIUM SERPL-MCNC: 9.7 MG/DL (ref 8.8–10.2)
CHLORIDE SERPL-SCNC: 106 MMOL/L (ref 98–107)
CO2 SERPL-SCNC: 27 MMOL/L (ref 20–29)
CREAT SERPL-MCNC: 0.8 MG/DL (ref 0.6–1.1)
DIFFERENTIAL METHOD BLD: ABNORMAL
EOSINOPHIL # BLD: 0.03 K/UL (ref 0–0.8)
EOSINOPHIL NFR BLD: 0.4 % (ref 0.5–7.8)
ERYTHROCYTE [DISTWIDTH] IN BLOOD BY AUTOMATED COUNT: 12.9 % (ref 11.9–14.6)
GLUCOSE SERPL-MCNC: 95 MG/DL (ref 70–99)
HCT VFR BLD AUTO: 34.2 % (ref 35.8–46.3)
HGB BLD-MCNC: 11.2 G/DL (ref 11.7–15.4)
IMM GRANULOCYTES # BLD AUTO: 0.02 K/UL (ref 0–0.5)
IMM GRANULOCYTES NFR BLD AUTO: 0.3 % (ref 0–5)
LYMPHOCYTES # BLD: 1.49 K/UL (ref 0.5–4.6)
LYMPHOCYTES NFR BLD: 19.4 % (ref 13–44)
MCH RBC QN AUTO: 31.3 PG (ref 26.1–32.9)
MCHC RBC AUTO-ENTMCNC: 32.7 G/DL (ref 31.4–35)
MCV RBC AUTO: 95.5 FL (ref 82–102)
MONOCYTES # BLD: 0.39 K/UL (ref 0.1–1.3)
MONOCYTES NFR BLD: 5.1 % (ref 4–12)
NEUTS SEG # BLD: 5.69 K/UL (ref 1.7–8.2)
NEUTS SEG NFR BLD: 74.1 % (ref 43–78)
NRBC # BLD: 0 K/UL (ref 0–0.2)
PLATELET # BLD AUTO: 308 K/UL (ref 150–450)
PMV BLD AUTO: 12.1 FL (ref 9.4–12.3)
POTASSIUM SERPL-SCNC: 4.3 MMOL/L (ref 3.5–5.1)
RBC # BLD AUTO: 3.58 M/UL (ref 4.05–5.2)
SODIUM SERPL-SCNC: 143 MMOL/L (ref 136–145)
WBC # BLD AUTO: 7.7 K/UL (ref 4.3–11.1)

## 2025-03-28 ASSESSMENT — PATIENT HEALTH QUESTIONNAIRE - PHQ9
2. FEELING DOWN, DEPRESSED OR HOPELESS: NOT AT ALL
SUM OF ALL RESPONSES TO PHQ QUESTIONS 1-9: 0
1. LITTLE INTEREST OR PLEASURE IN DOING THINGS: NOT AT ALL

## 2025-03-28 NOTE — PROGRESS NOTES
3/28/2025 10:57 AM  Location:[facility] PHYSICIAN SERVICES  [facility] INTERNAL MEDICINE  [place]  Patient #:  [id number]  YOB: 1935          YOUR LAST HEMOGLOBIN A1CS:   No results found for: \"HBA1C\", \"PDU4ZABB\"    YOUR LAST LIPID PROFILE:   Lab Results   Component Value Date/Time    CHOL 199 07/06/2023 10:18 AM    HDL 52 07/06/2023 10:18 AM    .2 07/06/2023 10:18 AM    VLDL 22.8 07/06/2023 10:18 AM         Lab Results   Component Value Date/Time    GFRAA >60 07/08/2022 02:20 PM    BUN 15 09/25/2024 11:09 AM     09/25/2024 11:09 AM    K 4.1 09/25/2024 11:09 AM     09/25/2024 11:09 AM    CO2 27 09/25/2024 11:09 AM    DIG 0.9 11/13/2020 02:40 PM           History of Present Illness     Chief Complaint   Patient presents with    Medicare AWV     Subsequent    6 Month Check-up     Pt presents to the office today for a 6 month check-up.      Letter     Need a letter from the doctor so she can have her mailbox moved closer to the house   This patient is improving from her recent respiratory infection.  She was treated with antibiotics.  She denies shortness of breath.  She is having more difficulty with ambulation and is at risk of falls outdoors into her mailbox which is across the road if she does.    Ms. Park is a 89 y.o. 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

## 2025-04-02 ENCOUNTER — RESULTS FOLLOW-UP (OUTPATIENT)
Dept: INTERNAL MEDICINE CLINIC | Facility: CLINIC | Age: 89
End: 2025-04-02

## 2025-04-02 DIAGNOSIS — D64.9 ANEMIA, UNSPECIFIED TYPE: Primary | ICD-10-CM

## 2025-04-03 ENCOUNTER — CLINICAL SUPPORT (OUTPATIENT)
Age: 89
End: 2025-04-03

## 2025-04-03 DIAGNOSIS — I49.5 TACHY-BRADY SYNDROME (HCC): Primary | ICD-10-CM

## 2025-04-09 ENCOUNTER — ANTI-COAG VISIT (OUTPATIENT)
Dept: INTERNAL MEDICINE CLINIC | Facility: CLINIC | Age: 89
End: 2025-04-09

## 2025-04-09 DIAGNOSIS — Z79.01 ANTICOAGULANT LONG-TERM USE: ICD-10-CM

## 2025-04-09 DIAGNOSIS — I48.21 PERMANENT ATRIAL FIBRILLATION (HCC): Primary | ICD-10-CM

## 2025-04-09 LAB — INR BLD: 3.2

## 2025-04-09 NOTE — PROGRESS NOTES
Anticoagulation Summary  As of 4/9/2025      INR goal:  2.0-3.0   TTR:  73.1% (2.8 y)   INR used for dosing:  3.20 (4/9/2025)   Warfarin maintenance plan:  5 mg (5 mg x 1) every day   Weekly warfarin total:  35 mg   Plan last modified:  Drew Kovacs MD (3/20/2025)   Next INR check:  4/16/2025   Target end date:  Indefinite    Indications    Permanent atrial fibrillation (HCC) [I48.21]  Anticoagulant long-term use [Z79.01]                 Anticoagulation Episode Summary       INR check location:  Home Draw    Preferred lab:  --    Send INR reminders to:  --    Comments:  --          Anticoagulation Care Providers       Provider Role Specialty Phone number    Drew Kovacs MD [facility] Internal Medicine [phone]

## 2025-04-11 ENCOUNTER — TELEPHONE (OUTPATIENT)
Dept: INTERNAL MEDICINE CLINIC | Facility: CLINIC | Age: 89
End: 2025-04-11

## 2025-04-11 NOTE — TELEPHONE ENCOUNTER
Pt states her feet are very swollen, She cannot get her shoes on. Wanted to know if she should change some of her meds or just keep her feet elevated. Stated she has no way to get to the office for an appointment.     Call back #[phone]

## 2025-04-12 ENCOUNTER — TELEPHONE (OUTPATIENT)
Dept: INTERNAL MEDICINE CLINIC | Facility: CLINIC | Age: 89
End: 2025-04-12

## 2025-04-12 NOTE — TELEPHONE ENCOUNTER
I spoke with the pt about her feet and lower legs swelling, which started yesterday. She denies dyspnea, is compliant with her warfarin, no change in meds or diet. If this worsens she is told to go to the ER, otherwise will work in to the office next week.  Please call Monday am for an appt with NP to evaluate her leg issues cms

## 2025-04-14 NOTE — TELEPHONE ENCOUNTER
This has been fully explained to the patient, who indicates understanding.  Pt scheduled to see Kristie on 4/15/2025 at 220pm.

## 2025-04-15 ENCOUNTER — OFFICE VISIT (OUTPATIENT)
Dept: INTERNAL MEDICINE CLINIC | Facility: CLINIC | Age: 89
End: 2025-04-15
Payer: MEDICARE

## 2025-04-15 VITALS
HEIGHT: 68 IN | TEMPERATURE: 98.8 F | DIASTOLIC BLOOD PRESSURE: 73 MMHG | WEIGHT: 138.4 LBS | HEART RATE: 77 BPM | OXYGEN SATURATION: 95 % | SYSTOLIC BLOOD PRESSURE: 163 MMHG | RESPIRATION RATE: 16 BRPM | BODY MASS INDEX: 20.98 KG/M2

## 2025-04-15 DIAGNOSIS — R60.0 BILATERAL LEG EDEMA: ICD-10-CM

## 2025-04-15 DIAGNOSIS — D64.9 ANEMIA, UNSPECIFIED TYPE: ICD-10-CM

## 2025-04-15 DIAGNOSIS — R53.81 CHRONIC FATIGUE AND MALAISE: ICD-10-CM

## 2025-04-15 DIAGNOSIS — R53.82 CHRONIC FATIGUE AND MALAISE: ICD-10-CM

## 2025-04-15 DIAGNOSIS — R06.09 DYSPNEA ON EXERTION: Primary | ICD-10-CM

## 2025-04-15 DIAGNOSIS — R06.09 DYSPNEA ON EXERTION: ICD-10-CM

## 2025-04-15 LAB
ANION GAP SERPL CALC-SCNC: 11 MMOL/L (ref 7–16)
BASOPHILS # BLD: 0.07 K/UL (ref 0–0.2)
BASOPHILS NFR BLD: 1 % (ref 0–2)
BUN SERPL-MCNC: 14 MG/DL (ref 8–23)
CALCIUM SERPL-MCNC: 9.5 MG/DL (ref 8.8–10.2)
CHLORIDE SERPL-SCNC: 100 MMOL/L (ref 98–107)
CO2 SERPL-SCNC: 27 MMOL/L (ref 20–29)
CREAT SERPL-MCNC: 0.99 MG/DL (ref 0.6–1.1)
D DIMER PPP FEU-MCNC: <0.27 UG/ML(FEU)
DIFFERENTIAL METHOD BLD: ABNORMAL
EOSINOPHIL # BLD: 0.08 K/UL (ref 0–0.8)
EOSINOPHIL NFR BLD: 1.1 % (ref 0.5–7.8)
ERYTHROCYTE [DISTWIDTH] IN BLOOD BY AUTOMATED COUNT: 14.7 % (ref 11.9–14.6)
GLUCOSE SERPL-MCNC: 85 MG/DL (ref 70–99)
HCT VFR BLD AUTO: 30.1 % (ref 35.8–46.3)
HGB BLD-MCNC: 10 G/DL (ref 11.7–15.4)
IMM GRANULOCYTES # BLD AUTO: 0.04 K/UL (ref 0–0.5)
IMM GRANULOCYTES NFR BLD AUTO: 0.6 % (ref 0–5)
LYMPHOCYTES # BLD: 1.77 K/UL (ref 0.5–4.6)
LYMPHOCYTES NFR BLD: 24.7 % (ref 13–44)
MAGNESIUM SERPL-MCNC: 2.1 MG/DL (ref 1.8–2.4)
MCH RBC QN AUTO: 32.4 PG (ref 26.1–32.9)
MCHC RBC AUTO-ENTMCNC: 33.2 G/DL (ref 31.4–35)
MCV RBC AUTO: 97.4 FL (ref 82–102)
MONOCYTES # BLD: 0.61 K/UL (ref 0.1–1.3)
MONOCYTES NFR BLD: 8.5 % (ref 4–12)
NEUTS SEG # BLD: 4.59 K/UL (ref 1.7–8.2)
NEUTS SEG NFR BLD: 64.1 % (ref 43–78)
NRBC # BLD: 0 K/UL (ref 0–0.2)
NT PRO BNP: 1442 PG/ML (ref 0–450)
PLATELET # BLD AUTO: 220 K/UL (ref 150–450)
PMV BLD AUTO: 13.4 FL (ref 9.4–12.3)
POTASSIUM SERPL-SCNC: 4.7 MMOL/L (ref 3.5–5.1)
RBC # BLD AUTO: 3.09 M/UL (ref 4.05–5.2)
SODIUM SERPL-SCNC: 137 MMOL/L (ref 136–145)
WBC # BLD AUTO: 7.2 K/UL (ref 4.3–11.1)

## 2025-04-15 PROCEDURE — G8427 DOCREV CUR MEDS BY ELIG CLIN: HCPCS | Performed by: NURSE PRACTITIONER

## 2025-04-15 PROCEDURE — 1090F PRES/ABSN URINE INCON ASSESS: CPT | Performed by: NURSE PRACTITIONER

## 2025-04-15 PROCEDURE — 99214 OFFICE O/P EST MOD 30 MIN: CPT | Performed by: NURSE PRACTITIONER

## 2025-04-15 PROCEDURE — 1123F ACP DISCUSS/DSCN MKR DOCD: CPT | Performed by: NURSE PRACTITIONER

## 2025-04-15 PROCEDURE — 1160F RVW MEDS BY RX/DR IN RCRD: CPT | Performed by: NURSE PRACTITIONER

## 2025-04-15 PROCEDURE — G8420 CALC BMI NORM PARAMETERS: HCPCS | Performed by: NURSE PRACTITIONER

## 2025-04-15 PROCEDURE — 1036F TOBACCO NON-USER: CPT | Performed by: NURSE PRACTITIONER

## 2025-04-15 PROCEDURE — 1159F MED LIST DOCD IN RCRD: CPT | Performed by: NURSE PRACTITIONER

## 2025-04-15 RX ORDER — FUROSEMIDE 20 MG/1
20 TABLET ORAL DAILY
Qty: 3 TABLET | Refills: 0 | Status: SHIPPED | OUTPATIENT
Start: 2025-04-15 | End: 2025-04-18

## 2025-04-15 RX ORDER — ASPIRIN 325 MG
325 TABLET ORAL DAILY
COMMUNITY
End: 2025-04-15 | Stop reason: CLARIF

## 2025-04-15 ASSESSMENT — ENCOUNTER SYMPTOMS
COUGH: 0
ABDOMINAL PAIN: 0
SHORTNESS OF BREATH: 0

## 2025-04-15 NOTE — PROGRESS NOTES
Controlled with current regime
Function Panel; Future    Bilateral leg edema  -     D-Dimer, Quantitative; Future  -     furosemide (LASIX) 20 MG tablet; Take 1 tablet by mouth daily for 3 days  -     Hepatic Function Panel; Future    Chronic fatigue and malaise  -     Magnesium; Future  -     Hepatic Function Panel; Future       Lungs clear upon exam. Sats wnl on Ra, significant peripheral edema. Will check above, even though is on warfarin she does report some abnml inr , last one was 4/9 and 3.2. her hgb was down last ov to 11.2 and needs repeat cbc to follow per pcp. Last echo reviewed. Check bnp. She c/o weakness/sob with exertion, has some weight gain. Will start lasix x3 days only for swelling and follow labs. Also pertinent she is on high dose ccb for her afib. Could contribute to edema.   Advised seek er care for any worsening of sx.     Orders Placed This Encounter   Procedures    D-Dimer, Quantitative     Standing Status:   Future     Number of Occurrences:   1     Expected Date:   4/15/2025     Expiration Date:   4/15/2026    CBC with Auto Differential     Standing Status:   Future     Number of Occurrences:   1     Expected Date:   4/15/2025     Expiration Date:   4/15/2026    Brain Natriuretic Peptide     Standing Status:   Future     Number of Occurrences:   1     Expected Date:   4/15/2025     Expiration Date:   4/15/2026    Basic Metabolic Panel     Standing Status:   Future     Number of Occurrences:   1     Expected Date:   4/15/2025     Expiration Date:   4/15/2026    Magnesium     Standing Status:   Future     Number of Occurrences:   1     Expected Date:   4/15/2025     Expiration Date:   4/15/2026    Hepatic Function Panel     Standing Status:   Future     Expected Date:   4/15/2025     Expiration Date:   4/15/2026           No follow-up provider specified.        Kristie Covarrubias, NEYMAR - NP    
Never smoker

## 2025-04-16 ENCOUNTER — ANTI-COAG VISIT (OUTPATIENT)
Dept: INTERNAL MEDICINE CLINIC | Facility: CLINIC | Age: 89
End: 2025-04-16

## 2025-04-16 DIAGNOSIS — I48.21 PERMANENT ATRIAL FIBRILLATION (HCC): Primary | ICD-10-CM

## 2025-04-16 DIAGNOSIS — Z79.01 ANTICOAGULANT LONG-TERM USE: ICD-10-CM

## 2025-04-16 LAB — INR BLD: 2.9

## 2025-04-16 NOTE — PROGRESS NOTES
Anticoagulation Summary  As of 2025      INR goal:  2.0-3.0   TTR:  72.9% (2.9 y)   INR used for dosin.90 (2025)   Warfarin maintenance plan:  2.5 mg (5 mg x 0.5) every Wed; 5 mg (5 mg x 1) all other days   Weekly warfarin total:  32.5 mg   Plan last modified:  Cele Fernandes APRN - CNP (2025)   Next INR check:  2025   Target end date:  Indefinite    Indications    Permanent atrial fibrillation (HCC) [I48.21]  Anticoagulant long-term use [Z79.01]                 Anticoagulation Episode Summary       INR check location:  Home Draw    Preferred lab:  --    Send INR reminders to:  --    Comments:  --          Anticoagulation Care Providers       Provider Role Specialty Phone number    Drew Kovacs MD [facility] Internal Medicine [phone]

## 2025-04-17 ENCOUNTER — TELEPHONE (OUTPATIENT)
Dept: INTERNAL MEDICINE CLINIC | Facility: CLINIC | Age: 89
End: 2025-04-17

## 2025-04-17 NOTE — TELEPHONE ENCOUNTER
Pt called to get the results from her labs taken on 04/15.      Call Back # [phone] or [phone]   That is fine

## 2025-04-18 ENCOUNTER — RESULTS FOLLOW-UP (OUTPATIENT)
Dept: INTERNAL MEDICINE CLINIC | Facility: CLINIC | Age: 89
End: 2025-04-18

## 2025-04-18 DIAGNOSIS — D50.9 IRON DEFICIENCY ANEMIA, UNSPECIFIED IRON DEFICIENCY ANEMIA TYPE: Primary | ICD-10-CM

## 2025-04-22 ENCOUNTER — RESULTS FOLLOW-UP (OUTPATIENT)
Dept: INTERNAL MEDICINE CLINIC | Facility: CLINIC | Age: 89
End: 2025-04-22

## 2025-04-22 DIAGNOSIS — R79.89 ELEVATED BRAIN NATRIURETIC PEPTIDE (BNP) LEVEL: ICD-10-CM

## 2025-04-22 DIAGNOSIS — R60.0 BILATERAL LEG EDEMA: Primary | ICD-10-CM

## 2025-04-22 RX ORDER — FUROSEMIDE 20 MG/1
20 TABLET ORAL EVERY OTHER DAY
Qty: 15 TABLET | Refills: 0 | Status: SHIPPED | OUTPATIENT
Start: 2025-04-22 | End: 2025-06-16 | Stop reason: SDUPTHER

## 2025-04-22 RX ORDER — POTASSIUM CHLORIDE 750 MG/1
10 TABLET, EXTENDED RELEASE ORAL EVERY OTHER DAY
Qty: 15 TABLET | Refills: 0 | Status: SHIPPED | OUTPATIENT
Start: 2025-04-22 | End: 2025-06-17

## 2025-04-22 NOTE — TELEPHONE ENCOUNTER
Labs did not show evidence of blood clot. She has elevated bnp which could indicate her heart causing fluid build up.   How is the swelling/breathing?  I am sending in additional lasix and she can take one every other day, along with a potassium tablet and she needs to keep follow up with cardiology as scheduled 4/29. Let them know that we added lasix for now.   Add bmp to lab appt on 5/2.  Sent meds to [facility].

## 2025-04-23 ENCOUNTER — ANTI-COAG VISIT (OUTPATIENT)
Dept: INTERNAL MEDICINE CLINIC | Facility: CLINIC | Age: 89
End: 2025-04-23

## 2025-04-23 DIAGNOSIS — I48.21 PERMANENT ATRIAL FIBRILLATION (HCC): Primary | ICD-10-CM

## 2025-04-23 DIAGNOSIS — Z79.01 ANTICOAGULANT LONG-TERM USE: ICD-10-CM

## 2025-04-23 LAB — INR BLD: 3.1

## 2025-04-23 NOTE — PROGRESS NOTES
Anticoagulation Care Providers       Provider Role Specialty Phone number    Drew Kovacs MD [facility] Internal Medicine [phone]

## 2025-04-29 ENCOUNTER — OFFICE VISIT (OUTPATIENT)
Age: 89
End: 2025-04-29
Payer: MEDICARE

## 2025-04-29 VITALS
WEIGHT: 134 LBS | HEIGHT: 68 IN | DIASTOLIC BLOOD PRESSURE: 84 MMHG | BODY MASS INDEX: 20.31 KG/M2 | HEART RATE: 82 BPM | SYSTOLIC BLOOD PRESSURE: 168 MMHG

## 2025-04-29 DIAGNOSIS — I10 ESSENTIAL HYPERTENSION, BENIGN: ICD-10-CM

## 2025-04-29 DIAGNOSIS — I48.21 PERMANENT ATRIAL FIBRILLATION (HCC): Primary | ICD-10-CM

## 2025-04-29 DIAGNOSIS — R06.02 SHORTNESS OF BREATH: ICD-10-CM

## 2025-04-29 DIAGNOSIS — Z98.890 S/P MVR (MITRAL VALVE REPAIR): ICD-10-CM

## 2025-04-29 PROCEDURE — G8420 CALC BMI NORM PARAMETERS: HCPCS | Performed by: INTERNAL MEDICINE

## 2025-04-29 PROCEDURE — 1036F TOBACCO NON-USER: CPT | Performed by: INTERNAL MEDICINE

## 2025-04-29 PROCEDURE — 99214 OFFICE O/P EST MOD 30 MIN: CPT | Performed by: INTERNAL MEDICINE

## 2025-04-29 PROCEDURE — 1090F PRES/ABSN URINE INCON ASSESS: CPT | Performed by: INTERNAL MEDICINE

## 2025-04-29 PROCEDURE — 1123F ACP DISCUSS/DSCN MKR DOCD: CPT | Performed by: INTERNAL MEDICINE

## 2025-04-29 PROCEDURE — G8428 CUR MEDS NOT DOCUMENT: HCPCS | Performed by: INTERNAL MEDICINE

## 2025-04-29 RX ORDER — LOSARTAN POTASSIUM 25 MG/1
25 TABLET ORAL DAILY
Qty: 30 TABLET | Refills: 11 | Status: SHIPPED | OUTPATIENT
Start: 2025-04-29

## 2025-04-29 NOTE — PROGRESS NOTES
[facility] CARDIOLOGY  [address] [place] [address], [address]  [place] [address]  PHONE: [phone]      25    NAME:  Lynette Park  : 1935  MRN: [id number]       SUBJECTIVE:   Lynette Park is a 89 y.o. female seen for a follow up visit regarding the following:     Chief Complaint   Patient presents with    Hyperlipidemia    Fatigue         HPI:    No cp. No orthopnea or pnd. No palpitations or syncope.  Chronic dyspnea and fatigue are worse- Urinates 3-4 times a night- le edema resolved with low dose lasix    Past Medical History, Past Surgical History, Family history, Social History, and Medications were all reviewed with the patient today and updated as necessary.     Current Outpatient Medications   Medication Sig Dispense Refill    losartan (COZAAR) 25 MG tablet Take 1 tablet by mouth daily 30 tablet 11    furosemide (LASIX) 20 MG tablet Take 1 tablet by mouth every other day 15 tablet 0    potassium chloride (KLOR-CON M) 10 MEQ extended release tablet Take 1 tablet by mouth every other day 15 tablet 0    digoxin (LANOXIN) 125 MCG tablet Take 1 tablet by mouth daily (Patient taking differently: Take 1 tablet by mouth daily Does not take on Monday and Thursday) 90 tablet 3    dilTIAZem (CARDIZEM CD) 240 MG extended release capsule Take 1 capsule by mouth daily 90 capsule 3    levothyroxine (SYNTHROID) 100 MCG tablet Take 1 tablet by mouth Daily 90 tablet 3    gabapentin (NEURONTIN) 100 MG capsule Take 1 capsule by mouth nightly for 360 days. 90 capsule 3    warfarin (COUMADIN) 5 MG tablet Take 1 tablet by mouth daily 90 tablet 3    polyethylene glycol (GLYCOLAX) 17 GM/SCOOP powder Take 17 g by mouth daily as needed (constipation)      sodium chloride (MARAL 128) 2 % ophthalmic solution Apply 1 drop to eye 2 times daily (Patient not taking: Reported on 4/15/2025)       No current facility-administered medications for this visit.               Social History     Tobacco Use    Smoking

## 2025-04-30 ENCOUNTER — ANTI-COAG VISIT (OUTPATIENT)
Dept: INTERNAL MEDICINE CLINIC | Facility: CLINIC | Age: 89
End: 2025-04-30

## 2025-04-30 DIAGNOSIS — Z79.01 ANTICOAGULANT LONG-TERM USE: ICD-10-CM

## 2025-04-30 DIAGNOSIS — I48.21 PERMANENT ATRIAL FIBRILLATION (HCC): Primary | ICD-10-CM

## 2025-04-30 LAB — INR BLD: 1.9

## 2025-04-30 NOTE — PROGRESS NOTES
Anticoagulation Summary  As of 2025      INR goal:  2.0-3.0   TTR:  72.8% (2.9 y)   INR used for dosin.90 (2025)   Warfarin maintenance plan:  2.5 mg (5 mg x 0.5) every Wed; 5 mg (5 mg x 1) all other days   Weekly warfarin total:  32.5 mg   Plan last modified:  Cele Fernandes APRN - CNP (2025)   Next INR check:  2025   Target end date:  Indefinite    Indications    Permanent atrial fibrillation (HCC) [I48.21]  Anticoagulant long-term use [Z79.01]                 Anticoagulation Episode Summary       INR check location:  Home Draw    Preferred lab:  --    Send INR reminders to:  --    Comments:  --          Anticoagulation Care Providers       Provider Role Specialty Phone number    Drew Kovacs MD [facility] Internal Medicine [phone]

## 2025-05-01 ENCOUNTER — TELEPHONE (OUTPATIENT)
Dept: INTERNAL MEDICINE CLINIC | Facility: CLINIC | Age: 89
End: 2025-05-01

## 2025-05-01 NOTE — TELEPHONE ENCOUNTER
Pt will need to be seen for that  She can come in to see Rosalina tomorrow, she have some openings and she have 1 opening today for this afternoon

## 2025-05-01 NOTE — TELEPHONE ENCOUNTER
Pts daughter would like to have pt do the alzheimer's test. Pt is coming in for labs tomorrow, 5/2/2025.    Daughters Call back #[phone]

## 2025-05-02 ENCOUNTER — LAB (OUTPATIENT)
Dept: INTERNAL MEDICINE CLINIC | Facility: CLINIC | Age: 89
End: 2025-05-02

## 2025-05-02 DIAGNOSIS — R06.09 DYSPNEA ON EXERTION: ICD-10-CM

## 2025-05-02 DIAGNOSIS — R53.82 CHRONIC FATIGUE AND MALAISE: ICD-10-CM

## 2025-05-02 DIAGNOSIS — R32 URINARY INCONTINENCE, UNSPECIFIED TYPE: Primary | ICD-10-CM

## 2025-05-02 DIAGNOSIS — R60.0 BILATERAL LEG EDEMA: ICD-10-CM

## 2025-05-02 DIAGNOSIS — R32 URINARY INCONTINENCE, UNSPECIFIED TYPE: ICD-10-CM

## 2025-05-02 DIAGNOSIS — D50.9 IRON DEFICIENCY ANEMIA, UNSPECIFIED IRON DEFICIENCY ANEMIA TYPE: ICD-10-CM

## 2025-05-02 DIAGNOSIS — I10 ESSENTIAL HYPERTENSION, BENIGN: ICD-10-CM

## 2025-05-02 DIAGNOSIS — R53.81 CHRONIC FATIGUE AND MALAISE: ICD-10-CM

## 2025-05-02 LAB
ALBUMIN SERPL-MCNC: 4.4 G/DL (ref 3.2–4.6)
ALBUMIN/GLOB SERPL: 1.5 (ref 1–1.9)
ALP SERPL-CCNC: 96 U/L (ref 35–104)
ALT SERPL-CCNC: 21 U/L (ref 8–45)
ANION GAP SERPL CALC-SCNC: 11 MMOL/L (ref 7–16)
AST SERPL-CCNC: 82 U/L (ref 15–37)
BASOPHILS # BLD: 0.05 K/UL (ref 0–0.2)
BASOPHILS NFR BLD: 0.8 % (ref 0–2)
BILIRUB DIRECT SERPL-MCNC: 0.6 MG/DL (ref 0–0.3)
BILIRUB SERPL-MCNC: 1.4 MG/DL (ref 0–1.2)
BUN SERPL-MCNC: 14 MG/DL (ref 8–23)
CALCIUM SERPL-MCNC: 9.9 MG/DL (ref 8.8–10.2)
CHLORIDE SERPL-SCNC: 102 MMOL/L (ref 98–107)
CO2 SERPL-SCNC: 27 MMOL/L (ref 20–29)
CREAT SERPL-MCNC: 1.03 MG/DL (ref 0.6–1.1)
DIFFERENTIAL METHOD BLD: ABNORMAL
EOSINOPHIL # BLD: 0.06 K/UL (ref 0–0.8)
EOSINOPHIL NFR BLD: 0.9 % (ref 0.5–7.8)
ERYTHROCYTE [DISTWIDTH] IN BLOOD BY AUTOMATED COUNT: 15.7 % (ref 11.9–14.6)
FERRITIN SERPL-MCNC: 136 NG/ML (ref 8–388)
GLOBULIN SER CALC-MCNC: 2.8 G/DL (ref 2.3–3.5)
GLUCOSE SERPL-MCNC: 103 MG/DL (ref 70–99)
HCT VFR BLD AUTO: 32.2 % (ref 35.8–46.3)
HGB BLD-MCNC: 10.2 G/DL (ref 11.7–15.4)
IMM GRANULOCYTES # BLD AUTO: 0.03 K/UL (ref 0–0.5)
IMM GRANULOCYTES NFR BLD AUTO: 0.5 % (ref 0–5)
LYMPHOCYTES # BLD: 1.52 K/UL (ref 0.5–4.6)
LYMPHOCYTES NFR BLD: 22.9 % (ref 13–44)
MCH RBC QN AUTO: 32.1 PG (ref 26.1–32.9)
MCHC RBC AUTO-ENTMCNC: 31.7 G/DL (ref 31.4–35)
MCV RBC AUTO: 101.3 FL (ref 82–102)
MONOCYTES # BLD: 0.41 K/UL (ref 0.1–1.3)
MONOCYTES NFR BLD: 6.2 % (ref 4–12)
NEUTS SEG # BLD: 4.57 K/UL (ref 1.7–8.2)
NEUTS SEG NFR BLD: 68.7 % (ref 43–78)
NRBC # BLD: 0 K/UL (ref 0–0.2)
PLATELET # BLD AUTO: 254 K/UL (ref 150–450)
PMV BLD AUTO: 12.7 FL (ref 9.4–12.3)
POTASSIUM SERPL-SCNC: 4.7 MMOL/L (ref 3.5–5.1)
PROT SERPL-MCNC: 7.2 G/DL (ref 6.3–8.2)
RBC # BLD AUTO: 3.18 M/UL (ref 4.05–5.2)
SODIUM SERPL-SCNC: 140 MMOL/L (ref 136–145)
WBC # BLD AUTO: 6.6 K/UL (ref 4.3–11.1)

## 2025-05-04 LAB
BACTERIA SPEC CULT: NORMAL
SERVICE CMNT-IMP: NORMAL

## 2025-05-05 ENCOUNTER — RESULTS FOLLOW-UP (OUTPATIENT)
Dept: INTERNAL MEDICINE CLINIC | Facility: CLINIC | Age: 89
End: 2025-05-05

## 2025-05-05 ENCOUNTER — TELEPHONE (OUTPATIENT)
Dept: INTERNAL MEDICINE CLINIC | Facility: CLINIC | Age: 89
End: 2025-05-05

## 2025-05-05 ENCOUNTER — OFFICE VISIT (OUTPATIENT)
Dept: INTERNAL MEDICINE CLINIC | Facility: CLINIC | Age: 89
End: 2025-05-05
Payer: MEDICARE

## 2025-05-05 VITALS
HEIGHT: 68 IN | DIASTOLIC BLOOD PRESSURE: 60 MMHG | BODY MASS INDEX: 20.46 KG/M2 | HEART RATE: 69 BPM | TEMPERATURE: 98.8 F | OXYGEN SATURATION: 100 % | WEIGHT: 135 LBS | RESPIRATION RATE: 16 BRPM | SYSTOLIC BLOOD PRESSURE: 151 MMHG

## 2025-05-05 DIAGNOSIS — R53.83 OTHER FATIGUE: ICD-10-CM

## 2025-05-05 DIAGNOSIS — I10 PRIMARY HYPERTENSION: ICD-10-CM

## 2025-05-05 DIAGNOSIS — R41.3 MEMORY IMPAIRMENT OF GRADUAL ONSET: Primary | ICD-10-CM

## 2025-05-05 DIAGNOSIS — K21.00 GASTROESOPHAGEAL REFLUX DISEASE WITH ESOPHAGITIS WITHOUT HEMORRHAGE: ICD-10-CM

## 2025-05-05 DIAGNOSIS — Z51.81 ENCOUNTER FOR MEDICATION MONITORING: ICD-10-CM

## 2025-05-05 DIAGNOSIS — D50.9 IRON DEFICIENCY ANEMIA, UNSPECIFIED IRON DEFICIENCY ANEMIA TYPE: Primary | ICD-10-CM

## 2025-05-05 DIAGNOSIS — Z79.01 ANTICOAGULANT LONG-TERM USE: ICD-10-CM

## 2025-05-05 DIAGNOSIS — R41.3 MEMORY IMPAIRMENT OF GRADUAL ONSET: ICD-10-CM

## 2025-05-05 LAB
ALBUMIN SERPL-MCNC: 4.1 G/DL (ref 3.2–4.6)
ALBUMIN/GLOB SERPL: 1.5 (ref 1–1.9)
ALP SERPL-CCNC: 96 U/L (ref 35–104)
ALT SERPL-CCNC: 18 U/L (ref 8–45)
ANION GAP SERPL CALC-SCNC: 11 MMOL/L (ref 7–16)
AST SERPL-CCNC: 78 U/L (ref 15–37)
BASOPHILS # BLD: 0.05 K/UL (ref 0–0.2)
BASOPHILS NFR BLD: 0.8 % (ref 0–2)
BILIRUB SERPL-MCNC: 1.4 MG/DL (ref 0–1.2)
BUN SERPL-MCNC: 14 MG/DL (ref 8–23)
CALCIUM SERPL-MCNC: 9.3 MG/DL (ref 8.8–10.2)
CHLORIDE SERPL-SCNC: 105 MMOL/L (ref 98–107)
CO2 SERPL-SCNC: 25 MMOL/L (ref 20–29)
CREAT SERPL-MCNC: 0.85 MG/DL (ref 0.6–1.1)
DIFFERENTIAL METHOD BLD: ABNORMAL
DIGOXIN SERPL-MCNC: 0.8 NG/ML (ref 0.5–0.9)
EOSINOPHIL # BLD: 0.05 K/UL (ref 0–0.8)
EOSINOPHIL NFR BLD: 0.8 % (ref 0.5–7.8)
ERYTHROCYTE [DISTWIDTH] IN BLOOD BY AUTOMATED COUNT: 15.6 % (ref 11.9–14.6)
GLOBULIN SER CALC-MCNC: 2.7 G/DL (ref 2.3–3.5)
GLUCOSE SERPL-MCNC: 96 MG/DL (ref 70–99)
HCT VFR BLD AUTO: 29.9 % (ref 35.8–46.3)
HGB BLD-MCNC: 9.6 G/DL (ref 11.7–15.4)
IMM GRANULOCYTES # BLD AUTO: 0.02 K/UL (ref 0–0.5)
IMM GRANULOCYTES NFR BLD AUTO: 0.3 % (ref 0–5)
LYMPHOCYTES # BLD: 1.13 K/UL (ref 0.5–4.6)
LYMPHOCYTES NFR BLD: 18.1 % (ref 13–44)
MCH RBC QN AUTO: 32.1 PG (ref 26.1–32.9)
MCHC RBC AUTO-ENTMCNC: 32.1 G/DL (ref 31.4–35)
MCV RBC AUTO: 100 FL (ref 82–102)
MONOCYTES # BLD: 0.4 K/UL (ref 0.1–1.3)
MONOCYTES NFR BLD: 6.4 % (ref 4–12)
NEUTS SEG # BLD: 4.58 K/UL (ref 1.7–8.2)
NEUTS SEG NFR BLD: 73.6 % (ref 43–78)
NRBC # BLD: 0 K/UL (ref 0–0.2)
PLATELET # BLD AUTO: 189 K/UL (ref 150–450)
PMV BLD AUTO: 13.2 FL (ref 9.4–12.3)
POTASSIUM SERPL-SCNC: 4.3 MMOL/L (ref 3.5–5.1)
PROT SERPL-MCNC: 6.8 G/DL (ref 6.3–8.2)
RBC # BLD AUTO: 2.99 M/UL (ref 4.05–5.2)
SODIUM SERPL-SCNC: 140 MMOL/L (ref 136–145)
TSH W FREE THYROID IF ABNORMAL: 0.57 UIU/ML (ref 0.27–4.2)
VIT B12 SERPL-MCNC: 167 PG/ML (ref 193–986)
WBC # BLD AUTO: 6.2 K/UL (ref 4.3–11.1)

## 2025-05-05 PROCEDURE — 1123F ACP DISCUSS/DSCN MKR DOCD: CPT | Performed by: NURSE PRACTITIONER

## 2025-05-05 PROCEDURE — 1036F TOBACCO NON-USER: CPT | Performed by: NURSE PRACTITIONER

## 2025-05-05 PROCEDURE — G0328 FECAL BLOOD SCRN IMMUNOASSAY: HCPCS | Performed by: NURSE PRACTITIONER

## 2025-05-05 PROCEDURE — G8427 DOCREV CUR MEDS BY ELIG CLIN: HCPCS | Performed by: NURSE PRACTITIONER

## 2025-05-05 PROCEDURE — 99215 OFFICE O/P EST HI 40 MIN: CPT | Performed by: NURSE PRACTITIONER

## 2025-05-05 PROCEDURE — G2211 COMPLEX E/M VISIT ADD ON: HCPCS | Performed by: NURSE PRACTITIONER

## 2025-05-05 PROCEDURE — 1160F RVW MEDS BY RX/DR IN RCRD: CPT | Performed by: NURSE PRACTITIONER

## 2025-05-05 PROCEDURE — 1159F MED LIST DOCD IN RCRD: CPT | Performed by: NURSE PRACTITIONER

## 2025-05-05 PROCEDURE — G8420 CALC BMI NORM PARAMETERS: HCPCS | Performed by: NURSE PRACTITIONER

## 2025-05-05 PROCEDURE — 1090F PRES/ABSN URINE INCON ASSESS: CPT | Performed by: NURSE PRACTITIONER

## 2025-05-05 RX ORDER — FAMOTIDINE 20 MG/1
20 TABLET, FILM COATED ORAL 2 TIMES DAILY
Qty: 60 TABLET | Refills: 3 | Status: SHIPPED | OUTPATIENT
Start: 2025-05-05

## 2025-05-05 ASSESSMENT — ENCOUNTER SYMPTOMS
BLOOD IN STOOL: 0
VOMITING: 0
CHEST TIGHTNESS: 0
DIARRHEA: 0
NAUSEA: 0
ABDOMINAL PAIN: 1
SHORTNESS OF BREATH: 1
WHEEZING: 0

## 2025-05-05 NOTE — TELEPHONE ENCOUNTER
Needs a fit test to front. Called and talked with patient's daughter, she will come pick this up. Thanks!

## 2025-05-05 NOTE — PROGRESS NOTES
Unknown (3/20/2021)    Received from [place]    Intimate Partner Violence     Fear of Current or Ex-Partner: Not asked     Emotionally Abused: Not asked     Physically Abused: Not asked     Sexually Abused: Not asked   Housing Stability: Low Risk  (3/21/2025)    Housing Stability Vital Sign     Unable to Pay for Housing in the Last Year: No     Number of Times Moved in the Last Year: 0     Homeless in the Last Year: No     Past Surgical History:   Procedure Laterality Date    APPENDECTOMY      CATARACT REMOVAL Bilateral 2014    CHOLECYSTECTOMY, LAPAROSCOPIC  07/18/2019    HYSTERECTOMY (CERVIX STATUS UNKNOWN)      MITRAL VALVE REPLACEMENT      --REPAIR    OTHER SURGICAL HISTORY  07/22/2019    exp. lap    OTHER SURGICAL HISTORY      bladder tack    PACEMAKER  05/04/2017    [employer] Model #[id number]   Serial # [id number]    [place] UNLISTED PROCEDURE CARDIAC SURGERY  12/05/2007    mitral valve surgery at [facility]; [employer] Model #[id number];  Mitral valve Annuloplasty band device    THYROIDECTOMY       Current Outpatient Medications   Medication Sig Dispense Refill    losartan (COZAAR) 25 MG tablet Take 1 tablet by mouth daily 30 tablet 11    furosemide (LASIX) 20 MG tablet Take 1 tablet by mouth every other day 15 tablet 0    potassium chloride (KLOR-CON M) 10 MEQ extended release tablet Take 1 tablet by mouth every other day 15 tablet 0    digoxin (LANOXIN) 125 MCG tablet Take 1 tablet by mouth daily (Patient taking differently: Take 1 tablet by mouth daily Does not take on Monday and Thursday) 90 tablet 3    dilTIAZem (CARDIZEM CD) 240 MG extended release capsule Take 1 capsule by mouth daily 90 capsule 3    levothyroxine (SYNTHROID) 100 MCG tablet Take 1 tablet by mouth Daily 90 tablet 3    gabapentin (NEURONTIN) 100 MG capsule Take 1 capsule by mouth nightly for 360 days. 90 capsule 3    warfarin (COUMADIN) 5 MG tablet Take 1 tablet by mouth daily 90 tablet 3    polyethylene glycol (GLYCOLAX) 17 GM/SCOOP powder

## 2025-05-06 ENCOUNTER — TELEPHONE (OUTPATIENT)
Dept: INTERNAL MEDICINE CLINIC | Facility: CLINIC | Age: 89
End: 2025-05-06

## 2025-05-06 ENCOUNTER — RESULTS FOLLOW-UP (OUTPATIENT)
Dept: INTERNAL MEDICINE CLINIC | Facility: CLINIC | Age: 89
End: 2025-05-06

## 2025-05-06 DIAGNOSIS — E53.8 B12 DEFICIENCY: Primary | ICD-10-CM

## 2025-05-06 NOTE — RESULT ENCOUNTER NOTE
Patient saw Rosalina yesterday.  Rosalina ordered FIT test as well.  Patients daughter picked it up this morning.

## 2025-05-06 NOTE — TELEPHONE ENCOUNTER
Sent this on  my chart but looks like they dont have my chart. Please call and relay and thank you!!  Rosalina    Ms. Park,  The hemoglobin is a bit lower and I would like to get a FIT test to make sure no internal bleeding. The other lab that is abnormal is the B12 which can also affect anemia and cause neurologic effects.   The b12 is low and so I have called in B12 supplementation. I called this into your local pharmacy so you can get started on them.  You will need to get once weekly shots for a month and then once a month after and we should recheck b12 levels in about 6 weeks.  You can give the shots yourself or can schedule nurse injections here.   I called in the medication and would like to recheck b12 levels and anemia in 6 weeks. Make sure that you are also taking a multivitamin with iron and eating a diet rich in b12 and iron rich foods.   Please let us know if you develop  any new or worsening sx.  Let me know if you have any questions. Let's plan for 6 week follow up to recheck symptoms and labs.   Hoping you are well!  Here if you need anything~  Rosalina

## 2025-05-06 NOTE — TELEPHONE ENCOUNTER
Patient informed and verbalized understanding.   Patient requested to relay the information to her daughter, Kate.  Will call daughter to inform her as well.

## 2025-05-06 NOTE — TELEPHONE ENCOUNTER
Patients daughter informed and relayed understanding.  Will discuss with patient about self administering or coming in the office for b12 injections.  6 week follow up appointment has been scheduled.

## 2025-05-07 ENCOUNTER — ANTI-COAG VISIT (OUTPATIENT)
Dept: INTERNAL MEDICINE CLINIC | Facility: CLINIC | Age: 89
End: 2025-05-07

## 2025-05-07 ENCOUNTER — TELEPHONE (OUTPATIENT)
Dept: INTERNAL MEDICINE CLINIC | Facility: CLINIC | Age: 89
End: 2025-05-07

## 2025-05-07 DIAGNOSIS — I48.21 PERMANENT ATRIAL FIBRILLATION (HCC): Primary | ICD-10-CM

## 2025-05-07 DIAGNOSIS — Z79.01 ANTICOAGULANT LONG-TERM USE: ICD-10-CM

## 2025-05-07 DIAGNOSIS — D50.9 IRON DEFICIENCY ANEMIA, UNSPECIFIED IRON DEFICIENCY ANEMIA TYPE: Primary | ICD-10-CM

## 2025-05-07 LAB
HEMOCCULT STL QL: POSITIVE
INR BLD: 1.5
VALID INTERNAL CONTROL: YES

## 2025-05-07 NOTE — PROGRESS NOTES
Anticoagulation Summary  As of 2025      INR goal:  2.0-3.0   TTR:  72.3% (2.9 y)   INR used for dosin.50 (2025)   Warfarin maintenance plan:  2.5 mg (5 mg x 0.5) every Wed; 5 mg (5 mg x 1) all other days   Weekly warfarin total:  32.5 mg   Plan last modified:  Cele Fernandes APRN - CNP (2025)   Next INR check:  2025   Target end date:  Indefinite    Indications    Permanent atrial fibrillation (HCC) [I48.21]  Anticoagulant long-term use [Z79.01]                 Anticoagulation Episode Summary       INR check location:  Home Draw    Preferred lab:  --    Send INR reminders to:  --    Comments:  --          Anticoagulation Care Providers       Provider Role Specialty Phone number    Drew Kovacs MD [facility] Internal Medicine [phone]

## 2025-05-07 NOTE — TELEPHONE ENCOUNTER
Please schedule 1 week CBC.  Thanks!    Dr. Kovacs, just keeping you in the loop.  I saw her for memory impairment with MMSE of 23 out of 30, has a B12 deficiency and is starting B12 as well as Pepcid twice daily for reflux type symptoms and I also told her to take a multivitamin with iron.  Her fit test returned positive, hgb is down to 9.6.  My plan is to recheck things in about a week and if any lower send her to GI, let me know if you would like me to do anything else.  Thanks so much!  Rosalina

## 2025-05-13 ENCOUNTER — OFFICE VISIT (OUTPATIENT)
Age: 89
End: 2025-05-13
Payer: MEDICARE

## 2025-05-13 VITALS
BODY MASS INDEX: 19.82 KG/M2 | WEIGHT: 130.8 LBS | SYSTOLIC BLOOD PRESSURE: 138 MMHG | DIASTOLIC BLOOD PRESSURE: 64 MMHG | HEART RATE: 80 BPM | HEIGHT: 68 IN

## 2025-05-13 DIAGNOSIS — R60.0 EDEMA LEG: ICD-10-CM

## 2025-05-13 DIAGNOSIS — Z98.890 S/P MVR (MITRAL VALVE REPAIR): Primary | ICD-10-CM

## 2025-05-13 DIAGNOSIS — I48.21 PERMANENT ATRIAL FIBRILLATION (HCC): ICD-10-CM

## 2025-05-13 DIAGNOSIS — I10 ESSENTIAL HYPERTENSION, BENIGN: ICD-10-CM

## 2025-05-13 PROCEDURE — 1036F TOBACCO NON-USER: CPT | Performed by: INTERNAL MEDICINE

## 2025-05-13 PROCEDURE — 99214 OFFICE O/P EST MOD 30 MIN: CPT | Performed by: INTERNAL MEDICINE

## 2025-05-13 PROCEDURE — G8420 CALC BMI NORM PARAMETERS: HCPCS | Performed by: INTERNAL MEDICINE

## 2025-05-13 PROCEDURE — 1090F PRES/ABSN URINE INCON ASSESS: CPT | Performed by: INTERNAL MEDICINE

## 2025-05-13 PROCEDURE — 1126F AMNT PAIN NOTED NONE PRSNT: CPT | Performed by: INTERNAL MEDICINE

## 2025-05-13 PROCEDURE — 1159F MED LIST DOCD IN RCRD: CPT | Performed by: INTERNAL MEDICINE

## 2025-05-13 PROCEDURE — 1123F ACP DISCUSS/DSCN MKR DOCD: CPT | Performed by: INTERNAL MEDICINE

## 2025-05-13 PROCEDURE — G8427 DOCREV CUR MEDS BY ELIG CLIN: HCPCS | Performed by: INTERNAL MEDICINE

## 2025-05-13 RX ORDER — DIGOXIN 125 MCG
125 TABLET ORAL DAILY
Qty: 90 TABLET | Refills: 3 | Status: SHIPPED | OUTPATIENT
Start: 2025-05-13

## 2025-05-13 RX ORDER — DILTIAZEM HYDROCHLORIDE 240 MG/1
240 CAPSULE, COATED, EXTENDED RELEASE ORAL DAILY
Qty: 90 CAPSULE | Refills: 3 | Status: SHIPPED | OUTPATIENT
Start: 2025-05-13

## 2025-05-13 NOTE — PROGRESS NOTES
[facility] CARDIOLOGY  [address] [place] [address], [place] [address]  PHONE: [phone]      25    NAME:  Lynette Park  : 1935  MRN: [id number]       SUBJECTIVE:   Lynette Park is a 89 y.o. female seen for a follow up visit regarding the following:     Chief Complaint   Patient presents with    Atrial Fibrillation    Results     nuc         HPI:    No cp or myers. No orthopnea or pnd. No palpitations or syncope.  Edema much improved- fatigue persists as does myers but much improved with diuresis    Past Medical History, Past Surgical History, Family history, Social History, and Medications were all reviewed with the patient today and updated as necessary.     Current Outpatient Medications   Medication Sig Dispense Refill    dilTIAZem (CARDIZEM CD) 240 MG extended release capsule Take 1 capsule by mouth daily 90 capsule 3    digoxin (LANOXIN) 125 MCG tablet Take 1 tablet by mouth daily 90 tablet 3    Cyanocobalamin 1000 MCG/ML KIT Inject 1,000 mcg as directed Once a week at 5 PM for 4 doses After 4 weeks, inject once monthly 4 kit 3    famotidine (PEPCID) 20 MG tablet Take 1 tablet by mouth 2 times daily 60 tablet 3    furosemide (LASIX) 20 MG tablet Take 1 tablet by mouth every other day 15 tablet 0    potassium chloride (KLOR-CON M) 10 MEQ extended release tablet Take 1 tablet by mouth every other day 15 tablet 0    levothyroxine (SYNTHROID) 100 MCG tablet Take 1 tablet by mouth Daily 90 tablet 3    gabapentin (NEURONTIN) 100 MG capsule Take 1 capsule by mouth nightly for 360 days. 90 capsule 3    warfarin (COUMADIN) 5 MG tablet Take 1 tablet by mouth daily 90 tablet 3    polyethylene glycol (GLYCOLAX) 17 GM/SCOOP powder Take 17 g by mouth daily as needed (constipation)      sodium chloride (MARAL 128) 2 % ophthalmic solution Apply 1 drop to eye 2 times daily      losartan (COZAAR) 25 MG tablet Take 1 tablet by mouth daily (Patient not taking: Reported on 2025) 30 tablet 11

## 2025-05-14 ENCOUNTER — RESULTS FOLLOW-UP (OUTPATIENT)
Dept: INTERNAL MEDICINE CLINIC | Facility: CLINIC | Age: 89
End: 2025-05-14

## 2025-05-14 DIAGNOSIS — R60.0 BILATERAL LEG EDEMA: ICD-10-CM

## 2025-05-14 DIAGNOSIS — D50.9 IRON DEFICIENCY ANEMIA, UNSPECIFIED IRON DEFICIENCY ANEMIA TYPE: ICD-10-CM

## 2025-05-14 LAB
ANION GAP SERPL CALC-SCNC: 9 MMOL/L (ref 7–16)
BASOPHILS # BLD: 0.06 K/UL (ref 0–0.2)
BASOPHILS NFR BLD: 0.9 % (ref 0–2)
BUN SERPL-MCNC: 13 MG/DL (ref 8–23)
CALCIUM SERPL-MCNC: 9.7 MG/DL (ref 8.8–10.2)
CHLORIDE SERPL-SCNC: 101 MMOL/L (ref 98–107)
CO2 SERPL-SCNC: 28 MMOL/L (ref 20–29)
CREAT SERPL-MCNC: 1.02 MG/DL (ref 0.6–1.1)
DIFFERENTIAL METHOD BLD: ABNORMAL
EOSINOPHIL # BLD: 0.05 K/UL (ref 0–0.8)
EOSINOPHIL NFR BLD: 0.7 % (ref 0.5–7.8)
ERYTHROCYTE [DISTWIDTH] IN BLOOD BY AUTOMATED COUNT: 14.2 % (ref 11.9–14.6)
GLUCOSE SERPL-MCNC: 90 MG/DL (ref 70–99)
HCT VFR BLD AUTO: 33.5 % (ref 35.8–46.3)
HGB BLD-MCNC: 10.8 G/DL (ref 11.7–15.4)
IMM GRANULOCYTES # BLD AUTO: 0.02 K/UL (ref 0–0.5)
IMM GRANULOCYTES NFR BLD AUTO: 0.3 % (ref 0–5)
LYMPHOCYTES # BLD: 1.58 K/UL (ref 0.5–4.6)
LYMPHOCYTES NFR BLD: 22.4 % (ref 13–44)
MCH RBC QN AUTO: 32.1 PG (ref 26.1–32.9)
MCHC RBC AUTO-ENTMCNC: 32.2 G/DL (ref 31.4–35)
MCV RBC AUTO: 99.7 FL (ref 82–102)
MONOCYTES # BLD: 0.44 K/UL (ref 0.1–1.3)
MONOCYTES NFR BLD: 6.3 % (ref 4–12)
NEUTS SEG # BLD: 4.89 K/UL (ref 1.7–8.2)
NEUTS SEG NFR BLD: 69.4 % (ref 43–78)
NRBC # BLD: 0 K/UL (ref 0–0.2)
PLATELET # BLD AUTO: 208 K/UL (ref 150–450)
PMV BLD AUTO: 14.1 FL (ref 9.4–12.3)
POTASSIUM SERPL-SCNC: 4.8 MMOL/L (ref 3.5–5.1)
RBC # BLD AUTO: 3.36 M/UL (ref 4.05–5.2)
SODIUM SERPL-SCNC: 138 MMOL/L (ref 136–145)
WBC # BLD AUTO: 7 K/UL (ref 4.3–11.1)

## 2025-05-15 ENCOUNTER — ANTI-COAG VISIT (OUTPATIENT)
Dept: INTERNAL MEDICINE CLINIC | Facility: CLINIC | Age: 89
End: 2025-05-15

## 2025-05-15 DIAGNOSIS — I48.21 PERMANENT ATRIAL FIBRILLATION (HCC): Primary | ICD-10-CM

## 2025-05-15 DIAGNOSIS — Z79.01 ANTICOAGULANT LONG-TERM USE: ICD-10-CM

## 2025-05-15 LAB — INR BLD: 1.9

## 2025-05-16 ENCOUNTER — RESULTS FOLLOW-UP (OUTPATIENT)
Dept: INTERNAL MEDICINE CLINIC | Facility: CLINIC | Age: 89
End: 2025-05-16

## 2025-05-21 LAB — INR BLD: 1.7

## 2025-05-23 ENCOUNTER — TELEPHONE (OUTPATIENT)
Dept: INTERNAL MEDICINE CLINIC | Facility: CLINIC | Age: 89
End: 2025-05-23

## 2025-05-23 ENCOUNTER — ANTI-COAG VISIT (OUTPATIENT)
Dept: INTERNAL MEDICINE CLINIC | Facility: CLINIC | Age: 89
End: 2025-05-23

## 2025-05-23 DIAGNOSIS — Z79.01 ANTICOAGULANT LONG-TERM USE: ICD-10-CM

## 2025-05-23 DIAGNOSIS — I48.21 PERMANENT ATRIAL FIBRILLATION (HCC): Primary | ICD-10-CM

## 2025-05-23 NOTE — PROGRESS NOTES
Anticoagulation Summary  As of 2025      INR goal:  2.0-3.0   TTR:  71.4% (3 y)   INR used for dosin.70 (2025)   Warfarin maintenance plan:  7.5 mg (5 mg x 1.5) every Thu; 5 mg (5 mg x 1) all other days   Weekly warfarin total:  37.5 mg   Plan last modified:  Drew Kovacs MD (5/15/2025)   Next INR check:  2025   Target end date:  Indefinite    Indications    Permanent atrial fibrillation (HCC) [I48.21]  Anticoagulant long-term use [Z79.01]                 Anticoagulation Episode Summary       INR check location:  Home Draw    Preferred lab:  --    Send INR reminders to:  --    Comments:  --          Anticoagulation Care Providers       Provider Role Specialty Phone number    Drew Kovacs MD [facility] Internal Medicine [phone]

## 2025-05-23 NOTE — TELEPHONE ENCOUNTER
Pt has questions about her Warfarin. States when she checked her blood it was a little low (1.7) this past Wednesday.      Please call her back at [phone]

## 2025-05-28 LAB — INR BLD: 2.6

## 2025-05-29 ENCOUNTER — ANTI-COAG VISIT (OUTPATIENT)
Dept: INTERNAL MEDICINE CLINIC | Facility: CLINIC | Age: 89
End: 2025-05-29

## 2025-05-29 DIAGNOSIS — Z79.01 ANTICOAGULANT LONG-TERM USE: ICD-10-CM

## 2025-05-29 DIAGNOSIS — I48.21 PERMANENT ATRIAL FIBRILLATION (HCC): Primary | ICD-10-CM

## 2025-05-29 NOTE — PROGRESS NOTES
Anticoagulation Summary  As of 2025      INR goal:  2.0-3.0   TTR:  71.3% (3 y)   INR used for dosin.60 (2025)   Warfarin maintenance plan:  7.5 mg (5 mg x 1.5) every Mon, Fri; 5 mg (5 mg x 1) all other days   Weekly warfarin total:  40 mg   Plan last modified:  Drew Kovacs MD (2025)   Next INR check:  2025   Target end date:  Indefinite    Indications    Permanent atrial fibrillation (HCC) [I48.21]  Anticoagulant long-term use [Z79.01]                 Anticoagulation Episode Summary       INR check location:  Home Draw    Preferred lab:  --    Send INR reminders to:  --    Comments:  --          Anticoagulation Care Providers       Provider Role Specialty Phone number    Drew Kovacs MD [facility] Internal Medicine [phone]

## 2025-06-04 ENCOUNTER — ANTI-COAG VISIT (OUTPATIENT)
Dept: INTERNAL MEDICINE CLINIC | Facility: CLINIC | Age: 89
End: 2025-06-04
Payer: MEDICARE

## 2025-06-04 DIAGNOSIS — Z79.01 ANTICOAGULANT LONG-TERM USE: ICD-10-CM

## 2025-06-04 DIAGNOSIS — I48.21 PERMANENT ATRIAL FIBRILLATION (HCC): Primary | ICD-10-CM

## 2025-06-04 LAB — INR BLD: 3.7

## 2025-06-04 PROCEDURE — 99212 OFFICE O/P EST SF 10 MIN: CPT | Performed by: INTERNAL MEDICINE

## 2025-06-04 NOTE — PROGRESS NOTES
Anticoagulation Summary  As of 6/4/2025      INR goal:  2.0-3.0   TTR:  71.1% (3 y)   INR used for dosing:  3.70 (6/4/2025)   Warfarin maintenance plan:  7.5 mg (5 mg x 1.5) every Mon, Fri; 5 mg (5 mg x 1) all other days   Weekly warfarin total:  40 mg   Plan last modified:  Drew Kovacs MD (5/23/2025)   Next INR check:  6/11/2025   Target end date:  Indefinite    Indications    Permanent atrial fibrillation (HCC) [I48.21]  Anticoagulant long-term use [Z79.01]                 Anticoagulation Episode Summary       INR check location:  Home Draw    Preferred lab:  --    Send INR reminders to:  --    Comments:  --          Anticoagulation Care Providers       Provider Role Specialty Phone number    Drew Kovacs MD [facility] Internal Medicine [phone]

## 2025-06-04 NOTE — PROGRESS NOTES
On this date 6/4/2025 I have spent 12 minutes reviewing previous notes, test results, and other pertinent medical information with the patient, discussing the diagnosis and importance of compliance with the treatment plan as well as documenting on the day of the visit.    Lynette Park was evaluated through a synchronous (real-time) audio encounter. Patient identification was verified at the start of the visit. She (or guardian if applicable) is aware that this is a billable service, which includes applicable co-pays. This visit was conducted with the patient's (and/or legal guardian's) verbal consent. She has not had a related appointment within my department in the past 7 days or scheduled within the next 24 hours.   The patient was located at Home: [address] [place] [address]  [place] [address].  The provider was located at Facility (Appt Dept): [address] [place] [address],  [place] [address].  Confirm you are appropriately licensed, registered, or certified to deliver care in the state where the patient is located as indicated above. If you are not or unsure, please re-schedule the visit: Yes, I confirm.     Note: not billable if this call serves to triage the patient into an appointment for the relevant concern    Lynette Park is a 89 y.o. female evaluated via telephone on 6/4/2025 for Anticoagulation  .    1. Permanent atrial fibrillation (HCC)  Hold tonight's dose of Coumadin and resume regular schedule. Continue to check weekly. If next week out of range, consider 7.5 mg on Friday only with 5mg all other days.     2. Anticoagulant long-term use  Hold tonight's dose of Coumadin and resume regular schedule. Continue to check weekly. If next week out of range, consider 7.5 mg on Friday only with 5mg all other days.       NEYMAR Franklin NP

## 2025-06-09 ENCOUNTER — RESULTS FOLLOW-UP (OUTPATIENT)
Dept: INTERNAL MEDICINE CLINIC | Facility: CLINIC | Age: 89
End: 2025-06-09

## 2025-06-11 LAB — INR BLD: 2.6

## 2025-06-12 ENCOUNTER — ANTI-COAG VISIT (OUTPATIENT)
Dept: INTERNAL MEDICINE CLINIC | Facility: CLINIC | Age: 89
End: 2025-06-12

## 2025-06-12 DIAGNOSIS — I48.21 PERMANENT ATRIAL FIBRILLATION (HCC): Primary | ICD-10-CM

## 2025-06-12 DIAGNOSIS — Z79.01 ANTICOAGULANT LONG-TERM USE: ICD-10-CM

## 2025-06-12 NOTE — PROGRESS NOTES
Anticoagulation Summary  As of 2025      INR goal:  2.0-3.0   TTR:  70.9% (3 y)   INR used for dosin.60 (2025)   Warfarin maintenance plan:  7.5 mg (5 mg x 1.5) every Mon, Fri; 5 mg (5 mg x 1) all other days   Weekly warfarin total:  40 mg   Plan last modified:  Drew Kovacs MD (2025)   Next INR check:  2025   Target end date:  Indefinite    Indications    Permanent atrial fibrillation (HCC) [I48.21]  Anticoagulant long-term use [Z79.01]                 Anticoagulation Episode Summary       INR check location:  Home Draw    Preferred lab:  --    Send INR reminders to:  --    Comments:  --          Anticoagulation Care Providers       Provider Role Specialty Phone number    Drew Kovacs MD [facility] Internal Medicine [phone]

## 2025-06-16 ENCOUNTER — OFFICE VISIT (OUTPATIENT)
Dept: INTERNAL MEDICINE CLINIC | Facility: CLINIC | Age: 89
End: 2025-06-16
Payer: MEDICARE

## 2025-06-16 VITALS
HEART RATE: 65 BPM | DIASTOLIC BLOOD PRESSURE: 61 MMHG | HEIGHT: 68 IN | OXYGEN SATURATION: 98 % | TEMPERATURE: 97.3 F | SYSTOLIC BLOOD PRESSURE: 138 MMHG | WEIGHT: 133 LBS | RESPIRATION RATE: 16 BRPM | BODY MASS INDEX: 20.16 KG/M2

## 2025-06-16 DIAGNOSIS — E53.8 B12 DEFICIENCY: ICD-10-CM

## 2025-06-16 DIAGNOSIS — R60.1 GENERALIZED EDEMA: ICD-10-CM

## 2025-06-16 DIAGNOSIS — D50.9 IRON DEFICIENCY ANEMIA, UNSPECIFIED IRON DEFICIENCY ANEMIA TYPE: ICD-10-CM

## 2025-06-16 DIAGNOSIS — R79.89 ELEVATED BRAIN NATRIURETIC PEPTIDE (BNP) LEVEL: ICD-10-CM

## 2025-06-16 DIAGNOSIS — R60.0 BILATERAL LEG EDEMA: ICD-10-CM

## 2025-06-16 DIAGNOSIS — M79.89 LEG SWELLING: Primary | ICD-10-CM

## 2025-06-16 DIAGNOSIS — M79.89 LEG SWELLING: ICD-10-CM

## 2025-06-16 LAB
ANION GAP SERPL CALC-SCNC: 11 MMOL/L (ref 7–16)
BASOPHILS # BLD: 0.06 K/UL (ref 0–0.2)
BASOPHILS NFR BLD: 1.1 % (ref 0–2)
BUN SERPL-MCNC: 13 MG/DL (ref 8–23)
CALCIUM SERPL-MCNC: 9.4 MG/DL (ref 8.8–10.2)
CHLORIDE SERPL-SCNC: 104 MMOL/L (ref 98–107)
CO2 SERPL-SCNC: 25 MMOL/L (ref 20–29)
CREAT SERPL-MCNC: 0.91 MG/DL (ref 0.6–1.1)
D DIMER PPP FEU-MCNC: 0.29 UG/ML(FEU)
DIFFERENTIAL METHOD BLD: ABNORMAL
EOSINOPHIL # BLD: 0.06 K/UL (ref 0–0.8)
EOSINOPHIL NFR BLD: 1.1 % (ref 0.5–7.8)
ERYTHROCYTE [DISTWIDTH] IN BLOOD BY AUTOMATED COUNT: 13.3 % (ref 11.9–14.6)
GLUCOSE SERPL-MCNC: 94 MG/DL (ref 70–99)
HCT VFR BLD AUTO: 32.9 % (ref 35.8–46.3)
HGB BLD-MCNC: 10.9 G/DL (ref 11.7–15.4)
IMM GRANULOCYTES # BLD AUTO: 0.02 K/UL (ref 0–0.5)
IMM GRANULOCYTES NFR BLD AUTO: 0.4 % (ref 0–5)
LYMPHOCYTES # BLD: 1.46 K/UL (ref 0.5–4.6)
LYMPHOCYTES NFR BLD: 25.6 % (ref 13–44)
MCH RBC QN AUTO: 31.8 PG (ref 26.1–32.9)
MCHC RBC AUTO-ENTMCNC: 33.1 G/DL (ref 31.4–35)
MCV RBC AUTO: 95.9 FL (ref 82–102)
MONOCYTES # BLD: 0.35 K/UL (ref 0.1–1.3)
MONOCYTES NFR BLD: 6.1 % (ref 4–12)
NEUTS SEG # BLD: 3.76 K/UL (ref 1.7–8.2)
NEUTS SEG NFR BLD: 65.7 % (ref 43–78)
NRBC # BLD: 0 K/UL (ref 0–0.2)
NT PRO BNP: 1117 PG/ML (ref 0–450)
PLATELET # BLD AUTO: 189 K/UL (ref 150–450)
PMV BLD AUTO: 13.5 FL (ref 9.4–12.3)
POTASSIUM SERPL-SCNC: 4.4 MMOL/L (ref 3.5–5.1)
RBC # BLD AUTO: 3.43 M/UL (ref 4.05–5.2)
SODIUM SERPL-SCNC: 140 MMOL/L (ref 136–145)
VIT B12 SERPL-MCNC: 569 PG/ML (ref 193–986)
WBC # BLD AUTO: 5.7 K/UL (ref 4.3–11.1)

## 2025-06-16 PROCEDURE — G2211 COMPLEX E/M VISIT ADD ON: HCPCS | Performed by: NURSE PRACTITIONER

## 2025-06-16 PROCEDURE — 1159F MED LIST DOCD IN RCRD: CPT | Performed by: NURSE PRACTITIONER

## 2025-06-16 PROCEDURE — 1123F ACP DISCUSS/DSCN MKR DOCD: CPT | Performed by: NURSE PRACTITIONER

## 2025-06-16 PROCEDURE — G8420 CALC BMI NORM PARAMETERS: HCPCS | Performed by: NURSE PRACTITIONER

## 2025-06-16 PROCEDURE — 1090F PRES/ABSN URINE INCON ASSESS: CPT | Performed by: NURSE PRACTITIONER

## 2025-06-16 PROCEDURE — 1160F RVW MEDS BY RX/DR IN RCRD: CPT | Performed by: NURSE PRACTITIONER

## 2025-06-16 PROCEDURE — 99214 OFFICE O/P EST MOD 30 MIN: CPT | Performed by: NURSE PRACTITIONER

## 2025-06-16 PROCEDURE — G8427 DOCREV CUR MEDS BY ELIG CLIN: HCPCS | Performed by: NURSE PRACTITIONER

## 2025-06-16 PROCEDURE — 1036F TOBACCO NON-USER: CPT | Performed by: NURSE PRACTITIONER

## 2025-06-16 RX ORDER — FUROSEMIDE 20 MG/1
20 TABLET ORAL EVERY OTHER DAY
Qty: 15 TABLET | Refills: 0 | Status: SHIPPED | OUTPATIENT
Start: 2025-06-16 | End: 2025-06-16 | Stop reason: ALTCHOICE

## 2025-06-16 RX ORDER — FUROSEMIDE 20 MG/1
20 TABLET ORAL DAILY PRN
Qty: 30 TABLET | Refills: 0 | Status: SHIPPED | OUTPATIENT
Start: 2025-06-16

## 2025-06-16 RX ORDER — LATANOPROST 50 UG/ML
SOLUTION/ DROPS OPHTHALMIC
COMMUNITY
Start: 2025-05-19

## 2025-06-16 ASSESSMENT — ENCOUNTER SYMPTOMS
WHEEZING: 0
COLOR CHANGE: 1
SHORTNESS OF BREATH: 0

## 2025-06-16 NOTE — PROGRESS NOTES
6/16/2025 12:15 PM  Location:[facility] PHYSICIAN SERVICES  [facility] INTERNAL MEDICINE  [place]  Patient #:  [id number]  YOB: 1935    Reason for Visit  Swelling Patient presents in the office today c/o bilateral swelling of lower extremities x 1 week   Echo 10/2024:  Last echo in oct 2024-    Left Ventricle: Normal left ventricular systolic function with a visually estimated EF of 65 - 70%. Left ventricle size is normal. Normal wall thickness. Normal wall motion.    Aortic Valve: Mild regurgitation.    Mitral Valve: Valve repaired. Mild to moderate regurgitation with an eccentrically directed jet and may underestimate severity.    Tricuspid Valve: Valve structure is normal. Mild to moderate regurgitation. Moderately elevated RVSP, consistent with moderate pulmonary hypertension. The estimated RVSP is 52 mmHg.    Left Atrium: Left atrium is moderately dilated.    Right Atrium: Right atrium is moderately dilated.  History of Present Illness  The patient is an 89-year-old female who presents with bilateral swelling of her lower extremities for the past week. Her history is significant for hypertension, atrial fibrillation, a pacemaker, and mitral valve repair for which she takes Coumadin. She does follow up with cardiology, and at her last office visit, she reported no orthopnea or PND and reported decreased edema after beginning Lasix.  Her last INR was therapeutic.    She reports experiencing bilateral lower extremity swelling for the past week. The swelling began prior to an  injury to her right foot where she hit her 2nd and 3rd toes against a door.   She also reports redness in her legs, which has improved since last week. She experiences numbness in her feet and toes but reports no weight gain or shortness of breath. She has been advised to elevate her feet, which she has been doing. She notes that her feet swell upon standing. She has been wearing compression stockings, which provide some

## 2025-06-17 ENCOUNTER — RESULTS FOLLOW-UP (OUTPATIENT)
Dept: INTERNAL MEDICINE CLINIC | Facility: CLINIC | Age: 89
End: 2025-06-17

## 2025-06-17 DIAGNOSIS — Z51.81 ENCOUNTER FOR MONITORING DIURETIC THERAPY: Primary | ICD-10-CM

## 2025-06-17 DIAGNOSIS — Z79.899 ENCOUNTER FOR MONITORING DIURETIC THERAPY: ICD-10-CM

## 2025-06-17 DIAGNOSIS — Z51.81 ENCOUNTER FOR MONITORING DIURETIC THERAPY: ICD-10-CM

## 2025-06-17 DIAGNOSIS — Z79.899 ENCOUNTER FOR MONITORING DIURETIC THERAPY: Primary | ICD-10-CM

## 2025-06-17 RX ORDER — POTASSIUM CHLORIDE 750 MG/1
10 TABLET, EXTENDED RELEASE ORAL SEE ADMIN INSTRUCTIONS
Qty: 30 TABLET | Refills: 1 | Status: SHIPPED | OUTPATIENT
Start: 2025-06-17

## 2025-06-17 RX ORDER — POTASSIUM CHLORIDE 750 MG/1
10 TABLET, EXTENDED RELEASE ORAL DAILY
Qty: 30 TABLET | Refills: 1 | Status: SHIPPED | OUTPATIENT
Start: 2025-06-17 | End: 2025-06-17

## 2025-06-23 ENCOUNTER — TELEPHONE (OUTPATIENT)
Dept: INTERNAL MEDICINE CLINIC | Facility: CLINIC | Age: 89
End: 2025-06-23

## 2025-06-23 NOTE — TELEPHONE ENCOUNTER
Ms. Park has called about her INR that was taken on Wednesday last week. I was 3.9. she wants to know how she is to adjust her medication?  She wants a call back please.

## 2025-06-25 ENCOUNTER — TELEMEDICINE (OUTPATIENT)
Dept: INTERNAL MEDICINE CLINIC | Facility: CLINIC | Age: 89
End: 2025-06-25
Payer: MEDICARE

## 2025-06-25 ENCOUNTER — ANTI-COAG VISIT (OUTPATIENT)
Dept: INTERNAL MEDICINE CLINIC | Facility: CLINIC | Age: 89
End: 2025-06-25

## 2025-06-25 DIAGNOSIS — I48.21 PERMANENT ATRIAL FIBRILLATION (HCC): Primary | ICD-10-CM

## 2025-06-25 DIAGNOSIS — Z79.01 ANTICOAGULANT LONG-TERM USE: ICD-10-CM

## 2025-06-25 LAB
INR BLD: 4
INR BLD: 4

## 2025-06-25 PROCEDURE — 93793 ANTICOAG MGMT PT WARFARIN: CPT | Performed by: NURSE PRACTITIONER

## 2025-06-25 ASSESSMENT — ENCOUNTER SYMPTOMS
ANAL BLEEDING: 0
SHORTNESS OF BREATH: 0

## 2025-06-25 NOTE — PROGRESS NOTES
Anticoagulation Summary  As of 2025      INR goal:  2.0-3.0   TTR:  70.4% (3.1 y)   INR used for dosin.00 (2025)   Warfarin maintenance plan:  7.5 mg (5 mg x 1.5) every Mon, Fri; 5 mg (5 mg x 1) all other days   Weekly warfarin total:  40 mg   Plan last modified:  Drew Kovacs MD (2025)   Next INR check:  2025   Target end date:  Indefinite    Indications    Permanent atrial fibrillation (HCC) [I48.21]  Anticoagulant long-term use [Z79.01]                 Anticoagulation Episode Summary       INR check location:  Home Draw    Preferred lab:  --    Send INR reminders to:  --    Comments:  --          Anticoagulation Care Providers       Provider Role Specialty Phone number    Drew Kovacs MD [facility] Internal Medicine [phone]

## 2025-06-25 NOTE — PROGRESS NOTES
[facility] Internal Medicine  [address]  [place] [address]     TELEHEALTH EVALUATION -- Audio/Visual      Lynette Park   1935 06/25/25 6/25/2025    Lynette Park, was evaluated through a synchronous (real-time) audio-video encounter. The patient (or guardian if applicable) is aware that this is a billable service, which includes applicable co-pays. This Virtual Visit was conducted with patient's (and/or legal guardian's) consent. Patient identification was verified, and a caregiver was present when appropriate.   The patient was located at Home: [address]   [place] [address]  Provider was located at Facility (Appt Dept): [address] [place] [address]  Confirm you are appropriately licensed, registered, or certified to deliver care in the state where the patient is located as indicated above. If you are not or unsure, please re-schedule the visit: Yes, I confirm.         History of Present Illness  Ms. Park presents for abnormal INR. Her INR today is 4, with a goal of 2-3 for atrial fibrillation.   Her Coumadin calendar is accurate and she denies recent dietary changes, or any rectal bleeding, melena, or chest pain, SOB.      Results      INR 4.0 today    Review of Systems   Respiratory:  Negative for shortness of breath.    Cardiovascular:  Negative for chest pain.   Gastrointestinal:  Negative for anal bleeding.   All other systems reviewed and are negative.      Prior to Visit Medications    Medication Sig Taking? Authorizing Provider   potassium chloride (KLOR-CON M) 10 MEQ extended release tablet Take 1 tablet by mouth See Admin Instructions Take this only when you take lasix  Cele Fernandes, APRN - CNP   latanoprost (XALATAN) 0.005 % ophthalmic solution INSTILL 1 DROP INTO EACH EYE AT BEDTIME  Provider, MD Debra   furosemide (LASIX) 20 MG tablet Take 1 tablet by mouth daily as needed (take as needed for leg swelling. Do NOT

## 2025-06-25 NOTE — PROGRESS NOTES
Anticoagulation Summary  As of 2025      INR goal:  2.0-3.0   TTR:  70.4% (3.1 y)   INR used for dosin.00 (2025)   Warfarin maintenance plan:  7.5 mg (5 mg x 1.5) every Mon, Fri; 5 mg (5 mg x 1) all other days   Weekly warfarin total:  40 mg   Plan last modified:  Drew Kovacs MD (2025)   Next INR check:  2025   Target end date:  Indefinite    Indications    Permanent atrial fibrillation (HCC) [I48.21]  Anticoagulant long-term use [Z79.01]                 Anticoagulation Episode Summary       INR check location:  Home Draw    Preferred lab:  --    Send INR reminders to:  --    Comments:  --          Anticoagulation Care Providers       Provider Role Specialty Phone number    Drew Kovacs MD [facility] Internal Medicine [phone]

## 2025-07-02 LAB — INR BLD: 2.8

## 2025-07-03 ENCOUNTER — ANTI-COAG VISIT (OUTPATIENT)
Dept: INTERNAL MEDICINE CLINIC | Facility: CLINIC | Age: 89
End: 2025-07-03

## 2025-07-03 DIAGNOSIS — I48.21 PERMANENT ATRIAL FIBRILLATION (HCC): Primary | ICD-10-CM

## 2025-07-03 DIAGNOSIS — Z79.01 ANTICOAGULANT LONG-TERM USE: ICD-10-CM

## 2025-07-03 NOTE — PROGRESS NOTES
Anticoagulation Summary  As of 7/3/2025      INR goal:  2.0-3.0   TTR:  70.0% (3.1 y)   INR used for dosin.80 (2025)   Warfarin maintenance plan:  5 mg (5 mg x 1) every day   Weekly warfarin total:  35 mg   Plan last modified:  Cele Fernandes APRN - CNP (2025)   Next INR check:  2025   Target end date:  Indefinite    Indications    Permanent atrial fibrillation (HCC) [I48.21]  Anticoagulant long-term use [Z79.01]                 Anticoagulation Episode Summary       INR check location:  Home Draw    Preferred lab:  --    Send INR reminders to:  --    Comments:  --          Anticoagulation Care Providers       Provider Role Specialty Phone number    Drew Kovacs MD [facility] [phone]

## 2025-07-09 ENCOUNTER — ANTI-COAG VISIT (OUTPATIENT)
Dept: INTERNAL MEDICINE CLINIC | Facility: CLINIC | Age: 89
End: 2025-07-09
Payer: MEDICARE

## 2025-07-09 DIAGNOSIS — I48.21 PERMANENT ATRIAL FIBRILLATION (HCC): Primary | ICD-10-CM

## 2025-07-09 DIAGNOSIS — Z79.01 ANTICOAGULANT LONG-TERM USE: ICD-10-CM

## 2025-07-09 LAB — INR BLD: 3.1 (ref 2–3)

## 2025-07-09 PROCEDURE — 99212 OFFICE O/P EST SF 10 MIN: CPT | Performed by: NURSE PRACTITIONER

## 2025-07-09 NOTE — PROGRESS NOTES
On this date 7/9/2025 I have spent 6 minutes reviewing previous notes, test results, and other pertinent medical information with the patient, discussing the diagnosis and importance of compliance with the treatment plan as well as documenting on the day of the visit.    Lynette Park was evaluated through a synchronous (real-time) audio encounter. Patient identification was verified at the start of the visit. She (or guardian if applicable) is aware that this is a billable service, which includes applicable co-pays. This visit was conducted with the patient's (and/or legal guardian's) verbal consent. She has not had a related appointment within my department in the past 7 days or scheduled within the next 24 hours.   The patient was located at Other: [facility] [place], [place].  The provider was located at Facility (Appt Dept): [address] [place] [id number].  Confirm you are appropriately licensed, registered, or certified to deliver care in the state where the patient is located as indicated above. If you are not or unsure, please re-schedule the visit: Yes, I confirm.     Note: not billable if this call serves to triage the patient into an appointment for the relevant concern    Lynette Park is a 89 y.o. female evaluated via telephone on 7/9/2025 for No chief complaint on file.  .      1. Permanent atrial fibrillation (HCC)  2. Anticoagulant long-term use  Adjusted tonight's dose to 2.5mg and continue 5mg all other nights.       NEYMAR Franklin NP

## 2025-08-04 RX ORDER — DILTIAZEM HYDROCHLORIDE 240 MG/1
240 CAPSULE, COATED, EXTENDED RELEASE ORAL DAILY
Qty: 90 CAPSULE | Refills: 3 | Status: SHIPPED | OUTPATIENT
Start: 2025-08-04

## 2025-08-06 ENCOUNTER — OFFICE VISIT (OUTPATIENT)
Dept: INTERNAL MEDICINE CLINIC | Facility: CLINIC | Age: 89
End: 2025-08-06
Payer: MEDICARE

## 2025-08-06 VITALS
SYSTOLIC BLOOD PRESSURE: 135 MMHG | BODY MASS INDEX: 19.91 KG/M2 | DIASTOLIC BLOOD PRESSURE: 59 MMHG | HEIGHT: 68 IN | RESPIRATION RATE: 16 BRPM | OXYGEN SATURATION: 100 % | HEART RATE: 65 BPM | TEMPERATURE: 97 F | WEIGHT: 131.4 LBS

## 2025-08-06 DIAGNOSIS — E53.8 B12 DEFICIENCY: ICD-10-CM

## 2025-08-06 DIAGNOSIS — Z79.01 ANTICOAGULANT LONG-TERM USE: ICD-10-CM

## 2025-08-06 DIAGNOSIS — Z79.899 ENCOUNTER FOR MONITORING DIURETIC THERAPY: ICD-10-CM

## 2025-08-06 DIAGNOSIS — M79.89 LEG SWELLING: ICD-10-CM

## 2025-08-06 DIAGNOSIS — I10 PRIMARY HYPERTENSION: ICD-10-CM

## 2025-08-06 DIAGNOSIS — I48.21 PERMANENT ATRIAL FIBRILLATION (HCC): Primary | ICD-10-CM

## 2025-08-06 DIAGNOSIS — R41.3 MEMORY IMPAIRMENT OF GRADUAL ONSET: ICD-10-CM

## 2025-08-06 DIAGNOSIS — D50.9 IRON DEFICIENCY ANEMIA, UNSPECIFIED IRON DEFICIENCY ANEMIA TYPE: ICD-10-CM

## 2025-08-06 DIAGNOSIS — Z51.81 ENCOUNTER FOR MONITORING DIURETIC THERAPY: ICD-10-CM

## 2025-08-06 LAB
ANION GAP SERPL CALC-SCNC: 11 MMOL/L (ref 7–16)
BASOPHILS # BLD: 0.05 K/UL (ref 0–0.2)
BASOPHILS NFR BLD: 1 % (ref 0–2)
BUN SERPL-MCNC: 14 MG/DL (ref 8–23)
CALCIUM SERPL-MCNC: 9.9 MG/DL (ref 8.8–10.2)
CHLORIDE SERPL-SCNC: 104 MMOL/L (ref 98–107)
CO2 SERPL-SCNC: 27 MMOL/L (ref 20–29)
CREAT SERPL-MCNC: 1.04 MG/DL (ref 0.6–1.1)
DIFFERENTIAL METHOD BLD: ABNORMAL
EOSINOPHIL # BLD: 0.05 K/UL (ref 0–0.8)
EOSINOPHIL NFR BLD: 1 % (ref 0.5–7.8)
ERYTHROCYTE [DISTWIDTH] IN BLOOD BY AUTOMATED COUNT: 14.3 % (ref 11.9–14.6)
GLUCOSE SERPL-MCNC: 84 MG/DL (ref 70–99)
HCT VFR BLD AUTO: 32.9 % (ref 35.8–46.3)
HGB BLD-MCNC: 10.6 G/DL (ref 11.7–15.4)
IMM GRANULOCYTES # BLD AUTO: 0.01 K/UL (ref 0–0.5)
IMM GRANULOCYTES NFR BLD AUTO: 0.2 % (ref 0–5)
LYMPHOCYTES # BLD: 1.45 K/UL (ref 0.5–4.6)
LYMPHOCYTES NFR BLD: 29.4 % (ref 13–44)
MCH RBC QN AUTO: 32.6 PG (ref 26.1–32.9)
MCHC RBC AUTO-ENTMCNC: 32.2 G/DL (ref 31.4–35)
MCV RBC AUTO: 101.2 FL (ref 82–102)
MONOCYTES # BLD: 0.4 K/UL (ref 0.1–1.3)
MONOCYTES NFR BLD: 8.1 % (ref 4–12)
NEUTS SEG # BLD: 2.98 K/UL (ref 1.7–8.2)
NEUTS SEG NFR BLD: 60.3 % (ref 43–78)
NRBC # BLD: 0 K/UL (ref 0–0.2)
PLATELET # BLD AUTO: 190 K/UL (ref 150–450)
PMV BLD AUTO: 13.7 FL (ref 9.4–12.3)
POTASSIUM SERPL-SCNC: 4.3 MMOL/L (ref 3.5–5.1)
RBC # BLD AUTO: 3.25 M/UL (ref 4.05–5.2)
SODIUM SERPL-SCNC: 141 MMOL/L (ref 136–145)
VIT B12 SERPL-MCNC: 818 PG/ML (ref 193–986)
WBC # BLD AUTO: 4.9 K/UL (ref 4.3–11.1)

## 2025-08-06 PROCEDURE — 1123F ACP DISCUSS/DSCN MKR DOCD: CPT | Performed by: NURSE PRACTITIONER

## 2025-08-06 PROCEDURE — G8420 CALC BMI NORM PARAMETERS: HCPCS | Performed by: NURSE PRACTITIONER

## 2025-08-06 PROCEDURE — 1036F TOBACCO NON-USER: CPT | Performed by: NURSE PRACTITIONER

## 2025-08-06 PROCEDURE — 1160F RVW MEDS BY RX/DR IN RCRD: CPT | Performed by: NURSE PRACTITIONER

## 2025-08-06 PROCEDURE — G2211 COMPLEX E/M VISIT ADD ON: HCPCS | Performed by: NURSE PRACTITIONER

## 2025-08-06 PROCEDURE — 99214 OFFICE O/P EST MOD 30 MIN: CPT | Performed by: NURSE PRACTITIONER

## 2025-08-06 PROCEDURE — 1159F MED LIST DOCD IN RCRD: CPT | Performed by: NURSE PRACTITIONER

## 2025-08-06 PROCEDURE — 1090F PRES/ABSN URINE INCON ASSESS: CPT | Performed by: NURSE PRACTITIONER

## 2025-08-06 PROCEDURE — G8427 DOCREV CUR MEDS BY ELIG CLIN: HCPCS | Performed by: NURSE PRACTITIONER

## 2025-08-06 ASSESSMENT — ENCOUNTER SYMPTOMS
NAUSEA: 0
VOMITING: 0
SHORTNESS OF BREATH: 0

## (undated) DEVICE — SUTURE PDS II SZ 2-0 L27IN ABSRB VLT L36MM CT-1 1/2 CIR Z339H

## (undated) DEVICE — PACKING GZ W2INXL6FT WVN COT VAG RADPQ

## (undated) DEVICE — SUTURE ABSORBABLE BRAIDED 2-0 CT-1 27 IN UD VICRYL J259H

## (undated) DEVICE — RING RETRCTR FIG 8 32.5X18.3CM -- PLAS STRL W/2 CATH CLIPS

## (undated) DEVICE — GARMENT,MEDLINE,DVT,INT,CALF,MED, GEN2: Brand: MEDLINE

## (undated) DEVICE — 40585 XL ADVANCED TRENDELENBURG POSITIONING KIT: Brand: 40585 XL ADVANCED TRENDELENBURG POSITIONING KIT

## (undated) DEVICE — TRAY PREP DRY W/ PREM GLV 2 APPL 6 SPNG 2 UNDPD 1 OVERWRAP

## (undated) DEVICE — HOOK RETRCT L5MM E SHRP SELF RET SYS LONE STAR

## (undated) DEVICE — BUTTON SWITCH PENCIL BLADE ELECTRODE, HOLSTER: Brand: EDGE

## (undated) DEVICE — SOLUTION IV 50ML 0.9% SOD CHL

## (undated) DEVICE — 2000CC GUARDIAN II: Brand: GUARDIAN

## (undated) DEVICE — Device

## (undated) DEVICE — SOLUTION IV 1000ML 0.9% SOD CHL

## (undated) DEVICE — DERMABOND SKIN ADH 0.7ML -- DERMABOND ADVANCED 12/BX

## (undated) DEVICE — COVER,MAYO STAND,STERILE: Brand: MEDLINE

## (undated) DEVICE — DRAPE,LITHOTOMY,STERILE: Brand: MEDLINE

## (undated) DEVICE — CONTROL SYRINGE LUER-LOCK TIP: Brand: MONOJECT

## (undated) DEVICE — HYPODERMIC SAFETY NEEDLE: Brand: MAGELLAN

## (undated) DEVICE — CYSTO/BLADDER IRRIGATION SET, REGULATING CLAMP

## (undated) DEVICE — CARDINAL HEALTH FLEXIBLE LIGHT HANDLE COVER: Brand: CARDINAL HEALTH

## (undated) DEVICE — TUBING, SUCTION, 1/4" X 10', STRAIGHT: Brand: MEDLINE

## (undated) DEVICE — TOTAL 1-LAYER TRAY, LATEX FOLEY, 16FR 10: Brand: MEDLINE

## (undated) DEVICE — LITHOTOMY: Brand: MEDLINE INDUSTRIES, INC.

## (undated) DEVICE — SOLUTION IRRIGATION H2O 0797305] ICU MEDICAL INC]

## (undated) DEVICE — DRAPE SHT 3 QTR PROXIMA 53X77 --

## (undated) DEVICE — SUTURE VCRL SZ 0 L18IN ABSRB UD L36MM CT-1 1/2 CIR J840D

## (undated) DEVICE — JELLY,LUBE,STERILE,FLIP TOP,TUBE,4-OZ: Brand: MEDLINE

## (undated) DEVICE — NEEDLE HYPO 21GA L1.5IN INTRAMUSCULAR S STL LATCH BVL UP

## (undated) DEVICE — YANKAUER,BULB TIP,W/O VENT,RIGID,STERILE: Brand: MEDLINE

## (undated) DEVICE — REM POLYHESIVE ADULT PATIENT RETURN ELECTRODE: Brand: VALLEYLAB